# Patient Record
Sex: MALE | Race: WHITE | NOT HISPANIC OR LATINO | Employment: FULL TIME | URBAN - METROPOLITAN AREA
[De-identification: names, ages, dates, MRNs, and addresses within clinical notes are randomized per-mention and may not be internally consistent; named-entity substitution may affect disease eponyms.]

---

## 2019-10-16 PROBLEM — E78.5 HYPERLIPIDEMIA: Status: ACTIVE | Noted: 2019-10-16

## 2019-10-16 PROBLEM — I10 HYPERTENSION: Status: ACTIVE | Noted: 2019-10-16

## 2019-10-16 PROBLEM — I25.10 CORONARY ARTERY DISEASE: Status: ACTIVE | Noted: 2019-10-16

## 2019-10-21 RX ORDER — CARVEDILOL 25 MG/1
TABLET ORAL
Qty: 180 TABLET | Refills: 1 | Status: SHIPPED | OUTPATIENT
Start: 2019-10-21 | End: 2020-04-01

## 2020-01-20 RX ORDER — SACUBITRIL AND VALSARTAN 49; 51 MG/1; MG/1
TABLET, FILM COATED ORAL
Qty: 180 TABLET | Refills: 0 | Status: SHIPPED | OUTPATIENT
Start: 2020-01-20 | End: 2020-04-01

## 2020-01-20 RX ORDER — SIMVASTATIN 40 MG
TABLET ORAL
Qty: 90 TABLET | Refills: 0 | Status: SHIPPED | OUTPATIENT
Start: 2020-01-20 | End: 2020-04-01

## 2020-04-01 RX ORDER — SIMVASTATIN 40 MG
TABLET ORAL
Qty: 90 TABLET | Refills: 0 | Status: SHIPPED | OUTPATIENT
Start: 2020-04-01 | End: 2020-05-14

## 2020-04-01 RX ORDER — CARVEDILOL 25 MG/1
TABLET ORAL
Qty: 180 TABLET | Refills: 0 | Status: SHIPPED | OUTPATIENT
Start: 2020-04-01 | End: 2020-05-14

## 2020-04-01 RX ORDER — SACUBITRIL AND VALSARTAN 49; 51 MG/1; MG/1
TABLET, FILM COATED ORAL
Qty: 180 TABLET | Refills: 0 | Status: SHIPPED | OUTPATIENT
Start: 2020-04-01 | End: 2020-05-14

## 2020-05-14 RX ORDER — SACUBITRIL AND VALSARTAN 49; 51 MG/1; MG/1
TABLET, FILM COATED ORAL
Qty: 60 TABLET | Refills: 0 | Status: SHIPPED | OUTPATIENT
Start: 2020-05-14 | End: 2020-10-12

## 2020-05-14 RX ORDER — SIMVASTATIN 40 MG
TABLET ORAL
Qty: 30 TABLET | Refills: 0 | Status: SHIPPED | OUTPATIENT
Start: 2020-05-14 | End: 2020-10-12

## 2020-05-14 RX ORDER — CARVEDILOL 25 MG/1
TABLET ORAL
Qty: 60 TABLET | Refills: 0 | Status: SHIPPED | OUTPATIENT
Start: 2020-05-14 | End: 2020-10-12

## 2020-10-11 DIAGNOSIS — E78.5 HYPERLIPIDEMIA LDL GOAL <100: Primary | ICD-10-CM

## 2020-10-12 RX ORDER — SACUBITRIL AND VALSARTAN 49; 51 MG/1; MG/1
TABLET, FILM COATED ORAL
Qty: 60 TABLET | Refills: 0 | Status: SHIPPED | OUTPATIENT
Start: 2020-10-12 | End: 2020-12-14

## 2020-10-12 RX ORDER — SIMVASTATIN 40 MG
TABLET ORAL
Qty: 30 TABLET | Refills: 0 | Status: SHIPPED | OUTPATIENT
Start: 2020-10-12 | End: 2020-12-14

## 2020-10-12 RX ORDER — CARVEDILOL 25 MG/1
TABLET ORAL
Qty: 60 TABLET | Refills: 0 | Status: SHIPPED | OUTPATIENT
Start: 2020-10-12 | End: 2020-12-14

## 2020-10-27 ENCOUNTER — OFFICE VISIT (OUTPATIENT)
Dept: CARDIOLOGY | Facility: CLINIC | Age: 40
End: 2020-10-27

## 2020-10-27 VITALS
SYSTOLIC BLOOD PRESSURE: 155 MMHG | BODY MASS INDEX: 31.15 KG/M2 | DIASTOLIC BLOOD PRESSURE: 92 MMHG | WEIGHT: 230 LBS | OXYGEN SATURATION: 98 % | HEIGHT: 72 IN | HEART RATE: 75 BPM

## 2020-10-27 DIAGNOSIS — E11.9 TYPE 2 DIABETES MELLITUS WITHOUT COMPLICATION, WITHOUT LONG-TERM CURRENT USE OF INSULIN (HCC): ICD-10-CM

## 2020-10-27 DIAGNOSIS — I10 ESSENTIAL HYPERTENSION: ICD-10-CM

## 2020-10-27 DIAGNOSIS — I25.10 CORONARY ARTERY DISEASE INVOLVING NATIVE CORONARY ARTERY OF NATIVE HEART WITHOUT ANGINA PECTORIS: Primary | ICD-10-CM

## 2020-10-27 DIAGNOSIS — Z95.810 ICD (IMPLANTABLE CARDIOVERTER-DEFIBRILLATOR), DUAL, IN SITU: ICD-10-CM

## 2020-10-27 DIAGNOSIS — E78.00 PURE HYPERCHOLESTEROLEMIA: ICD-10-CM

## 2020-10-27 DIAGNOSIS — I25.5 ISCHEMIC CARDIOMYOPATHY: ICD-10-CM

## 2020-10-27 DIAGNOSIS — I50.22 CHRONIC SYSTOLIC CONGESTIVE HEART FAILURE (HCC): ICD-10-CM

## 2020-10-27 PROCEDURE — 99214 OFFICE O/P EST MOD 30 MIN: CPT | Performed by: INTERNAL MEDICINE

## 2020-10-27 RX ORDER — DAPAGLIFLOZIN 5 MG/1
5 TABLET, FILM COATED ORAL DAILY
COMMUNITY
End: 2021-08-09 | Stop reason: HOSPADM

## 2020-10-27 NOTE — PROGRESS NOTES
"    Subjective:     Encounter Date:10/27/2020      Patient ID: Yogi Tucker is a 40 y.o. male.    Chief Complaint:  History of Present Illness 40-year-old white male with history of coronary status post coronary artery bypass surgery history of congestive heart failure with ischemic cardiomyopathy status post ICD placement diabetes hypertension hyperlipidemia presents to my office for a follow-up.  Patient is currently stable without any symptoms of chest pain or shortness of breath at rest or exertion.  No complains of any PND or orthopnea.  No palpitation dizziness syncope or swelling of the feet.  Patient has been taking all the medicines regularly.  Patient does not smoke.  Patient is trying to exercise regular.  Patient follows a good diet.    The following portions of the patient's history were reviewed and updated as appropriate: allergies, current medications, past family history, past medical history, past social history, past surgical history and problem list.  Past Medical History:   Diagnosis Date   • Coronary artery disease    • Hyperlipidemia    • Hypertension      History reviewed. No pertinent surgical history.  /92 (BP Location: Left arm, Patient Position: Sitting)   Pulse 75   Ht 182.9 cm (72\")   Wt 104 kg (230 lb)   SpO2 98%   BMI 31.19 kg/m²   History reviewed. No pertinent family history.    Current Outpatient Medications:   •  aspirin 325 MG tablet, ASPIRIN 325 MG TABS, Disp: , Rfl:   •  carvedilol (COREG) 25 MG tablet, TAKE 1 TABLET TWICE A DAY  (MUST CALL MD OFFICE TO    MAKE AND KEEP APPOINTMENT  FOR FURTHER REFILLS), Disp: 60 tablet, Rfl: 0  •  dapagliflozin (Farxiga) 5 MG tablet tablet, Take 5 mg by mouth Daily., Disp: , Rfl:   •  Entresto 49-51 MG tablet, TAKE 1 TABLET TWICE A DAY  (MUST CALL MD OFFICE TO    MAKE AND KEEP APPOINTMENT  FOR FURTHER REFILLS), Disp: 60 tablet, Rfl: 0  •  Flaxseed, Linseed, (FLAXSEED OIL) 1000 MG capsule, FLAXSEED OIL 1000 MG CAPS, Disp: , Rfl: "   •  glimepiride (AMARYL) 2 MG tablet, GLIMEPIRIDE 2 MG TABS, Disp: , Rfl:   •  glucose blood (ONE TOUCH ULTRA TEST) test strip, ONETOUCH ULTRA BLUE STRP, Disp: , Rfl:   •  metFORMIN (GLUCOPHAGE) 1000 MG tablet, Take 1,000 mg by mouth 2 (Two) Times a Day., Disp: , Rfl:   •  simvastatin (ZOCOR) 40 MG tablet, TAKE 1 TABLET ONCE DAILY   (MUST CALL MD OFFICE TO    MAKE AND KEEP APPOINTMENT  FOR FURTHER REFILLS), Disp: 30 tablet, Rfl: 0  Allergies   Allergen Reactions   • Shellfish Allergy Unknown (See Comments)     Social History     Socioeconomic History   • Marital status:      Spouse name: Not on file   • Number of children: Not on file   • Years of education: Not on file   • Highest education level: Not on file   Tobacco Use   • Smoking status: Never Smoker   • Smokeless tobacco: Former User     Types: Chew     Review of Systems   Constitution: Negative for fever and malaise/fatigue.   HENT: Negative for ear pain and nosebleeds.    Eyes: Negative for blurred vision and double vision.   Cardiovascular: Negative for chest pain, dyspnea on exertion and palpitations.   Respiratory: Negative for cough and shortness of breath.    Skin: Negative for rash.   Musculoskeletal: Negative for joint pain.   Gastrointestinal: Negative for abdominal pain, nausea and vomiting.   Neurological: Negative for focal weakness and headaches.   Psychiatric/Behavioral: Negative for depression. The patient is not nervous/anxious.    All other systems reviewed and are negative.             Objective:     Constitutional:       Appearance: Well-developed.   Eyes:      General: No scleral icterus.     Conjunctiva/sclera: Conjunctivae normal.      Pupils: Pupils are equal, round, and reactive to light.   HENT:      Head: Normocephalic and atraumatic.   Neck:      Musculoskeletal: Normal range of motion and neck supple.      Vascular: No carotid bruit or JVD.   Pulmonary:      Effort: Pulmonary effort is normal.      Breath sounds: Normal  breath sounds. No wheezing. No rales.   Cardiovascular:      Normal rate. Regular rhythm.   Pulses:     Intact distal pulses.   Abdominal:      General: Bowel sounds are normal.      Palpations: Abdomen is soft.   Musculoskeletal: Normal range of motion.   Skin:     General: Skin is warm and dry.      Findings: No rash.   Neurological:      Mental Status: Alert.      Comments: No focal deficits       Procedures    Lab Review:       Assessment:          Diagnosis Plan   1. Coronary artery disease involving native coronary artery of native heart without angina pectoris     2. Chronic systolic congestive heart failure (CMS/HCC)     3. Ischemic cardiomyopathy     4. Essential hypertension     5. Type 2 diabetes mellitus without complication, without long-term current use of insulin (CMS/HCC)     6. Pure hypercholesterolemia     7. ICD (implantable cardioverter-defibrillator), dual, in situ            Plan:     -History of coronary artery status post coronary artery bypass surgery x3 vessels with a LIMA to LAD and saphenous graft to the marginal branch and RCA  Pain has history of congestive heart with LV systolic dysfunction and also history of ischemic cardiomyopathy status post ICD placement  Patient blood pressure and heart rate stable  Patient's lipid levels are followed by the primary care doctor  Patient has diabetes and is on oral medicines.

## 2020-11-06 ENCOUNTER — CLINICAL SUPPORT NO REQUIREMENTS (OUTPATIENT)
Dept: CARDIOLOGY | Facility: CLINIC | Age: 40
End: 2020-11-06

## 2020-11-06 DIAGNOSIS — Z95.810 PRESENCE OF AUTOMATIC IMPLANTABLE CARDIOVERTER-DEFIBRILLATOR: ICD-10-CM

## 2020-11-06 DIAGNOSIS — I50.9 HEART FAILURE, UNSPECIFIED HF CHRONICITY, UNSPECIFIED HEART FAILURE TYPE (HCC): ICD-10-CM

## 2020-11-06 DIAGNOSIS — I25.5 CARDIOMYOPATHY, ISCHEMIC: Primary | ICD-10-CM

## 2020-11-06 PROCEDURE — 93282 PRGRMG EVAL IMPLANTABLE DFB: CPT | Performed by: INTERNAL MEDICINE

## 2020-12-14 RX ORDER — SACUBITRIL AND VALSARTAN 49; 51 MG/1; MG/1
TABLET, FILM COATED ORAL
Qty: 180 TABLET | Refills: 2 | Status: SHIPPED | OUTPATIENT
Start: 2020-12-14 | End: 2021-08-09 | Stop reason: HOSPADM

## 2020-12-14 RX ORDER — CARVEDILOL 25 MG/1
TABLET ORAL
Qty: 180 TABLET | Refills: 2 | Status: SHIPPED | OUTPATIENT
Start: 2020-12-14 | End: 2021-08-09 | Stop reason: HOSPADM

## 2020-12-14 RX ORDER — SIMVASTATIN 40 MG
TABLET ORAL
Qty: 90 TABLET | Refills: 2 | Status: SHIPPED | OUTPATIENT
Start: 2020-12-14 | End: 2021-08-09 | Stop reason: HOSPADM

## 2021-05-07 ENCOUNTER — CLINICAL SUPPORT NO REQUIREMENTS (OUTPATIENT)
Dept: CARDIOLOGY | Facility: CLINIC | Age: 41
End: 2021-05-07

## 2021-05-07 DIAGNOSIS — I25.5 CARDIOMYOPATHY, ISCHEMIC: Primary | ICD-10-CM

## 2021-05-07 DIAGNOSIS — Z95.810 PRESENCE OF AUTOMATIC IMPLANTABLE CARDIOVERTER-DEFIBRILLATOR: ICD-10-CM

## 2021-05-07 PROCEDURE — 93282 PRGRMG EVAL IMPLANTABLE DFB: CPT | Performed by: INTERNAL MEDICINE

## 2021-08-03 ENCOUNTER — HOSPITAL ENCOUNTER (INPATIENT)
Facility: HOSPITAL | Age: 41
LOS: 6 days | Discharge: ANOTHER HOSPITAL | End: 2021-08-09
Attending: EMERGENCY MEDICINE | Admitting: HOSPITALIST

## 2021-08-03 ENCOUNTER — APPOINTMENT (OUTPATIENT)
Dept: GENERAL RADIOLOGY | Facility: HOSPITAL | Age: 41
End: 2021-08-03

## 2021-08-03 DIAGNOSIS — I21.4 NSTEMI (NON-ST ELEVATED MYOCARDIAL INFARCTION) (HCC): ICD-10-CM

## 2021-08-03 DIAGNOSIS — I50.43 ACUTE ON CHRONIC COMBINED SYSTOLIC AND DIASTOLIC CONGESTIVE HEART FAILURE (HCC): ICD-10-CM

## 2021-08-03 DIAGNOSIS — R77.8 ELEVATED TROPONIN: ICD-10-CM

## 2021-08-03 DIAGNOSIS — R57.0 CARDIOGENIC SHOCK (HCC): Primary | ICD-10-CM

## 2021-08-03 PROBLEM — N17.9 AKI (ACUTE KIDNEY INJURY) (HCC): Status: ACTIVE | Noted: 2021-08-03

## 2021-08-03 PROBLEM — IMO0002 HYPOGONADISM: Status: ACTIVE | Noted: 2017-02-06

## 2021-08-03 PROBLEM — E55.9 VITAMIN D DEFICIENCY: Status: ACTIVE | Noted: 2017-02-06

## 2021-08-03 PROBLEM — Z91.199 H/O NONCOMPLIANCE WITH MEDICAL TREATMENT, PRESENTING HAZARDS TO HEALTH: Status: ACTIVE | Noted: 2021-08-03

## 2021-08-03 PROBLEM — R65.20 SEVERE SEPSIS (HCC): Status: ACTIVE | Noted: 2021-08-03

## 2021-08-03 PROBLEM — E11.9 DIABETES MELLITUS (HCC): Status: ACTIVE | Noted: 2017-02-06

## 2021-08-03 PROBLEM — E78.5 HYPERLIPIDEMIA: Status: ACTIVE | Noted: 2017-02-06

## 2021-08-03 PROBLEM — R53.83 FATIGUE: Status: RESOLVED | Noted: 2017-02-06 | Resolved: 2021-08-03

## 2021-08-03 PROBLEM — E11.65 TYPE 2 DIABETES MELLITUS WITH HYPERGLYCEMIA (HCC): Status: ACTIVE | Noted: 2021-08-03

## 2021-08-03 PROBLEM — Z83.3 FAMILY HISTORY OF DIABETES MELLITUS: Status: ACTIVE | Noted: 2021-08-03

## 2021-08-03 PROBLEM — R53.83 FATIGUE: Status: ACTIVE | Noted: 2017-02-06

## 2021-08-03 PROBLEM — A41.9 SEVERE SEPSIS (HCC): Status: ACTIVE | Noted: 2021-08-03

## 2021-08-03 PROBLEM — I10 HYPERTENSIVE DISORDER: Status: ACTIVE | Noted: 2017-02-06

## 2021-08-03 LAB
ALBUMIN SERPL-MCNC: 3.8 G/DL (ref 3.5–5.2)
ALBUMIN/GLOB SERPL: 1.3 G/DL
ALP SERPL-CCNC: 95 U/L (ref 39–117)
ALT SERPL W P-5'-P-CCNC: 44 U/L (ref 1–41)
ANION GAP SERPL CALCULATED.3IONS-SCNC: 11 MMOL/L (ref 5–15)
APTT PPP: 28.8 SECONDS (ref 61–76.5)
AST SERPL-CCNC: 126 U/L (ref 1–40)
B PARAPERT DNA SPEC QL NAA+PROBE: NOT DETECTED
B PERT DNA SPEC QL NAA+PROBE: NOT DETECTED
BACTERIA UR QL AUTO: ABNORMAL /HPF
BASOPHILS # BLD AUTO: 0.1 10*3/MM3 (ref 0–0.2)
BASOPHILS # BLD AUTO: 0.1 10*3/MM3 (ref 0–0.2)
BASOPHILS NFR BLD AUTO: 0.5 % (ref 0–1.5)
BASOPHILS NFR BLD AUTO: 0.6 % (ref 0–1.5)
BILIRUB SERPL-MCNC: 0.6 MG/DL (ref 0–1.2)
BILIRUB UR QL STRIP: NEGATIVE
BUN SERPL-MCNC: 25 MG/DL (ref 6–20)
BUN/CREAT SERPL: 13.2 (ref 7–25)
C PNEUM DNA NPH QL NAA+NON-PROBE: NOT DETECTED
CALCIUM SPEC-SCNC: 8.6 MG/DL (ref 8.6–10.5)
CHLORIDE SERPL-SCNC: 98 MMOL/L (ref 98–107)
CLARITY UR: CLEAR
CO2 SERPL-SCNC: 26 MMOL/L (ref 22–29)
COLOR UR: YELLOW
CREAT SERPL-MCNC: 1.89 MG/DL (ref 0.76–1.27)
CRP SERPL-MCNC: 14.42 MG/DL (ref 0–0.5)
D-LACTATE SERPL-SCNC: 1.9 MMOL/L (ref 0.5–2)
DEPRECATED RDW RBC AUTO: 43.3 FL (ref 37–54)
DEPRECATED RDW RBC AUTO: 44.6 FL (ref 37–54)
EOSINOPHIL # BLD AUTO: 0 10*3/MM3 (ref 0–0.4)
EOSINOPHIL # BLD AUTO: 0 10*3/MM3 (ref 0–0.4)
EOSINOPHIL NFR BLD AUTO: 0.2 % (ref 0.3–6.2)
EOSINOPHIL NFR BLD AUTO: 0.2 % (ref 0.3–6.2)
ERYTHROCYTE [DISTWIDTH] IN BLOOD BY AUTOMATED COUNT: 13.9 % (ref 12.3–15.4)
ERYTHROCYTE [DISTWIDTH] IN BLOOD BY AUTOMATED COUNT: 14.3 % (ref 12.3–15.4)
FLUAV SUBTYP SPEC NAA+PROBE: NOT DETECTED
FLUBV RNA ISLT QL NAA+PROBE: NOT DETECTED
GFR SERPL CREATININE-BSD FRML MDRD: 40 ML/MIN/1.73
GLOBULIN UR ELPH-MCNC: 3 GM/DL
GLUCOSE BLDC GLUCOMTR-MCNC: 159 MG/DL (ref 70–105)
GLUCOSE SERPL-MCNC: 196 MG/DL (ref 65–99)
GLUCOSE UR STRIP-MCNC: ABNORMAL MG/DL
HADV DNA SPEC NAA+PROBE: NOT DETECTED
HCOV 229E RNA SPEC QL NAA+PROBE: NOT DETECTED
HCOV HKU1 RNA SPEC QL NAA+PROBE: NOT DETECTED
HCOV NL63 RNA SPEC QL NAA+PROBE: NOT DETECTED
HCOV OC43 RNA SPEC QL NAA+PROBE: NOT DETECTED
HCT VFR BLD AUTO: 41.7 % (ref 37.5–51)
HCT VFR BLD AUTO: 43.7 % (ref 37.5–51)
HGB BLD-MCNC: 13.8 G/DL (ref 13–17.7)
HGB BLD-MCNC: 14.3 G/DL (ref 13–17.7)
HGB UR QL STRIP.AUTO: NEGATIVE
HMPV RNA NPH QL NAA+NON-PROBE: NOT DETECTED
HOLD SPECIMEN: NORMAL
HPIV1 RNA SPEC QL NAA+PROBE: NOT DETECTED
HPIV2 RNA SPEC QL NAA+PROBE: NOT DETECTED
HPIV3 RNA NPH QL NAA+PROBE: NOT DETECTED
HPIV4 P GENE NPH QL NAA+PROBE: NOT DETECTED
HYALINE CASTS UR QL AUTO: ABNORMAL /LPF
INR PPP: 1.01 (ref 0.93–1.1)
KETONES UR QL STRIP: NEGATIVE
LEUKOCYTE ESTERASE UR QL STRIP.AUTO: NEGATIVE
LYMPHOCYTES # BLD AUTO: 1.5 10*3/MM3 (ref 0.7–3.1)
LYMPHOCYTES # BLD AUTO: 2 10*3/MM3 (ref 0.7–3.1)
LYMPHOCYTES NFR BLD AUTO: 10.5 % (ref 19.6–45.3)
LYMPHOCYTES NFR BLD AUTO: 13.3 % (ref 19.6–45.3)
M PNEUMO IGG SER IA-ACNC: NOT DETECTED
MAGNESIUM SERPL-MCNC: 2 MG/DL (ref 1.6–2.6)
MCH RBC QN AUTO: 29.4 PG (ref 26.6–33)
MCH RBC QN AUTO: 29.5 PG (ref 26.6–33)
MCHC RBC AUTO-ENTMCNC: 32.7 G/DL (ref 31.5–35.7)
MCHC RBC AUTO-ENTMCNC: 33 G/DL (ref 31.5–35.7)
MCV RBC AUTO: 89.2 FL (ref 79–97)
MCV RBC AUTO: 90 FL (ref 79–97)
MONOCYTES # BLD AUTO: 1.6 10*3/MM3 (ref 0.1–0.9)
MONOCYTES # BLD AUTO: 1.6 10*3/MM3 (ref 0.1–0.9)
MONOCYTES NFR BLD AUTO: 10.5 % (ref 5–12)
MONOCYTES NFR BLD AUTO: 11.2 % (ref 5–12)
MRSA DNA SPEC QL NAA+PROBE: NORMAL
NEUTROPHILS NFR BLD AUTO: 11.2 10*3/MM3 (ref 1.7–7)
NEUTROPHILS NFR BLD AUTO: 11.5 10*3/MM3 (ref 1.7–7)
NEUTROPHILS NFR BLD AUTO: 75.5 % (ref 42.7–76)
NEUTROPHILS NFR BLD AUTO: 77.5 % (ref 42.7–76)
NITRITE UR QL STRIP: NEGATIVE
NRBC BLD AUTO-RTO: 0 /100 WBC (ref 0–0.2)
NRBC BLD AUTO-RTO: 0 /100 WBC (ref 0–0.2)
NT-PROBNP SERPL-MCNC: ABNORMAL PG/ML (ref 0–450)
PH UR STRIP.AUTO: <=5 [PH] (ref 5–8)
PHOSPHATE SERPL-MCNC: 3.5 MG/DL (ref 2.5–4.5)
PLATELET # BLD AUTO: 181 10*3/MM3 (ref 140–450)
PLATELET # BLD AUTO: 211 10*3/MM3 (ref 140–450)
PMV BLD AUTO: 9.1 FL (ref 6–12)
PMV BLD AUTO: 9.3 FL (ref 6–12)
POTASSIUM SERPL-SCNC: 4.8 MMOL/L (ref 3.5–5.2)
PROCALCITONIN SERPL-MCNC: 0.36 NG/ML (ref 0–0.25)
PROT SERPL-MCNC: 6.8 G/DL (ref 6–8.5)
PROT UR QL STRIP: ABNORMAL
PROTHROMBIN TIME: 11.2 SECONDS (ref 9.6–11.7)
RBC # BLD AUTO: 4.68 10*6/MM3 (ref 4.14–5.8)
RBC # BLD AUTO: 4.85 10*6/MM3 (ref 4.14–5.8)
RBC # UR: ABNORMAL /HPF
REF LAB TEST METHOD: ABNORMAL
RHINOVIRUS RNA SPEC NAA+PROBE: NOT DETECTED
RSV RNA NPH QL NAA+NON-PROBE: NOT DETECTED
SARS-COV-2 RNA NPH QL NAA+NON-PROBE: NOT DETECTED
SARS-COV-2 RNA PNL SPEC NAA+PROBE: NOT DETECTED
SODIUM SERPL-SCNC: 135 MMOL/L (ref 136–145)
SP GR UR STRIP: 1.03 (ref 1–1.03)
SQUAMOUS #/AREA URNS HPF: ABNORMAL /HPF
TROPONIN T SERPL-MCNC: 3.19 NG/ML (ref 0–0.03)
TROPONIN T SERPL-MCNC: 3.42 NG/ML (ref 0–0.03)
UROBILINOGEN UR QL STRIP: ABNORMAL
WBC # BLD AUTO: 14.5 10*3/MM3 (ref 3.4–10.8)
WBC # BLD AUTO: 15.2 10*3/MM3 (ref 3.4–10.8)
WBC UR QL AUTO: ABNORMAL /HPF

## 2021-08-03 PROCEDURE — 99285 EMERGENCY DEPT VISIT HI MDM: CPT

## 2021-08-03 PROCEDURE — 87635 SARS-COV-2 COVID-19 AMP PRB: CPT | Performed by: EMERGENCY MEDICINE

## 2021-08-03 PROCEDURE — 87641 MR-STAPH DNA AMP PROBE: CPT | Performed by: NURSE PRACTITIONER

## 2021-08-03 PROCEDURE — 82962 GLUCOSE BLOOD TEST: CPT

## 2021-08-03 PROCEDURE — 83036 HEMOGLOBIN GLYCOSYLATED A1C: CPT | Performed by: NURSE PRACTITIONER

## 2021-08-03 PROCEDURE — 81001 URINALYSIS AUTO W/SCOPE: CPT | Performed by: EMERGENCY MEDICINE

## 2021-08-03 PROCEDURE — 84100 ASSAY OF PHOSPHORUS: CPT | Performed by: EMERGENCY MEDICINE

## 2021-08-03 PROCEDURE — 84484 ASSAY OF TROPONIN QUANT: CPT | Performed by: EMERGENCY MEDICINE

## 2021-08-03 PROCEDURE — 85730 THROMBOPLASTIN TIME PARTIAL: CPT | Performed by: EMERGENCY MEDICINE

## 2021-08-03 PROCEDURE — 63710000001 INSULIN LISPRO (HUMAN) PER 5 UNITS: Performed by: NURSE PRACTITIONER

## 2021-08-03 PROCEDURE — 93005 ELECTROCARDIOGRAM TRACING: CPT | Performed by: EMERGENCY MEDICINE

## 2021-08-03 PROCEDURE — 83880 ASSAY OF NATRIURETIC PEPTIDE: CPT | Performed by: NURSE PRACTITIONER

## 2021-08-03 PROCEDURE — 84145 PROCALCITONIN (PCT): CPT | Performed by: EMERGENCY MEDICINE

## 2021-08-03 PROCEDURE — 0202U NFCT DS 22 TRGT SARS-COV-2: CPT | Performed by: EMERGENCY MEDICINE

## 2021-08-03 PROCEDURE — 86140 C-REACTIVE PROTEIN: CPT | Performed by: NURSE PRACTITIONER

## 2021-08-03 PROCEDURE — 83735 ASSAY OF MAGNESIUM: CPT | Performed by: EMERGENCY MEDICINE

## 2021-08-03 PROCEDURE — 84484 ASSAY OF TROPONIN QUANT: CPT | Performed by: NURSE PRACTITIONER

## 2021-08-03 PROCEDURE — 25010000002 PIPERACILLIN SOD-TAZOBACTAM PER 1 G: Performed by: EMERGENCY MEDICINE

## 2021-08-03 PROCEDURE — 93005 ELECTROCARDIOGRAM TRACING: CPT

## 2021-08-03 PROCEDURE — 71045 X-RAY EXAM CHEST 1 VIEW: CPT

## 2021-08-03 PROCEDURE — 83605 ASSAY OF LACTIC ACID: CPT

## 2021-08-03 PROCEDURE — 85025 COMPLETE CBC W/AUTO DIFF WBC: CPT | Performed by: EMERGENCY MEDICINE

## 2021-08-03 PROCEDURE — 85610 PROTHROMBIN TIME: CPT | Performed by: EMERGENCY MEDICINE

## 2021-08-03 PROCEDURE — 87040 BLOOD CULTURE FOR BACTERIA: CPT | Performed by: EMERGENCY MEDICINE

## 2021-08-03 PROCEDURE — 25010000002 HEPARIN (PORCINE) 25000-0.45 UT/250ML-% SOLUTION: Performed by: EMERGENCY MEDICINE

## 2021-08-03 PROCEDURE — 25010000002 ONDANSETRON PER 1 MG

## 2021-08-03 PROCEDURE — 36415 COLL VENOUS BLD VENIPUNCTURE: CPT | Performed by: NURSE PRACTITIONER

## 2021-08-03 PROCEDURE — 80053 COMPREHEN METABOLIC PANEL: CPT | Performed by: EMERGENCY MEDICINE

## 2021-08-03 RX ORDER — SODIUM CHLORIDE 9 MG/ML
100 INJECTION, SOLUTION INTRAVENOUS CONTINUOUS
Status: DISCONTINUED | OUTPATIENT
Start: 2021-08-03 | End: 2021-08-05

## 2021-08-03 RX ORDER — ATORVASTATIN CALCIUM 40 MG/1
40 TABLET, FILM COATED ORAL NIGHTLY
Status: DISCONTINUED | OUTPATIENT
Start: 2021-08-03 | End: 2021-08-09 | Stop reason: HOSPADM

## 2021-08-03 RX ORDER — ONDANSETRON 2 MG/ML
4 INJECTION INTRAMUSCULAR; INTRAVENOUS EVERY 6 HOURS PRN
Status: DISCONTINUED | OUTPATIENT
Start: 2021-08-03 | End: 2021-08-09 | Stop reason: HOSPADM

## 2021-08-03 RX ORDER — NAPROXEN SODIUM 220 MG
220 TABLET ORAL AS NEEDED
COMMUNITY
End: 2021-08-09 | Stop reason: HOSPADM

## 2021-08-03 RX ORDER — NITROGLYCERIN 0.4 MG/1
0.4 TABLET SUBLINGUAL
Status: DISCONTINUED | OUTPATIENT
Start: 2021-08-03 | End: 2021-08-09 | Stop reason: HOSPADM

## 2021-08-03 RX ORDER — ACETAMINOPHEN 325 MG/1
650 TABLET ORAL AS NEEDED
COMMUNITY
End: 2021-08-09 | Stop reason: HOSPADM

## 2021-08-03 RX ORDER — ASPIRIN 325 MG
325 TABLET ORAL ONCE
Status: COMPLETED | OUTPATIENT
Start: 2021-08-03 | End: 2021-08-03

## 2021-08-03 RX ORDER — ACETAMINOPHEN 325 MG/1
650 TABLET ORAL EVERY 4 HOURS PRN
Status: DISCONTINUED | OUTPATIENT
Start: 2021-08-03 | End: 2021-08-09 | Stop reason: HOSPADM

## 2021-08-03 RX ORDER — ONDANSETRON 4 MG/1
4 TABLET, FILM COATED ORAL EVERY 6 HOURS PRN
Status: DISCONTINUED | OUTPATIENT
Start: 2021-08-03 | End: 2021-08-09 | Stop reason: HOSPADM

## 2021-08-03 RX ORDER — ACETAMINOPHEN 650 MG/1
650 SUPPOSITORY RECTAL EVERY 4 HOURS PRN
Status: DISCONTINUED | OUTPATIENT
Start: 2021-08-03 | End: 2021-08-09 | Stop reason: HOSPADM

## 2021-08-03 RX ORDER — ALUMINA, MAGNESIA, AND SIMETHICONE 2400; 2400; 240 MG/30ML; MG/30ML; MG/30ML
15 SUSPENSION ORAL EVERY 6 HOURS PRN
Status: DISCONTINUED | OUTPATIENT
Start: 2021-08-03 | End: 2021-08-09 | Stop reason: HOSPADM

## 2021-08-03 RX ORDER — HEPARIN SODIUM 10000 [USP'U]/100ML
9.26 INJECTION, SOLUTION INTRAVENOUS
Status: DISCONTINUED | OUTPATIENT
Start: 2021-08-03 | End: 2021-08-07

## 2021-08-03 RX ORDER — ONDANSETRON 2 MG/ML
INJECTION INTRAMUSCULAR; INTRAVENOUS
Status: COMPLETED
Start: 2021-08-03 | End: 2021-08-03

## 2021-08-03 RX ORDER — ASPIRIN 81 MG/1
81 TABLET ORAL DAILY
Status: DISCONTINUED | OUTPATIENT
Start: 2021-08-04 | End: 2021-08-09 | Stop reason: HOSPADM

## 2021-08-03 RX ORDER — NICOTINE POLACRILEX 4 MG
15 LOZENGE BUCCAL
Status: DISCONTINUED | OUTPATIENT
Start: 2021-08-03 | End: 2021-08-09 | Stop reason: HOSPADM

## 2021-08-03 RX ORDER — DEXTROSE MONOHYDRATE 25 G/50ML
25 INJECTION, SOLUTION INTRAVENOUS
Status: DISCONTINUED | OUTPATIENT
Start: 2021-08-03 | End: 2021-08-09 | Stop reason: HOSPADM

## 2021-08-03 RX ORDER — INSULIN LISPRO 100 [IU]/ML
0-14 INJECTION, SOLUTION INTRAVENOUS; SUBCUTANEOUS AS NEEDED
Status: DISCONTINUED | OUTPATIENT
Start: 2021-08-03 | End: 2021-08-07

## 2021-08-03 RX ORDER — ONDANSETRON 2 MG/ML
8 INJECTION INTRAMUSCULAR; INTRAVENOUS ONCE
Status: COMPLETED | OUTPATIENT
Start: 2021-08-03 | End: 2021-08-03

## 2021-08-03 RX ORDER — INSULIN LISPRO 100 [IU]/ML
0-14 INJECTION, SOLUTION INTRAVENOUS; SUBCUTANEOUS EVERY 6 HOURS SCHEDULED
Status: DISCONTINUED | OUTPATIENT
Start: 2021-08-03 | End: 2021-08-07

## 2021-08-03 RX ADMIN — ASPIRIN 325 MG ORAL TABLET 325 MG: 325 PILL ORAL at 15:31

## 2021-08-03 RX ADMIN — SODIUM CHLORIDE, POTASSIUM CHLORIDE, SODIUM LACTATE AND CALCIUM CHLORIDE 2000 ML: 600; 310; 30; 20 INJECTION, SOLUTION INTRAVENOUS at 14:46

## 2021-08-03 RX ADMIN — PIPERACILLIN AND TAZOBACTAM 3.38 G: 3; .375 INJECTION, POWDER, LYOPHILIZED, FOR SOLUTION INTRAVENOUS at 15:04

## 2021-08-03 RX ADMIN — SODIUM CHLORIDE 50 ML/HR: 9 INJECTION, SOLUTION INTRAVENOUS at 20:38

## 2021-08-03 RX ADMIN — ONDANSETRON 8 MG: 2 INJECTION INTRAMUSCULAR; INTRAVENOUS at 18:13

## 2021-08-03 RX ADMIN — INSULIN LISPRO 3 UNITS: 100 INJECTION, SOLUTION INTRAVENOUS; SUBCUTANEOUS at 20:01

## 2021-08-03 RX ADMIN — HEPARIN SODIUM 9.26 UNITS/KG/HR: 10000 INJECTION, SOLUTION INTRAVENOUS at 19:16

## 2021-08-03 RX ADMIN — ATORVASTATIN CALCIUM 40 MG: 40 TABLET, FILM COATED ORAL at 20:01

## 2021-08-03 NOTE — ATTESTATION SEPSIS FOCUSED EXAM
SEPTIC SHOCK FOCUSED EXAM ATTESTATION    I attest that I have reassessed tissue perfusion after the fluid bolus given.  Patient does not appear to be in septic shock, but it felt likely to be in cardiogenic shock.  Sepsis cannot be ruled out.      Micha Ferreira, WHIT  08/03/21  19:17 EDT

## 2021-08-03 NOTE — ED PROVIDER NOTES
Subjective   40-year-old male had the onset of chest tightness and shortness of breath starting yesterday. He states she had some muscle aches and some feeling that his legs were little swollen. He states he had an extensive nonproductive cough. He states he laid he got up and was moving around last night he had a syncopal episode because he was so short of breath. He states he finished the Covid vaccination process in April. He reports that he has not had much chest pain and just feels a little tight occasionally with exertion. He reports he has not recently checked his blood sugar. He reports that he has no association of nausea with the episodes of shortness of breath. He states he was diaphoretic last night. He states he has had some palpitations          Review of Systems   Constitutional: Positive for fatigue.   HENT: Negative for trouble swallowing.    Respiratory: Positive for cough, chest tightness and shortness of breath.    Cardiovascular: Positive for palpitations.   Musculoskeletal: Negative for neck pain and neck stiffness.   Hematological: Does not bruise/bleed easily.   All other systems reviewed and are negative.      Past Medical History:   Diagnosis Date   • Coronary artery disease    • Hyperlipidemia    • Hypertension    3 of coronary artery bypass grafting and previous AICD placement, history of congestive heart failure, history of type 1 diabetes    Allergies   Allergen Reactions   • Shellfish Allergy Unknown (See Comments)       No past surgical history on file.    No family history on file.    Social History     Socioeconomic History   • Marital status:      Spouse name: Not on file   • Number of children: Not on file   • Years of education: Not on file   • Highest education level: Not on file   Tobacco Use   • Smoking status: Never Smoker   • Smokeless tobacco: Former User     Types: Chew           Objective   Physical Exam  Alert Ronnie Coma Scale 15   HEENT: Pupils equal and  reactive to light. Conjunctivae are not injected. normal tympanic membranes. Oropharynx and nares are normal.   Neck: Supple. Midline trachea. No JVD. No goiter.   Chest: Bibasilar rales are identified and equal breath sounds bilaterally regular rate and rhythm without murmur or rub. There is an S3   Abdomen: Positive bowel sounds nontender nondistended. No rebound or peritoneal signs. No CVA tenderness.   Extremities no clubbing cyanosis or edema motor sensory exam is normal the full range of motion is intact   skin: Warm and dry, no rashes or petechia.   Lymphatic: No regional lymphadenopathy. No calf pain, swelling or Andre's sign    Procedures           ED Course  ED Course as of Aug 03 1831   Tue Aug 03, 2021   1410 20-minute delay in getting to the treatment area    [TH]      ED Course User Index  [TH] Shan Sheriff MD   The patient was given a 30 cc/kg bolus and also received IV Zosyn due to a marginal lactic acid and possibility of sepsis.    Labs Reviewed   COMPREHENSIVE METABOLIC PANEL - Abnormal; Notable for the following components:       Result Value    Glucose 196 (*)     BUN 25 (*)     Creatinine 1.89 (*)     Sodium 135 (*)     ALT (SGPT) 44 (*)     AST (SGOT) 126 (*)     eGFR Non  Amer 40 (*)     All other components within normal limits    Narrative:     GFR Normal >60  Chronic Kidney Disease <60  Kidney Failure <15     URINALYSIS W/ CULTURE IF INDICATED - Abnormal; Notable for the following components:    Glucose, UA >=1000 mg/dL (3+) (*)     Protein, UA 30 mg/dL (1+) (*)     All other components within normal limits   TROPONIN (IN-HOUSE) - Abnormal; Notable for the following components:    Troponin T 3.420 (*)     All other components within normal limits    Narrative:     Troponin T Reference Range:  <= 0.03 ng/mL-   Negative for AMI  >0.03 ng/mL-     Abnormal for myocardial necrosis.  Clinicians would have to utilize clinical acumen, EKG, Troponin and serial changes to determine if it  "is an Acute Myocardial Infarction or myocardial injury due to an underlying chronic condition.       Results may be falsely decreased if patient taking Biotin.     PROCALCITONIN - Abnormal; Notable for the following components:    Procalcitonin 0.36 (*)     All other components within normal limits    Narrative:     As a Marker for Sepsis (Non-Neonates):     1. <0.5 ng/mL represents a low risk of severe sepsis and/or septic shock.  2. >2 ng/mL represents a high risk of severe sepsis and/or septic shock.    As a Marker for Lower Respiratory Tract Infections that require antibiotic therapy:  PCT on Admission     Antibiotic Therapy             6-12 Hrs later  >0.5                          Strongly Recommended            >0.25 - <0.5             Recommended  0.1 - 0.25                  Discouraged                       Remeasure/reassess PCT  <0.1                         Strongly Discouraged         Remeasure/reassess PCT      As 28 day mortality risk marker: \"Change in Procalcitonin Result\" (>80% or <=80%) if Day 0 (or Day 1) and Day 4 values are available. Refer to http://www.[facility]pct-calculator.com/    Change in PCT <=80 %   A decrease of PCT levels below or equal to 80% defines a positive change in PCT test result representing a higher risk for 28-day all-cause mortality of patients diagnosed with severe sepsis or septic shock.    Change in PCT >80 %   A decrease of PCT levels of more than 80% defines a negative change in PCT result representing a lower risk for 28-day all-cause mortality of patients diagnosed with severe sepsis or septic shock.              Results may be falsely decreased if patient taking Biotin.    CBC WITH AUTO DIFFERENTIAL - Abnormal; Notable for the following components:    WBC 15.20 (*)     Lymphocyte % 13.3 (*)     Eosinophil % 0.2 (*)     Neutrophils, Absolute 11.50 (*)     Monocytes, Absolute 1.60 (*)     All other components within normal limits   URINALYSIS, MICROSCOPIC ONLY - " Abnormal; Notable for the following components:    RBC, UA 0-2 (*)     WBC, UA 0-2 (*)     All other components within normal limits   COVID-19,CEPHEID,COR/INDERJIT/PAD/CLEMENCIA IN-HOUSE,NP SWAB IN TRANSPORT MEDIA 3-4 HR TAT, RT-PCR - Normal    Narrative:     Fact sheet for providers: https://www.fda.gov/media/894004/download     Fact sheet for patients: https://www.fda.gov/media/279231/download  Fact sheet for providers: https://www.fda.gov/media/058720/download    Fact sheet for patients: https://www.fda.gov/media/211844/download    Test performed by PCR.   RESPIRATORY PANEL PCR W/ COVID-19 (SARS-COV-2) FARIDA/NEELAM/INDERJIT/PAD/COR/MAD/DAVONTE IN-HOUSE, NP SWAB IN UTM/VTP, 3-4 HR TAT - Normal    Narrative:     In the setting of a positive respiratory panel with a viral infection PLUS a negative procalcitonin without other underlying concern for bacterial infection, consider observing off antibiotics or discontinuation of antibiotics and continue supportive care. If the respiratory panel is positive for atypical bacterial infection (Bordetella pertussis, Chlamydophila pneumoniae, or Mycoplasma pneumoniae), consider antibiotic de-escalation to target atypical bacterial infection.   MAGNESIUM - Normal   PHOSPHORUS - Normal   POC LACTATE - Normal   BLOOD CULTURE   BLOOD CULTURE   PROTIME-INR   APTT   CBC WITH AUTO DIFFERENTIAL   CBC AND DIFFERENTIAL    Narrative:     The following orders were created for panel order CBC & Differential.  Procedure                               Abnormality         Status                     ---------                               -----------         ------                     CBC Auto Differential[[id number]]        Abnormal            Final result                 Please view results for these tests on the individual orders.   EXTRA TUBES    Narrative:     The following orders were created for panel order Extra Tubes.  Procedure                               Abnormality         Status                      ---------                               -----------         ------                     Gold Top - [facility][[id number]]                                   Final result                 Please view results for these tests on the individual orders.   GOLD TOP - SST   CBC AND DIFFERENTIAL    Narrative:     The following orders were created for panel order CBC & Differential.  Procedure                               Abnormality         Status                     ---------                               -----------         ------                     CBC Auto Differential[[id number]]                                                         Please view results for these tests on the individual orders.     Medications   heparin bolus from bag 5,000 Units (has no administration in time range)   heparin 81717 units/250 mL (100 units/mL) in 0.45 % NaCl infusion (has no administration in time range)   heparin bolus from bag 2,500 Units (has no administration in time range)   heparin bolus from bag 5,000 Units (has no administration in time range)   lactated ringers - IBW for BMI > 30 bolus 2,328 mL (2,000 mL Intravenous New Bag 8/3/21 1446)   piperacillin-tazobactam (ZOSYN) IVPB 3.375 g in 100 mL NS (CD) (0 g Intravenous Stopped 8/3/21 1523)   aspirin tablet 325 mg (325 mg Oral Given 8/3/21 1531)   ondansetron (ZOFRAN) injection 8 mg (8 mg Intravenous Given 8/3/21 1813)     XR Chest 1 View    Result Date: 8/3/2021  No active disease.  Electronically Signed By-Antony Muro MD On:8/3/2021 2:43 PM This report was finalized on [id number] by  Antony Muro MD.                                           MDM  Number of Diagnoses or Management Options     Amount and/or Complexity of Data Reviewed  Clinical lab tests: reviewed  Tests in the radiology section of CPT®: reviewed  Tests in the medicine section of CPT®: reviewed    Risk of Complications, Morbidity, and/or Mortality  Presenting problems: high  Diagnostic procedures: high  Management  options: high  General comments: The patient's blood pressure improved with ER therapy. This patient stated he felt better. He denied chest pain at any point. The case was discussed in the intensivist practitioner and also with Dr. Hedrick. The patient was continued on dopamine and ACS dosing heparin. The patient admitted to ICU and will most likely require catheterization. The patient was to be seen emergently by cardiology. He was agreeable to this plan of treatment        Final diagnoses:   Cardiogenic shock (CMS/HCC)   Acute on chronic combined systolic and diastolic congestive heart failure (CMS/HCC)   Elevated troponin       ED Disposition  ED Disposition     ED Disposition Condition Comment    Decision to Admit  Level of Care: Critical Care [6]   Diagnosis: Cardiogenic shock (CMS/HCC) [785.51.ICD-9-CM]   Admitting Physician: KAMERON ELIZABETH [[id number]]   Attending Physician: KAMERON ELIZABETH [[id number]]   Isolate for COVID?: No [0]   Certification: I Certify That Inpatient Hospital Services Are Medically Necessary For Greater Than 2 Midnights            No follow-up provider specified.       Medication List      No changes were made to your prescriptions during this visit.          Shan Sheriff MD  08/03/21 8582

## 2021-08-03 NOTE — ED NOTES
C/o chills, sweats, body aches, non productive cough since last night, Pt reports he had a syncopal episode last night at home. Hx of DM, and Cardiac bypass     Kate Guerrero RN  08/03/21 2545

## 2021-08-04 ENCOUNTER — APPOINTMENT (OUTPATIENT)
Dept: GENERAL RADIOLOGY | Facility: HOSPITAL | Age: 41
End: 2021-08-04

## 2021-08-04 ENCOUNTER — APPOINTMENT (OUTPATIENT)
Dept: CARDIOLOGY | Facility: HOSPITAL | Age: 41
End: 2021-08-04

## 2021-08-04 LAB
ALBUMIN SERPL-MCNC: 3.6 G/DL (ref 3.5–5.2)
ALBUMIN/GLOB SERPL: 1.2 G/DL
ALP SERPL-CCNC: 96 U/L (ref 39–117)
ALT SERPL W P-5'-P-CCNC: 46 U/L (ref 1–41)
ANION GAP SERPL CALCULATED.3IONS-SCNC: 11 MMOL/L (ref 5–15)
ANION GAP SERPL CALCULATED.3IONS-SCNC: 13 MMOL/L (ref 5–15)
APTT PPP: 36.5 SECONDS (ref 61–76.5)
APTT PPP: 40.8 SECONDS (ref 61–76.5)
APTT PPP: 45.5 SECONDS (ref 61–76.5)
AST SERPL-CCNC: 110 U/L (ref 1–40)
BASOPHILS # BLD AUTO: 0 10*3/MM3 (ref 0–0.2)
BASOPHILS NFR BLD AUTO: 0.3 % (ref 0–1.5)
BILIRUB SERPL-MCNC: 0.7 MG/DL (ref 0–1.2)
BUN SERPL-MCNC: 29 MG/DL (ref 6–20)
BUN SERPL-MCNC: 31 MG/DL (ref 6–20)
BUN/CREAT SERPL: 16.9 (ref 7–25)
BUN/CREAT SERPL: 17.3 (ref 7–25)
CALCIUM SPEC-SCNC: 8.2 MG/DL (ref 8.6–10.5)
CALCIUM SPEC-SCNC: 8.6 MG/DL (ref 8.6–10.5)
CHLORIDE SERPL-SCNC: 101 MMOL/L (ref 98–107)
CHLORIDE SERPL-SCNC: 101 MMOL/L (ref 98–107)
CHOLEST SERPL-MCNC: 130 MG/DL (ref 0–200)
CO2 SERPL-SCNC: 23 MMOL/L (ref 22–29)
CO2 SERPL-SCNC: 25 MMOL/L (ref 22–29)
CREAT SERPL-MCNC: 1.72 MG/DL (ref 0.76–1.27)
CREAT SERPL-MCNC: 1.79 MG/DL (ref 0.76–1.27)
DEPRECATED RDW RBC AUTO: 44.6 FL (ref 37–54)
EOSINOPHIL # BLD AUTO: 0 10*3/MM3 (ref 0–0.4)
EOSINOPHIL NFR BLD AUTO: 0.2 % (ref 0.3–6.2)
ERYTHROCYTE [DISTWIDTH] IN BLOOD BY AUTOMATED COUNT: 14.2 % (ref 12.3–15.4)
GFR SERPL CREATININE-BSD FRML MDRD: 42 ML/MIN/1.73
GFR SERPL CREATININE-BSD FRML MDRD: 44 ML/MIN/1.73
GLOBULIN UR ELPH-MCNC: 2.9 GM/DL
GLUCOSE BLDC GLUCOMTR-MCNC: 134 MG/DL (ref 70–105)
GLUCOSE BLDC GLUCOMTR-MCNC: 142 MG/DL (ref 70–105)
GLUCOSE BLDC GLUCOMTR-MCNC: 159 MG/DL (ref 70–105)
GLUCOSE BLDC GLUCOMTR-MCNC: 163 MG/DL (ref 70–105)
GLUCOSE BLDC GLUCOMTR-MCNC: 174 MG/DL (ref 70–105)
GLUCOSE SERPL-MCNC: 137 MG/DL (ref 65–99)
GLUCOSE SERPL-MCNC: 152 MG/DL (ref 65–99)
HBA1C MFR BLD: 7.5 % (ref 3.5–5.6)
HCT VFR BLD AUTO: 40 % (ref 37.5–51)
HDLC SERPL-MCNC: 51 MG/DL (ref 40–60)
HGB BLD-MCNC: 13.2 G/DL (ref 13–17.7)
LDLC SERPL CALC-MCNC: 56 MG/DL (ref 0–100)
LDLC/HDLC SERPL: 1.03 {RATIO}
LYMPHOCYTES # BLD AUTO: 1.7 10*3/MM3 (ref 0.7–3.1)
LYMPHOCYTES NFR BLD AUTO: 14.9 % (ref 19.6–45.3)
MAGNESIUM SERPL-MCNC: 2 MG/DL (ref 1.6–2.6)
MCH RBC QN AUTO: 29.8 PG (ref 26.6–33)
MCHC RBC AUTO-ENTMCNC: 32.9 G/DL (ref 31.5–35.7)
MCV RBC AUTO: 90.6 FL (ref 79–97)
MONOCYTES # BLD AUTO: 1.2 10*3/MM3 (ref 0.1–0.9)
MONOCYTES NFR BLD AUTO: 10.4 % (ref 5–12)
NEUTROPHILS NFR BLD AUTO: 74.2 % (ref 42.7–76)
NEUTROPHILS NFR BLD AUTO: 8.6 10*3/MM3 (ref 1.7–7)
NRBC BLD AUTO-RTO: 0.1 /100 WBC (ref 0–0.2)
PHOSPHATE SERPL-MCNC: 2.8 MG/DL (ref 2.5–4.5)
PLATELET # BLD AUTO: 154 10*3/MM3 (ref 140–450)
PMV BLD AUTO: 9.6 FL (ref 6–12)
POTASSIUM SERPL-SCNC: 4.4 MMOL/L (ref 3.5–5.2)
POTASSIUM SERPL-SCNC: 5 MMOL/L (ref 3.5–5.2)
PROT SERPL-MCNC: 6.5 G/DL (ref 6–8.5)
QT INTERVAL: 321 MS
RBC # BLD AUTO: 4.41 10*6/MM3 (ref 4.14–5.8)
SODIUM SERPL-SCNC: 137 MMOL/L (ref 136–145)
SODIUM SERPL-SCNC: 137 MMOL/L (ref 136–145)
TRIGL SERPL-MCNC: 133 MG/DL (ref 0–150)
TROPONIN T SERPL-MCNC: 3.4 NG/ML (ref 0–0.03)
TROPONIN T SERPL-MCNC: 3.57 NG/ML (ref 0–0.03)
TROPONIN T SERPL-MCNC: 3.91 NG/ML (ref 0–0.03)
TROPONIN T SERPL-MCNC: 4.17 NG/ML (ref 0–0.03)
VLDLC SERPL-MCNC: 23 MG/DL (ref 5–40)
WBC # BLD AUTO: 11.6 10*3/MM3 (ref 3.4–10.8)

## 2021-08-04 PROCEDURE — 25010000002 HEPARIN (PORCINE) 25000-0.45 UT/250ML-% SOLUTION: Performed by: EMERGENCY MEDICINE

## 2021-08-04 PROCEDURE — 36415 COLL VENOUS BLD VENIPUNCTURE: CPT | Performed by: NURSE PRACTITIONER

## 2021-08-04 PROCEDURE — 25010000002 SULFUR HEXAFLUORIDE MICROSPH 60.7-25 MG RECONSTITUTED SUSPENSION: Performed by: INTERNAL MEDICINE

## 2021-08-04 PROCEDURE — 63710000001 INSULIN LISPRO (HUMAN) PER 5 UNITS: Performed by: NURSE PRACTITIONER

## 2021-08-04 PROCEDURE — 84484 ASSAY OF TROPONIN QUANT: CPT | Performed by: NURSE PRACTITIONER

## 2021-08-04 PROCEDURE — 80053 COMPREHEN METABOLIC PANEL: CPT | Performed by: NURSE PRACTITIONER

## 2021-08-04 PROCEDURE — 85025 COMPLETE CBC W/AUTO DIFF WBC: CPT | Performed by: NURSE PRACTITIONER

## 2021-08-04 PROCEDURE — 25010000002 PIPERACILLIN SOD-TAZOBACTAM PER 1 G: Performed by: NURSE PRACTITIONER

## 2021-08-04 PROCEDURE — 93010 ELECTROCARDIOGRAM REPORT: CPT | Performed by: INTERNAL MEDICINE

## 2021-08-04 PROCEDURE — 25010000002 ONDANSETRON PER 1 MG: Performed by: NURSE PRACTITIONER

## 2021-08-04 PROCEDURE — 80048 BASIC METABOLIC PNL TOTAL CA: CPT | Performed by: INTERNAL MEDICINE

## 2021-08-04 PROCEDURE — 93306 TTE W/DOPPLER COMPLETE: CPT

## 2021-08-04 PROCEDURE — 93005 ELECTROCARDIOGRAM TRACING: CPT | Performed by: NURSE PRACTITIONER

## 2021-08-04 PROCEDURE — 80061 LIPID PANEL: CPT | Performed by: NURSE PRACTITIONER

## 2021-08-04 PROCEDURE — 82962 GLUCOSE BLOOD TEST: CPT

## 2021-08-04 PROCEDURE — 71045 X-RAY EXAM CHEST 1 VIEW: CPT

## 2021-08-04 PROCEDURE — 85730 THROMBOPLASTIN TIME PARTIAL: CPT | Performed by: INTERNAL MEDICINE

## 2021-08-04 PROCEDURE — 84100 ASSAY OF PHOSPHORUS: CPT | Performed by: NURSE PRACTITIONER

## 2021-08-04 PROCEDURE — 83735 ASSAY OF MAGNESIUM: CPT | Performed by: NURSE PRACTITIONER

## 2021-08-04 PROCEDURE — 93306 TTE W/DOPPLER COMPLETE: CPT | Performed by: INTERNAL MEDICINE

## 2021-08-04 PROCEDURE — 85730 THROMBOPLASTIN TIME PARTIAL: CPT | Performed by: NURSE PRACTITIONER

## 2021-08-04 PROCEDURE — 99223 1ST HOSP IP/OBS HIGH 75: CPT | Performed by: INTERNAL MEDICINE

## 2021-08-04 RX ADMIN — PIPERACILLIN AND TAZOBACTAM 3.38 G: 3; .375 INJECTION, POWDER, LYOPHILIZED, FOR SOLUTION INTRAVENOUS at 09:51

## 2021-08-04 RX ADMIN — INSULIN LISPRO 3 UNITS: 100 INJECTION, SOLUTION INTRAVENOUS; SUBCUTANEOUS at 18:08

## 2021-08-04 RX ADMIN — PIPERACILLIN AND TAZOBACTAM 3.38 G: 3; .375 INJECTION, POWDER, LYOPHILIZED, FOR SOLUTION INTRAVENOUS at 00:26

## 2021-08-04 RX ADMIN — HEPARIN SODIUM 12.26 UNITS/KG/HR: 10000 INJECTION, SOLUTION INTRAVENOUS at 09:45

## 2021-08-04 RX ADMIN — ASPIRIN 81 MG: 81 TABLET, COATED ORAL at 09:38

## 2021-08-04 RX ADMIN — Medication 1200 MG: at 22:11

## 2021-08-04 RX ADMIN — ATORVASTATIN CALCIUM 40 MG: 40 TABLET, FILM COATED ORAL at 22:11

## 2021-08-04 RX ADMIN — SULFUR HEXAFLUORIDE 2 ML: KIT at 16:49

## 2021-08-04 RX ADMIN — ONDANSETRON 4 MG: 2 INJECTION INTRAMUSCULAR; INTRAVENOUS at 22:31

## 2021-08-04 RX ADMIN — INSULIN LISPRO 3 UNITS: 100 INJECTION, SOLUTION INTRAVENOUS; SUBCUTANEOUS at 03:12

## 2021-08-04 NOTE — CASE MANAGEMENT/SOCIAL WORK
Discharge Planning Assessment   [place]     Patient Name: Yogi Tucker  MRN: [id number]  Today's Date: 8/4/2021    Admit Date: 8/3/2021    Discharge Needs Assessment     Row Name 08/04/21 1141       Living Environment    Lives With  spouse    Current Living Arrangements  home/apartment/condo    Quality of Family Relationships  supportive    Able to Return to Prior Arrangements  yes       Resource/Environmental Concerns    Transportation Concerns  car, none       Transition Planning    Patient/Family Anticipates Transition to  home with family    Transportation Anticipated  car, drives self;family or friend will provide       Discharge Needs Assessment    Readmission Within the Last 30 Days  no previous admission in last 30 days    Equipment Currently Used at Home  none        Discharge Plan     Row Name 08/04/21 1142       Plan    Plan  D/C Plan : Anticipate routine to home .    Plan Comments  Trop. of 3.42 and is on a Hep gtt and plans for a cath        Continued Care and Services - Admitted Since 8/3/2021    Coordination has not been started for this encounter.         Demographic Summary     Row Name 08/04/21 1141       General Information    Admission Type  inpatient    Arrived From  emergency department    Preferred Language  English     Used During This Interaction  no        Functional Status     Row Name 08/04/21 1141       Functional Status    Usual Activity Tolerance  good    Current Activity Tolerance  good       Mental Status    General Appearance WDL  WDL       Mental Status Summary    Recent Changes in Mental Status/Cognitive Functioning  no changes                Cheri Wilkinson RN

## 2021-08-04 NOTE — H&P
PULMONARY/CRITICAL CARE HISTORY & PHYSICAL       PATIENT NAME:     Yogi Tucker  :     1980    MRN:     [id number]       ROOM:     [facility] ED      PRIMARY CARE PHYSICIAN:  Mey Cox    SUBJECTIVE     CHIEF COMPLAINT:   Shortness of breath with chest tightness    HISTORY OF PRESENT ILLNESS:  Yogi Tucker is a 40 y.o. male  has a past medical history of Coronary artery disease, Hyperlipidemia, and Hypertension.   Patient presented to [facility] on 8/3/2021 with complaint of shortness of breath and tightness of chest.  Patient stated that he has had some muscle aching and feeling like his legs were slightly swollen.  Patient has had extensive but none productive cough.  Patient was swabbed for COVID-19 by his PCP which was negative.  Patient has been vaccinated against COVID-19.  Patient also states that last night he was moving around and had a syncopal episode where he stated he fell to the floor, passed out and woke up sometime later.  Patient reports he has not had much chest pain however just feels slightly tight with breathing and exertion.  Patient admits to not checking his blood sugar regularly.  Patient denies nausea, vomiting, or diarrhea.  Patient does endorse diaphoresis last night with some palpitations.  Of note patient has history of CABG x3 3/4/2012, in which he had similar symptoms.  Patient also has AICD placement 3/12/2012.  Patient is seen by Dr. Appiah, who was on call for cardiology this evening.  Called by ED physician for elevated troponin.  Patient was placed on heparin gtt. and will be seen in the morning for a possible cardiac catheterization.    In the ED, labs were obtained with abnormalities as follows: Troponin 3.420, proBNP 13,350, glucose 196, sodium 135, creatinine 1.89, BUN 25, ALT 44, , CRP 14.42, procalcitonin 0.36, PTT 28.8, WBC 15.20.  UA: > 1000 glucose, + protein.      Pulmonary/Intensivist service was contacted for admission to ICU and  further evaluation and treatment.      REVIEW OF SYSTEMS:  Pertinent items are noted in HPI, all other systems reviewed and negative      ASSESSMENT & PLAN     Principal Problem:    NSTEMI (non-ST elevated myocardial infarction) (CMS/[facility])  Active Problems:    Cardiomyopathy, ischemic    Coronary artery disease    Heart failure (CMS/[facility])    Hyperlipidemia    Hypertensive disorder    Presence of automatic implantable cardioverter-defibrillator    Cardiogenic shock (CMS/[facility])    Severe sepsis, not felt to be septic shock    Diabetes mellitus (CMS/[facility])    SATHISH (acute kidney injury) (CMS/[facility])    H/O noncompliance with medical treatment, presenting hazards to health     PLAN:    Rule out NSTEMI  Elevated troponin  History of coronary artery disease  -Cardiology consulted  -Possible cardiac catheterization in a.m.  -Heparin gtt started  -Troponin 3.420  -Trend until normalized  -EKG reviewed    Severe sepsis, not felt to be septic shock  Rule out cardiogenic shock  -IVF bolus 30 mL/kg per sepsis protocol in ED  -Continue IVFs  -At risk of needing vasopressor support  -Lactic Acid 1.9  -Cultures-pending  -UA-negative for UTI  -Procalcitonin-0.36, CRP-14.42  -Continue Zosyn; deescalate pending culture results    Ischemic cardiomyopathy  Heart failure with reduced left ventricular function  -Last EF 35% on ECHO 1/30/2018  -proBNP 13,350  -ECHO pending    Type 2 diabetes mellitus, not controlled  -Patient noncompliant with medical management  -A1c pending  -Hold home Metformin and glimepiride while in ICU  -Sliding scale insulin for coverage    Acute kidney injury, likely secondary to cardiogenic shock  -Creatinine 1.89 , baseline unknown  -IV fluids started  -Avoid nephrotoxic agents  -Avoid hypotension  -Consider nephrology consult    Elevated LFTs, likely secondary to cardiogenic shock  -ALT 44  -  -At risk for shocked liver  -Continue to monitor liver function    Hypertensive disorder  -Patient borderline  "hypotensive  -Continue home Coreg when clinically appropriate    Hyperlipidemia  -Continue home atorvastatin    Presence of automatic implantable cardioverter-defibrillator    History of noncompliance with medical treatment  -Stressed importance of medical compliance given extensive cardiac history    Code Status: FULL  VTE Prophylaxis: Heparin gtt  PUD Prophylaxis: Diet ordered/Not indicated      HOSPITAL MEDICATIONS     SCHEDULED MEDICATIONS:  [START ON 8/4/2021] aspirin, 81 mg, Oral, Daily  atorvastatin, 40 mg, Oral, Nightly  insulin lispro, 0-14 Units, Subcutaneous, Q6H  [START ON 8/4/2021] piperacillin-tazobactam, 3.375 g, Intravenous, Q8H         CONTINUOUS INFUSIONS:    heparin, 9.26 Units/kg/hr, Last Rate: 9.26 Units/kg/hr (08/03/21 1916)  sodium chloride, 50 mL/hr, Last Rate: 50 mL/hr (08/03/21 2038)         PRN MEDICATIONS:     acetaminophen **OR** acetaminophen    aluminum-magnesium hydroxide-simethicone    dextrose    dextrose    glucagon (human recombinant)    heparin    heparin    insulin lispro **AND** insulin lispro    nitroglycerin    ondansetron **OR** ondansetron       OBJECTIVE     VITAL SIGNS:  BP (!) 87/54   Pulse 110   Temp 97.6 °F (36.4 °C) (Oral)   Resp 16   Ht 182.9 cm (72\")   Wt 108 kg (237 lb 7 oz)   SpO2 90%   BMI 32.20 kg/m²     Wt Readings from Last 3 Encounters:   08/03/21 108 kg (237 lb 7 oz)   10/27/20 104 kg (230 lb)   04/09/19 106 kg (234 lb)       INTAKE/OUTPUT:    Intake/Output Summary (Last 24 hours) at 8/3/2021 2154  Last data filed at 8/3/2021 2038  Gross per 24 hour   Intake 950 ml   Output --   Net 950 ml       PHYSICAL EXAM:   Constitutional:  Well developed, well nourished, no acute distress, non-toxic appearance   Eyes:  PERRL, conjunctiva normal, EOMI   HENT:  Atraumatic, external ears normal, nose normal, oropharynx moist, no pharyngeal exudates. Neck-normal range of motion, no tenderness  Respiratory: Expiratory diminished bilaterally, non-labored respirations " without accessory muscle use  Cardiovascular: Sinus tachycardia, + murmurs, no gallops, no rubs   GI:  Soft, nondistended, normal bowel sounds, nontender, no rebound or guarding   :  No costovertebral angle tenderness   Musculoskeletal:  No edema, no tenderness, no deformities  Integument:  Well hydrated, no rash   Neurologic:  Alert & oriented x 3, normal motor function, normal sensory function, no gross motor deficits noted   Psychiatric:  Speech and behavior appropriate      HISTORY     HISTORY:  Past Medical History:   Diagnosis Date    Coronary artery disease     Hyperlipidemia     Hypertension      No past surgical history on file.  No family history on file.  Social History     Socioeconomic History    Marital status:      Spouse name: Not on file    Number of children: Not on file    Years of education: Not on file    Highest education level: Not on file   Tobacco Use    Smoking status: Never Smoker    Smokeless tobacco: Former User     Types: Chew        HOME MEDICATIONS:   Prior to Admission medications    Medication Sig Start Date End Date Taking? Authorizing Provider   acetaminophen (TYLENOL) 325 MG tablet Take 650 mg by mouth As Needed for Mild Pain .   Yes Michael Angel MD   aspirin 325 MG tablet Take 325 mg by mouth Every Morning. 7/19/12  Yes Michael Angel MD   carvedilol (COREG) 25 MG tablet TAKE 1 TABLET TWICE A DAY 12/14/20  Yes Carl Appiah MD   dapagliflozin (Farxiga) 5 MG tablet tablet Take 5 mg by mouth Daily.   Yes Michael Angel MD   Entresto 49-51 MG tablet TAKE 1 TABLET TWICE A DAY 12/14/20  Yes Carl Appiah MD   glimepiride (AMARYL) 4 MG tablet Take 4 mg by mouth 2 (two) times a day. 7/19/12  Yes Michael Angel MD   metFORMIN (GLUCOPHAGE) 1000 MG tablet Take 1,000 mg by mouth 2 (Two) Times a Day. 10/12/20  Yes Michael Angel MD   naproxen sodium (ALEVE) 220 MG tablet Take 220 mg by mouth As Needed.   Yes Michael Angel MD    simvastatin (ZOCOR) 40 MG tablet TAKE 1 TABLET ONCE DAILY 12/14/20  Yes Carl Appiah MD   Flaxseed, Linseed, (FLAXSEED OIL) 1000 MG capsule FLAXSEED OIL 1000 MG CAPS 4/4/14 8/3/21  ProviderMichael MD   glucose blood (ONE TOUCH ULTRA TEST) test strip ONETOUCH ULTRA BLUE STRP 9/17/15 8/3/21  ProviderMichael MD       IMMUNIZATIONS:  Immunization History   Administered Date(s) Administered    Flu Vaccine Split Quad 11/02/2020    Tdap 01/01/2012        ALLERGIES:  Shellfish allergy      RESULTS     LABS:  Lab Results (last 24 hours)       Procedure Component Value Units Date/Time    COVID-19,CEPHEID,COR/INDERJIT/PAD/CLEMENCIA IN-HOUSE(OR EMERGENT/ADD-ON),NP SWAB IN TRANSPORT MEDIA 3-4 HR TAT, RT-PCR - Swab, Nasopharynx [[id number]]  (Normal) Collected: 08/03/21 1353    Specimen: Swab from Nasopharynx Updated: 08/03/21 1419     COVID19 Not Detected    Narrative:      Fact sheet for providers: https://www.fda.gov/media/431849/download     Fact sheet for patients: https://www.fda.gov/media/813659/download  Fact sheet for providers: https://www.fda.gov/media/674211/download    Fact sheet for patients: https://www.fda.gov/media/421641/download    Test performed by PCR.    Respiratory Panel PCR w/COVID-19(SARS-CoV-2) FARIDA/NEELAM/INDERJIT/PAD/COR/MAD/DAVONTE In-House, NP Swab in UTM/VTM, 3-4 HR TAT - Swab, Nasopharynx [[id number]]  (Normal) Collected: 08/03/21 1353    Specimen: Swab from Nasopharynx Updated: 08/03/21 1545     ADENOVIRUS, PCR Not Detected     Coronavirus 229E Not Detected     Coronavirus HKU1 Not Detected     Coronavirus NL63 Not Detected     Coronavirus OC43 Not Detected     COVID19 Not Detected     Human Metapneumovirus Not Detected     Human Rhinovirus/Enterovirus Not Detected     Influenza A PCR Not Detected     Influenza B PCR Not Detected     Parainfluenza Virus 1 Not Detected     Parainfluenza Virus 2 Not Detected     Parainfluenza Virus 3 Not Detected     Parainfluenza Virus 4 Not Detected     RSV, PCR Not  Detected     Bordetella pertussis pcr Not Detected     Bordetella parapertussis PCR Not Detected     Chlamydophila pneumoniae PCR Not Detected     Mycoplasma pneumo by PCR Not Detected    Narrative:      In the setting of a positive respiratory panel with a viral infection PLUS a negative procalcitonin without other underlying concern for bacterial infection, consider observing off antibiotics or discontinuation of antibiotics and continue supportive care. If the respiratory panel is positive for atypical bacterial infection (Bordetella pertussis, Chlamydophila pneumoniae, or Mycoplasma pneumoniae), consider antibiotic de-escalation to target atypical bacterial infection.    CBC & Differential [[id number]]  (Abnormal) Collected: 08/03/21 1425    Specimen: Blood Updated: 08/03/21 1435    Narrative:      The following orders were created for panel order CBC & Differential.  Procedure                               Abnormality         Status                     ---------                               -----------         ------                     CBC Auto Differential[[id number]]        Abnormal            Final result                 Please view results for these tests on the individual orders.    Comprehensive Metabolic Panel [[id number]]  (Abnormal) Collected: 08/03/21 1425    Specimen: Blood Updated: 08/03/21 1512     Glucose 196 mg/dL      BUN 25 mg/dL      Creatinine 1.89 mg/dL      Sodium 135 mmol/L      Potassium 4.8 mmol/L      Chloride 98 mmol/L      CO2 26.0 mmol/L      Calcium 8.6 mg/dL      Total Protein 6.8 g/dL      Albumin 3.80 g/dL      ALT (SGPT) 44 U/L      AST (SGOT) 126 U/L      Alkaline Phosphatase 95 U/L      Total Bilirubin 0.6 mg/dL      eGFR Non African Amer 40 mL/min/1.73      Globulin 3.0 gm/dL      A/G Ratio 1.3 g/dL      BUN/Creatinine Ratio 13.2     Anion Gap 11.0 mmol/L     Narrative:      GFR Normal >60  Chronic Kidney Disease <60  Kidney Failure <15      Troponin [[id number]]  (Abnormal)  "Collected: 08/03/21 1425    Specimen: Blood Updated: 08/03/21 1517     Troponin T 3.420 ng/mL      Comment: Result checked        Narrative:      Troponin T Reference Range:  <= 0.03 ng/mL-   Negative for AMI  >0.03 ng/mL-     Abnormal for myocardial necrosis.  Clinicians would have to utilize clinical acumen, EKG, Troponin and serial changes to determine if it is an Acute Myocardial Infarction or myocardial injury due to an underlying chronic condition.       Results may be falsely decreased if patient taking Biotin.      Blood Culture - Blood, Arm, Right [[id number]] Collected: 08/03/21 1425    Specimen: Blood from Arm, Right Updated: 08/03/21 1429    Procalcitonin [[id number]]  (Abnormal) Collected: 08/03/21 1425    Specimen: Blood Updated: 08/03/21 1515     Procalcitonin 0.36 ng/mL     Narrative:      As a Marker for Sepsis (Non-Neonates):     1. <0.5 ng/mL represents a low risk of severe sepsis and/or septic shock.  2. >2 ng/mL represents a high risk of severe sepsis and/or septic shock.    As a Marker for Lower Respiratory Tract Infections that require antibiotic therapy:  PCT on Admission     Antibiotic Therapy             6-12 Hrs later  >0.5                          Strongly Recommended            >0.25 - <0.5             Recommended  0.1 - 0.25                  Discouraged                       Remeasure/reassess PCT  <0.1                         Strongly Discouraged         Remeasure/reassess PCT      As 28 day mortality risk marker: \"Change in Procalcitonin Result\" (>80% or <=80%) if Day 0 (or Day 1) and Day 4 values are available. Refer to http://www.[employer]-pct-calculator.com/    Change in PCT <=80 %   A decrease of PCT levels below or equal to 80% defines a positive change in PCT test result representing a higher risk for 28-day all-cause mortality of patients diagnosed with severe sepsis or septic shock.    Change in PCT >80 %   A decrease of PCT levels of more than 80% defines a negative change in PCT " result representing a lower risk for 28-day all-cause mortality of patients diagnosed with severe sepsis or septic shock.              Results may be falsely decreased if patient taking Biotin.     Magnesium [[id number]]  (Normal) Collected: 08/03/21 1425    Specimen: Blood Updated: 08/03/21 1512     Magnesium 2.0 mg/dL     Phosphorus [[id number]]  (Normal) Collected: 08/03/21 1425    Specimen: Blood Updated: 08/03/21 1512     Phosphorus 3.5 mg/dL     CBC Auto Differential [[id number]]  (Abnormal) Collected: 08/03/21 1425    Specimen: Blood Updated: 08/03/21 1435     WBC 15.20 10*3/mm3      RBC 4.85 10*6/mm3      Hemoglobin 14.3 g/dL      Hematocrit 43.7 %      MCV 90.0 fL      MCH 29.5 pg      MCHC 32.7 g/dL      RDW 14.3 %      RDW-SD 44.6 fl      MPV 9.3 fL      Platelets 211 10*3/mm3      Neutrophil % 75.5 %      Lymphocyte % 13.3 %      Monocyte % 10.5 %      Eosinophil % 0.2 %      Basophil % 0.5 %      Neutrophils, Absolute 11.50 10*3/mm3      Lymphocytes, Absolute 2.00 10*3/mm3      Monocytes, Absolute 1.60 10*3/mm3      Eosinophils, Absolute 0.00 10*3/mm3      Basophils, Absolute 0.10 10*3/mm3      nRBC 0.0 /100 WBC     BNP [[id number]]  (Abnormal) Collected: 08/03/21 1425    Specimen: Blood Updated: 08/03/21 2010     proBNP 13,350.0 pg/mL     Narrative:      Among patients with dyspnea, NT-proBNP is highly sensitive for the detection of acute congestive heart failure. In addition NT-proBNP of <300 pg/ml effectively rules out acute congestive heart failure with 99% negative predictive value.    Results may be falsely decreased if patient taking Biotin.      C-reactive Protein [[id number]]  (Abnormal) Collected: 08/03/21 1425    Specimen: Blood Updated: 08/03/21 2013     C-Reactive Protein 14.42 mg/dL     POC Lactate [[id number]]  (Normal) Collected: 08/03/21 1430    Specimen: Blood Updated: 08/03/21 1618     Lactate 1.9 mmol/L      Comment: Serial Number: [id number]:  [id number]       Blood Culture - Blood,  Arm, Right [[id number]] Collected: 08/03/21 1444    Specimen: Blood from Arm, Right Updated: 08/03/21 1446    Urinalysis With Culture If Indicated - Urine, Clean Catch [[id number]]  (Abnormal) Collected: 08/03/21 1445    Specimen: Urine, Clean Catch Updated: 08/03/21 1502     Color, UA Yellow     Appearance, UA Clear     pH, UA <=5.0     Specific Gravity, UA 1.026     Glucose, UA >=1000 mg/dL (3+)     Ketones, UA Negative     Bilirubin, UA Negative     Blood, UA Negative     Protein, UA 30 mg/dL (1+)     Leuk Esterase, UA Negative     Nitrite, UA Negative     Urobilinogen, UA 0.2 E.U./dL    Urinalysis, Microscopic Only - Urine, Clean Catch [[id number]]  (Abnormal) Collected: 08/03/21 1445    Specimen: Urine, Clean Catch Updated: 08/03/21 1502     RBC, UA 0-2 /HPF      WBC, UA 0-2 /HPF      Bacteria, UA None Seen /HPF      Squamous Epithelial Cells, UA 0-2 /HPF      Hyaline Casts, UA 3-6 /LPF      Methodology Automated Microscopy    Protime-INR [[id number]]  (Normal) Collected: 08/03/21 1849    Specimen: Blood Updated: 08/03/21 1910     Protime 11.2 Seconds      INR 1.01    aPTT [[id number]]  (Abnormal) Collected: 08/03/21 1849    Specimen: Blood Updated: 08/03/21 1910     PTT 28.8 seconds     CBC & Differential [[id number]]  (Abnormal) Collected: 08/03/21 1849    Specimen: Blood Updated: 08/03/21 1855    Narrative:      The following orders were created for panel order CBC & Differential.  Procedure                               Abnormality         Status                     ---------                               -----------         ------                     CBC Auto Differential[[id number]]        Abnormal            Final result                 Please view results for these tests on the individual orders.    CBC Auto Differential [[id number]]  (Abnormal) Collected: 08/03/21 1849    Specimen: Blood Updated: 08/03/21 1855     WBC 14.50 10*3/mm3      RBC 4.68 10*6/mm3      Hemoglobin 13.8 g/dL      Hematocrit 41.7 %       MCV 89.2 fL      MCH 29.4 pg      MCHC 33.0 g/dL      RDW 13.9 %      RDW-SD 43.3 fl      MPV 9.1 fL      Platelets 181 10*3/mm3      Neutrophil % 77.5 %      Lymphocyte % 10.5 %      Monocyte % 11.2 %      Eosinophil % 0.2 %      Basophil % 0.6 %      Neutrophils, Absolute 11.20 10*3/mm3      Lymphocytes, Absolute 1.50 10*3/mm3      Monocytes, Absolute 1.60 10*3/mm3      Eosinophils, Absolute 0.00 10*3/mm3      Basophils, Absolute 0.10 10*3/mm3      nRBC 0.0 /100 WBC     Hemoglobin A1c [[id number]] Collected: 08/03/21 1849    Specimen: Blood Updated: 08/03/21 1956    POC Glucose Once [[id number]]  (Abnormal) Collected: 08/03/21 1923    Specimen: Blood Updated: 08/03/21 1924     Glucose 159 mg/dL      Comment: Serial Number: [id number]:  [id number]       MRSA Screen, PCR (Inpatient) - Swab, Nares [[id number]]  (Normal) Collected: 08/03/21 2004    Specimen: Swab from Nares Updated: 08/03/21 2122     MRSA PCR No MRSA Detected              MICRO:  Microbiology Results (last 10 days)       Procedure Component Value - Date/Time    MRSA Screen, PCR (Inpatient) - Swab, Nares [[id number]]  (Normal) Collected: 08/03/21 2004    Lab Status: Final result Specimen: Swab from Nares Updated: 08/03/21 2122     MRSA PCR No MRSA Detected    COVID-19,CEPHEID,COR/INDERJIT/PAD/CLEMENCIA IN-HOUSE(OR EMERGENT/ADD-ON),NP SWAB IN TRANSPORT MEDIA 3-4 HR TAT, RT-PCR - Swab, Nasopharynx [[id number]]  (Normal) Collected: 08/03/21 1353    Lab Status: Final result Specimen: Swab from Nasopharynx Updated: 08/03/21 1419     COVID19 Not Detected    Narrative:      Fact sheet for providers: https://www.fda.gov/media/386398/download     Fact sheet for patients: https://www.fda.gov/media/531025/download  Fact sheet for providers: https://www.fda.gov/media/752756/download    Fact sheet for patients: https://www.fda.gov/media/784089/download    Test performed by PCR.    Respiratory Panel PCR w/COVID-19(SARS-CoV-2) FARIDA/NEELAM/INDERJIT/PAD/COR/MAD/DAVONTE In-House, NP Swab in  UTM/VTM, 3-4 HR TAT - Swab, Nasopharynx [[id number]]  (Normal) Collected: 08/03/21 1353    Lab Status: Final result Specimen: Swab from Nasopharynx Updated: 08/03/21 1545     ADENOVIRUS, PCR Not Detected     Coronavirus 229E Not Detected     Coronavirus HKU1 Not Detected     Coronavirus NL63 Not Detected     Coronavirus OC43 Not Detected     COVID19 Not Detected     Human Metapneumovirus Not Detected     Human Rhinovirus/Enterovirus Not Detected     Influenza A PCR Not Detected     Influenza B PCR Not Detected     Parainfluenza Virus 1 Not Detected     Parainfluenza Virus 2 Not Detected     Parainfluenza Virus 3 Not Detected     Parainfluenza Virus 4 Not Detected     RSV, PCR Not Detected     Bordetella pertussis pcr Not Detected     Bordetella parapertussis PCR Not Detected     Chlamydophila pneumoniae PCR Not Detected     Mycoplasma pneumo by PCR Not Detected    Narrative:      In the setting of a positive respiratory panel with a viral infection PLUS a negative procalcitonin without other underlying concern for bacterial infection, consider observing off antibiotics or discontinuation of antibiotics and continue supportive care. If the respiratory panel is positive for atypical bacterial infection (Bordetella pertussis, Chlamydophila pneumoniae, or Mycoplasma pneumoniae), consider antibiotic de-escalation to target atypical bacterial infection.              RADIOLOGY STUDIES:  Imaging Results (Last 72 Hours)       Procedure Component Value Units Date/Time    XR Chest 1 View [[id number]] Collected: 08/03/21 1442     Updated: 08/03/21 1445    Narrative:      DATE OF EXAM:  8/3/2021 2:35 PM     PROCEDURE:  XR CHEST 1 VW-     INDICATIONS:  Cough and fever.      COMPARISON:  1/26/2015.     TECHNIQUE:   Single radiographic view of the chest was obtained.     FINDINGS:  The heart size is normal with postsurgical changes apparent. There is a  stable transvenous pacemaker in place. The pulmonary vascular markings  are  normal. The lungs and pleural spaces are clear of active disease.   The bony thorax is normal for age.       Impression:      No active disease.     Electronically Signed By-Antony Muro MD On:8/3/2021 2:43 PM  This report was finalized on [id number] by  Antony Muro MD.               I reviewed the patient's new clinical results.    I discussed the patient's findings and my recommendations with patient and nursing staff.  I have discussed plan with on-call attending physician, who agrees with this plan of care.    Appropriate PPE worn during assessment of patient per established guidelines.      Electronically signed by WHIT Whitney, 08/03/21, 9:54 PM EDT.     I personally have examined  and interviewed the patient. I have reviewed the history, data, problems, assessment and plan with our NP.  Critical care time in direct medical management (   ) minutes  Electronically signed by Alissa Vargas MD, D,ABS, 08/04/21, 11:15 PM EDT.

## 2021-08-04 NOTE — CONSULTS
Referring Provider: Dr. Vargas  Reason for Consultation: SATHISH    Subjective     Chief complaint   Chief Complaint   Patient presents with   • Shortness of Breath     Soa, coughing, fever,  chest pain/tightness, pt is  pending covid test from PMD.  pt is vaccinated       History of present illness:  40 year old male with history of CABG 2016, hyperlipidemia, hypertension, who comes in with chest pain and elevated troponin.  He has sathish with a creatinine of 1.8 yesterday and today 1.7.  He took some aleve when he developed the chest pain.  He does not know what his baseline creatinine is.  No family history of kidney disease but extensive family history of CAD.    Past Medical History:   Diagnosis Date   • Coronary artery disease    • Hyperlipidemia    • Hypertension      Past Surgical History:   Procedure Laterality Date   • CORONARY ARTERY BYPASS GRAFT     • LEFT HEART CATH       No family history on file.    Social History     Tobacco Use   • Smoking status: Never Smoker   • Smokeless tobacco: Former User     Types: Chew   Substance Use Topics   • Alcohol use: Not on file   • Drug use: Not on file     Medications Prior to Admission   Medication Sig Dispense Refill Last Dose   • acetaminophen (TYLENOL) 325 MG tablet Take 650 mg by mouth As Needed for Mild Pain .   8/3/2021 at 0300   • aspirin 325 MG tablet Take 325 mg by mouth Every Morning.   8/3/2021 at Unknown time   • carvedilol (COREG) 25 MG tablet TAKE 1 TABLET TWICE A  tablet 2 8/3/2021 at Unknown time   • dapagliflozin (Farxiga) 5 MG tablet tablet Take 5 mg by mouth Daily.   8/2/2021 at Unknown time   • Entresto 49-51 MG tablet TAKE 1 TABLET TWICE A  tablet 2 8/3/2021 at Unknown time   • glimepiride (AMARYL) 4 MG tablet Take 4 mg by mouth 2 (two) times a day.   8/3/2021 at Unknown time   • metFORMIN (GLUCOPHAGE) 1000 MG tablet Take 1,000 mg by mouth 2 (Two) Times a Day.   8/3/2021 at Unknown time   • naproxen sodium (ALEVE) 220 MG tablet  "Take 220 mg by mouth As Needed.   8/3/2021 at 1030   • simvastatin (ZOCOR) 40 MG tablet TAKE 1 TABLET ONCE DAILY 90 tablet 2 8/2/2021 at Unknown time     Allergies:  Shellfish allergy    Review of Systems  14 points review of system was performed and it was negative other than what noted above in the HPI    Objective     Vital Signs  Temp:  [97.6 °F (36.4 °C)] 97.6 °F (36.4 °C)  Heart Rate:  [] 110  Resp:  [16-20] 16  BP: ()/(38-73) 99/57    Flowsheet Rows      First Filed Value   Admission Height  182.9 cm (72\") Documented at 08/03/2021 1345   Admission Weight  108 kg (237 lb 7 oz) Documented at 08/03/2021 1345           I/O this shift:  In: 2038 [I.V.:1938; IV Piggyback:100]  Out: -   No intake/output data recorded.    Intake/Output Summary (Last 24 hours) at 8/4/2021 0643  Last data filed at 8/4/2021 0600  Gross per 24 hour   Intake 2038 ml   Output --   Net 2038 ml       Physical Exam:  General Appearance: alert, oriented x 3, no acute distress,   Skin: warm and dry  HEENT: pupils round and reactive to light, oral mucosa normal, nonicteric sclera  Neck: supple, no JVD, trachea midline  Lungs: CTA, unlabored breathing effort  Heart: RRR, normal S1 and S2, no S3, no rub  Abdomen: soft, nontender, normoactive bowels  : no palpable bladder,  Extremities: no edema, cyanosis or clubbing  Neuro: normal speech and mental status    Results Review:  Results for orders placed or performed during the hospital encounter of 08/03/21   COVID-19,CEPHEID,COR/INDERJIT/PAD/CLEMENCIA IN-HOUSE(OR EMERGENT/ADD-ON),NP SWAB IN TRANSPORT MEDIA 3-4 HR TAT, RT-PCR - Swab, Nasopharynx    Specimen: Nasopharynx; Swab   Result Value Ref Range    COVID19 Not Detected Not Detected - Ref. Range   Respiratory Panel PCR w/COVID-19(SARS-CoV-2) FARIDA/NEELAM/INDERJIT/PAD/COR/MAD/DAVONTE In-House, NP Swab in UTM/VTM, 3-4 HR TAT - Swab, Nasopharynx    Specimen: Nasopharynx; Swab   Result Value Ref Range    ADENOVIRUS, PCR Not Detected Not Detected    Coronavirus " 229E Not Detected Not Detected    Coronavirus HKU1 Not Detected Not Detected    Coronavirus NL63 Not Detected Not Detected    Coronavirus OC43 Not Detected Not Detected    COVID19 Not Detected Not Detected - Ref. Range    Human Metapneumovirus Not Detected Not Detected    Human Rhinovirus/Enterovirus Not Detected Not Detected    Influenza A PCR Not Detected Not Detected    Influenza B PCR Not Detected Not Detected    Parainfluenza Virus 1 Not Detected Not Detected    Parainfluenza Virus 2 Not Detected Not Detected    Parainfluenza Virus 3 Not Detected Not Detected    Parainfluenza Virus 4 Not Detected Not Detected    RSV, PCR Not Detected Not Detected    Bordetella pertussis pcr Not Detected Not Detected    Bordetella parapertussis PCR Not Detected Not Detected    Chlamydophila pneumoniae PCR Not Detected Not Detected    Mycoplasma pneumo by PCR Not Detected Not Detected   MRSA Screen, PCR (Inpatient) - Swab, Nares    Specimen: Nares; Swab   Result Value Ref Range    MRSA PCR No MRSA Detected No MRSA Detected   Comprehensive Metabolic Panel    Specimen: Blood   Result Value Ref Range    Glucose 196 (H) 65 - 99 mg/dL    BUN 25 (H) 6 - 20 mg/dL    Creatinine 1.89 (H) 0.76 - 1.27 mg/dL    Sodium 135 (L) 136 - 145 mmol/L    Potassium 4.8 3.5 - 5.2 mmol/L    Chloride 98 98 - 107 mmol/L    CO2 26.0 22.0 - 29.0 mmol/L    Calcium 8.6 8.6 - 10.5 mg/dL    Total Protein 6.8 6.0 - 8.5 g/dL    Albumin 3.80 3.50 - 5.20 g/dL    ALT (SGPT) 44 (H) 1 - 41 U/L    AST (SGOT) 126 (H) 1 - 40 U/L    Alkaline Phosphatase 95 39 - 117 U/L    Total Bilirubin 0.6 0.0 - 1.2 mg/dL    eGFR Non African Amer 40 (L) >60 mL/min/1.73    Globulin 3.0 gm/dL    A/G Ratio 1.3 g/dL    BUN/Creatinine Ratio 13.2 7.0 - 25.0    Anion Gap 11.0 5.0 - 15.0 mmol/L   Urinalysis With Culture If Indicated - Urine, Clean Catch    Specimen: Urine, Clean Catch   Result Value Ref Range    Color, UA Yellow Yellow, Straw    Appearance, UA Clear Clear    pH, UA <=5.0 5.0 -  8.0    Specific Gravity, UA 1.026 1.005 - 1.030    Glucose, UA >=1000 mg/dL (3+) (A) Negative    Ketones, UA Negative Negative    Bilirubin, UA Negative Negative    Blood, UA Negative Negative    Protein, UA 30 mg/dL (1+) (A) Negative    Leuk Esterase, UA Negative Negative    Nitrite, UA Negative Negative    Urobilinogen, UA 0.2 E.U./dL 0.2 - 1.0 E.U./dL   Troponin    Specimen: Blood   Result Value Ref Range    Troponin T 3.420 (C) 0.000 - 0.030 ng/mL   Procalcitonin    Specimen: Blood   Result Value Ref Range    Procalcitonin 0.36 (H) 0.00 - 0.25 ng/mL   Magnesium    Specimen: Blood   Result Value Ref Range    Magnesium 2.0 1.6 - 2.6 mg/dL   Phosphorus    Specimen: Blood   Result Value Ref Range    Phosphorus 3.5 2.5 - 4.5 mg/dL   CBC Auto Differential    Specimen: Blood   Result Value Ref Range    WBC 15.20 (H) 3.40 - 10.80 10*3/mm3    RBC 4.85 4.14 - 5.80 10*6/mm3    Hemoglobin 14.3 13.0 - 17.7 g/dL    Hematocrit 43.7 37.5 - 51.0 %    MCV 90.0 79.0 - 97.0 fL    MCH 29.5 26.6 - 33.0 pg    MCHC 32.7 31.5 - 35.7 g/dL    RDW 14.3 12.3 - 15.4 %    RDW-SD 44.6 37.0 - 54.0 fl    MPV 9.3 6.0 - 12.0 fL    Platelets 211 140 - 450 10*3/mm3    Neutrophil % 75.5 42.7 - 76.0 %    Lymphocyte % 13.3 (L) 19.6 - 45.3 %    Monocyte % 10.5 5.0 - 12.0 %    Eosinophil % 0.2 (L) 0.3 - 6.2 %    Basophil % 0.5 0.0 - 1.5 %    Neutrophils, Absolute 11.50 (H) 1.70 - 7.00 10*3/mm3    Lymphocytes, Absolute 2.00 0.70 - 3.10 10*3/mm3    Monocytes, Absolute 1.60 (H) 0.10 - 0.90 10*3/mm3    Eosinophils, Absolute 0.00 0.00 - 0.40 10*3/mm3    Basophils, Absolute 0.10 0.00 - 0.20 10*3/mm3    nRBC 0.0 0.0 - 0.2 /100 WBC   Urinalysis, Microscopic Only - Urine, Clean Catch    Specimen: Urine, Clean Catch   Result Value Ref Range    RBC, UA 0-2 (A) None Seen /HPF    WBC, UA 0-2 (A) None Seen /HPF    Bacteria, UA None Seen None Seen /HPF    Squamous Epithelial Cells, UA 0-2 None Seen, 0-2 /HPF    Hyaline Casts, UA 3-6 None Seen /LPF    Methodology  Automated Microscopy    Protime-INR    Specimen: Blood   Result Value Ref Range    Protime 11.2 9.6 - 11.7 Seconds    INR 1.01 0.93 - 1.10   aPTT    Specimen: Blood   Result Value Ref Range    PTT 28.8 (L) 61.0 - 76.5 seconds   CBC Auto Differential    Specimen: Blood   Result Value Ref Range    WBC 14.50 (H) 3.40 - 10.80 10*3/mm3    RBC 4.68 4.14 - 5.80 10*6/mm3    Hemoglobin 13.8 13.0 - 17.7 g/dL    Hematocrit 41.7 37.5 - 51.0 %    MCV 89.2 79.0 - 97.0 fL    MCH 29.4 26.6 - 33.0 pg    MCHC 33.0 31.5 - 35.7 g/dL    RDW 13.9 12.3 - 15.4 %    RDW-SD 43.3 37.0 - 54.0 fl    MPV 9.1 6.0 - 12.0 fL    Platelets 181 140 - 450 10*3/mm3    Neutrophil % 77.5 (H) 42.7 - 76.0 %    Lymphocyte % 10.5 (L) 19.6 - 45.3 %    Monocyte % 11.2 5.0 - 12.0 %    Eosinophil % 0.2 (L) 0.3 - 6.2 %    Basophil % 0.6 0.0 - 1.5 %    Neutrophils, Absolute 11.20 (H) 1.70 - 7.00 10*3/mm3    Lymphocytes, Absolute 1.50 0.70 - 3.10 10*3/mm3    Monocytes, Absolute 1.60 (H) 0.10 - 0.90 10*3/mm3    Eosinophils, Absolute 0.00 0.00 - 0.40 10*3/mm3    Basophils, Absolute 0.10 0.00 - 0.20 10*3/mm3    nRBC 0.0 0.0 - 0.2 /100 WBC   Troponin    Specimen: Blood   Result Value Ref Range    Troponin T 3.400 (C) 0.000 - 0.030 ng/mL   BNP    Specimen: Blood   Result Value Ref Range    proBNP 13,350.0 (H) 0.0 - 450.0 pg/mL   C-reactive Protein    Specimen: Blood   Result Value Ref Range    C-Reactive Protein 14.42 (H) 0.00 - 0.50 mg/dL   Troponin    Specimen: Blood   Result Value Ref Range    Troponin T 3.190 (C) 0.000 - 0.030 ng/mL   Lipid Panel    Specimen: Blood   Result Value Ref Range    Total Cholesterol 130 0 - 200 mg/dL    Triglycerides 133 0 - 150 mg/dL    HDL Cholesterol 51 40 - 60 mg/dL    LDL Cholesterol  56 0 - 100 mg/dL    VLDL Cholesterol 23 5 - 40 mg/dL    LDL/HDL Ratio 1.03    Magnesium    Specimen: Blood   Result Value Ref Range    Magnesium 2.0 1.6 - 2.6 mg/dL   Phosphorus    Specimen: Blood   Result Value Ref Range    Phosphorus 2.8 2.5 - 4.5  mg/dL   Comprehensive Metabolic Panel    Specimen: Blood   Result Value Ref Range    Glucose 152 (H) 65 - 99 mg/dL    BUN 29 (H) 6 - 20 mg/dL    Creatinine 1.72 (H) 0.76 - 1.27 mg/dL    Sodium 137 136 - 145 mmol/L    Potassium 4.4 3.5 - 5.2 mmol/L    Chloride 101 98 - 107 mmol/L    CO2 25.0 22.0 - 29.0 mmol/L    Calcium 8.2 (L) 8.6 - 10.5 mg/dL    Total Protein 6.5 6.0 - 8.5 g/dL    Albumin 3.60 3.50 - 5.20 g/dL    ALT (SGPT) 46 (H) 1 - 41 U/L    AST (SGOT) 110 (H) 1 - 40 U/L    Alkaline Phosphatase 96 39 - 117 U/L    Total Bilirubin 0.7 0.0 - 1.2 mg/dL    eGFR Non African Amer 44 (L) >60 mL/min/1.73    Globulin 2.9 gm/dL    A/G Ratio 1.2 g/dL    BUN/Creatinine Ratio 16.9 7.0 - 25.0    Anion Gap 11.0 5.0 - 15.0 mmol/L   CBC Auto Differential    Specimen: Blood   Result Value Ref Range    WBC 11.60 (H) 3.40 - 10.80 10*3/mm3    RBC 4.41 4.14 - 5.80 10*6/mm3    Hemoglobin 13.2 13.0 - 17.7 g/dL    Hematocrit 40.0 37.5 - 51.0 %    MCV 90.6 79.0 - 97.0 fL    MCH 29.8 26.6 - 33.0 pg    MCHC 32.9 31.5 - 35.7 g/dL    RDW 14.2 12.3 - 15.4 %    RDW-SD 44.6 37.0 - 54.0 fl    MPV 9.6 6.0 - 12.0 fL    Platelets 154 140 - 450 10*3/mm3    Neutrophil % 74.2 42.7 - 76.0 %    Lymphocyte % 14.9 (L) 19.6 - 45.3 %    Monocyte % 10.4 5.0 - 12.0 %    Eosinophil % 0.2 (L) 0.3 - 6.2 %    Basophil % 0.3 0.0 - 1.5 %    Neutrophils, Absolute 8.60 (H) 1.70 - 7.00 10*3/mm3    Lymphocytes, Absolute 1.70 0.70 - 3.10 10*3/mm3    Monocytes, Absolute 1.20 (H) 0.10 - 0.90 10*3/mm3    Eosinophils, Absolute 0.00 0.00 - 0.40 10*3/mm3    Basophils, Absolute 0.00 0.00 - 0.20 10*3/mm3    nRBC 0.1 0.0 - 0.2 /100 WBC   aPTT    Specimen: Blood   Result Value Ref Range    PTT 36.5 (L) 61.0 - 76.5 seconds   POC Lactate    Specimen: Blood   Result Value Ref Range    Lactate 1.9 0.5 - 2.0 mmol/L   POC Glucose Once    Specimen: Blood   Result Value Ref Range    Glucose 159 (H) 70 - 105 mg/dL   POC Glucose Once    Specimen: Blood   Result Value Ref Range    Glucose  163 (H) 70 - 105 mg/dL   POC Glucose Once    Specimen: Blood   Result Value Ref Range    Glucose 159 (H) 70 - 105 mg/dL   ECG 12 Lead   Result Value Ref Range    QT Interval 355 ms   ECG 12 Lead   Result Value Ref Range    QT Interval 321 ms   Gold Top - SST   Result Value Ref Range    Extra Tube Hold for add-ons.      Imaging Results (Last 72 Hours)     Procedure Component Value Units Date/Time    XR Chest 1 View [[id number]] Collected: 08/03/21 1442     Updated: 08/03/21 1445    Narrative:      DATE OF EXAM:  8/3/2021 2:35 PM     PROCEDURE:  XR CHEST 1 VW-     INDICATIONS:  Cough and fever.      COMPARISON:  1/26/2015.     TECHNIQUE:   Single radiographic view of the chest was obtained.     FINDINGS:  The heart size is normal with postsurgical changes apparent. There is a  stable transvenous pacemaker in place. The pulmonary vascular markings  are normal. The lungs and pleural spaces are clear of active disease.   The bony thorax is normal for age.       Impression:      No active disease.     Electronically Signed By-Antony Muro MD On:8/3/2021 2:43 PM  This report was finalized on [id number] by  Antony Muro MD.            aspirin, 81 mg, Oral, Daily  atorvastatin, 40 mg, Oral, Nightly  insulin lispro, 0-14 Units, Subcutaneous, Q6H  piperacillin-tazobactam, 3.375 g, Intravenous, Q8H      heparin, 9.26 Units/kg/hr, Last Rate: 9.26 Units/kg/hr (08/04/21 0020)  sodium chloride, 50 mL/hr, Last Rate: 50 mL/hr (08/04/21 0059)        Assessment/Plan       NSTEMI (non-ST elevated myocardial infarction) (CMS/[facility])    Cardiomyopathy, ischemic    Coronary artery disease    Heart failure (CMS/[facility])    Hyperlipidemia    Hypertensive disorder    Presence of automatic implantable cardioverter-defibrillator    Cardiogenic shock (CMS/[facility])    Severe sepsis, not felt to be septic shock    Type 2 diabetes mellitus with hyperglycemia (CMS/[facility])    SATHISH (acute kidney injury) (CMS/[facility])    H/O noncompliance with medical treatment,  presenting hazards to health    1.  Renal insufficiency: Reduced perfusion in the setting of nsaid use?  He does not seem volume overloaded to me despite elevated bnp.    2.  Chest pain in the setting ischemic cardiomyopathy  3.  History of CABG  4.  Hyperlipidemia    Plan:  Would like some time for improvement in renal function if not an emergency cardiac catheterization.  Explained to patient the risks and benefits of cardiac catheterization in terms of kidney function.  Will start mucomyst and would gently hydrate.  Check ua and renal us.    I discussed the patient's findings and my recommendations with patient    Karen Espinosa MD  08/04/21  06:43 EDT      Much of this encounter note is an electronic transcription/translation of spoken language to printed text. The electronic translation of spoken language may permit erroneous, or at times, nonsensical words or phrases to be inadvertently transcribed; Although I have reviewed the note for such errors, some may still exist

## 2021-08-04 NOTE — CONSULTS
Referring Provider: Intensivist  Reason for Consultation: Non-STEMI    Patient Care Team:  Mey Cox as PCP - General    Chief complaint chest pain or shortness of breath    Subjective .     History of present illness:  Yogi Tucker is a 40 y.o. male with history of coronary artery disease status post coronary artery bypass surgery history of congestive heart failure with ischemic cardiomyopathy status post ICD placement history of hypertension hyperlipidemia renal insufficiency diabetes presented to the hospital with complaints of chest pain shortness of breath for the last 3 days.  Patient states that he had significant chest pain 2 days ago and he was alone at home and he went to the bathroom and then passed out.  He did not have any palpitations at that time but has been having occasional palpitations.  He has been having increasing shortness of breath.  No complaints of any PND orthopnea.  He also had some dizziness prior to having a syncopal episode.  He has some swelling of the feet.  He says that he is taking his medicines regularly.  He admitted to using NSAIDs recently for pain.    Review of Systems   Constitutional: Negative for fever and malaise/fatigue.   HENT: Negative for ear pain and nosebleeds.    Eyes: Negative for blurred vision and double vision.   Cardiovascular: Positive for chest pain and syncope. Negative for dyspnea on exertion and palpitations.   Respiratory: Positive for shortness of breath. Negative for cough.    Skin: Negative for rash.   Musculoskeletal: Negative for joint pain.   Gastrointestinal: Negative for abdominal pain, nausea and vomiting.   Neurological: Negative for focal weakness and headaches.   Psychiatric/Behavioral: Negative for depression. The patient is not nervous/anxious.    All other systems reviewed and are negative.      History  Past Medical History:   Diagnosis Date   • Coronary artery disease    • Hyperlipidemia    • Hypertension        Past Surgical  History:   Procedure Laterality Date   • CORONARY ARTERY BYPASS GRAFT     • LEFT HEART CATH         No family history on file.    Social History     Tobacco Use   • Smoking status: Never Smoker   • Smokeless tobacco: Former User     Types: Chew   Substance Use Topics   • Alcohol use: Not on file   • Drug use: Not on file        Medications Prior to Admission   Medication Sig Dispense Refill Last Dose   • acetaminophen (TYLENOL) 325 MG tablet Take 650 mg by mouth As Needed for Mild Pain .   8/3/2021 at 0300   • aspirin 325 MG tablet Take 325 mg by mouth Every Morning.   8/3/2021 at Unknown time   • carvedilol (COREG) 25 MG tablet TAKE 1 TABLET TWICE A  tablet 2 8/3/2021 at Unknown time   • dapagliflozin (Farxiga) 5 MG tablet tablet Take 5 mg by mouth Daily.   8/2/2021 at Unknown time   • Entresto 49-51 MG tablet TAKE 1 TABLET TWICE A  tablet 2 8/3/2021 at Unknown time   • glimepiride (AMARYL) 4 MG tablet Take 4 mg by mouth 2 (two) times a day.   8/3/2021 at Unknown time   • metFORMIN (GLUCOPHAGE) 1000 MG tablet Take 1,000 mg by mouth 2 (Two) Times a Day.   8/3/2021 at Unknown time   • naproxen sodium (ALEVE) 220 MG tablet Take 220 mg by mouth As Needed.   8/3/2021 at 1030   • simvastatin (ZOCOR) 40 MG tablet TAKE 1 TABLET ONCE DAILY 90 tablet 2 8/2/2021 at Unknown time         Shellfish allergy    Scheduled Meds:Acetylcysteine, 1,200 mg, Oral, BID  aspirin, 81 mg, Oral, Daily  atorvastatin, 40 mg, Oral, Nightly  insulin lispro, 0-14 Units, Subcutaneous, Q6H      Continuous Infusions:heparin, 9.26 Units/kg/hr, Last Rate: 13.26 Units/kg/hr (08/04/21 1515)  sodium chloride, 75 mL/hr, Last Rate: 75 mL/hr (08/04/21 1808)      PRN Meds:.•  acetaminophen **OR** acetaminophen  •  aluminum-magnesium hydroxide-simethicone  •  dextrose  •  dextrose  •  glucagon (human recombinant)  •  heparin  •  heparin  •  insulin lispro **AND** insulin lispro  •  nitroglycerin  •  ondansetron **OR** ondansetron    Objective  "    VITAL SIGNS  Vitals:    08/04/21 1100 08/04/21 1200 08/04/21 1600 08/04/21 1619   BP: 106/75  93/70 108/77   BP Location:   Right arm    Patient Position:   Lying    Pulse: 107 111 108    Resp:  22 27    Temp:  97.6 °F (36.4 °C) 98.5 °F (36.9 °C)    TempSrc:  Oral Oral    SpO2: 93% 90% 96%    Weight:    110 kg (243 lb)   Height:    182.9 cm (72\")       Flowsheet Rows      First Filed Value   Admission Height  182.9 cm (72\") Documented at 08/03/2021 1345   Admission Weight  108 kg (237 lb 7 oz) Documented at 08/03/2021 1345           TELEMETRY: Sinus rhythm nonspecific ST segment abnormality    Physical Exam:  Constitutional:       Appearance: Well-developed.   Eyes:      General: No scleral icterus.     Conjunctiva/sclera: Conjunctivae normal.      Pupils: Pupils are equal, round, and reactive to light.   HENT:      Head: Normocephalic and atraumatic.   Neck:      Vascular: No carotid bruit or JVD.   Pulmonary:      Effort: Pulmonary effort is normal.      Breath sounds: Normal breath sounds. No wheezing. No rales.   Cardiovascular:      Normal rate. Regular rhythm.   Pulses:     Intact distal pulses.   Abdominal:      General: Bowel sounds are normal.      Palpations: Abdomen is soft.   Musculoskeletal: Normal range of motion.      Cervical back: Normal range of motion and neck supple. Skin:     General: Skin is warm and dry.      Findings: No rash.   Neurological:      Mental Status: Alert.      Comments: No focal deficits          Results Review:   I reviewed the patient's new clinical results.  Lab Results (last 24 hours)     Procedure Component Value Units Date/Time    POC Glucose Once [[id number]]  (Abnormal) Collected: 08/04/21 1601    Specimen: Blood Updated: 08/04/21 1601     Glucose 174 mg/dL      Comment: Serial Number: [id number]:  [id number]       aPTT [[id number]]  (Abnormal) Collected: 08/04/21 1435    Specimen: Blood Updated: 08/04/21 1503     PTT 40.8 seconds     Blood Culture - Blood, Arm, " Right [[id number]] Collected: 08/03/21 1444    Specimen: Blood from Arm, Right Updated: 08/04/21 1500     Blood Culture No growth at 24 hours    Blood Culture - Blood, Arm, Right [[id number]] Collected: 08/03/21 1425    Specimen: Blood from Arm, Right Updated: 08/04/21 1431     Blood Culture No growth at 24 hours    Troponin [[id number]]  (Abnormal) Collected: 08/04/21 1225    Specimen: Blood Updated: 08/04/21 1301     Troponin T 3.570 ng/mL     Narrative:      Troponin T Reference Range:  <= 0.03 ng/mL-   Negative for AMI  >0.03 ng/mL-     Abnormal for myocardial necrosis.  Clinicians would have to utilize clinical acumen, EKG, Troponin and serial changes to determine if it is an Acute Myocardial Infarction or myocardial injury due to an underlying chronic condition.       Results may be falsely decreased if patient taking Biotin.      Basic Metabolic Panel [[id number]]  (Abnormal) Collected: 08/04/21 1225    Specimen: Blood Updated: 08/04/21 1257     Glucose 137 mg/dL      BUN 31 mg/dL      Creatinine 1.79 mg/dL      Sodium 137 mmol/L      Potassium 5.0 mmol/L      Chloride 101 mmol/L      CO2 23.0 mmol/L      Calcium 8.6 mg/dL      eGFR Non African Amer 42 mL/min/1.73      BUN/Creatinine Ratio 17.3     Anion Gap 13.0 mmol/L     Narrative:      GFR Normal >60  Chronic Kidney Disease <60  Kidney Failure <15      POC Glucose Once [[id number]]  (Abnormal) Collected: 08/04/21 1151    Specimen: Blood Updated: 08/04/21 1152     Glucose 134 mg/dL      Comment: Serial Number: [id number]:  [id number]       Hemoglobin A1c [[id number]]  (Abnormal) Collected: 08/03/21 1849    Specimen: Blood Updated: 08/04/21 1042     Hemoglobin A1C 7.5 %     Narrative:      Hemoglobin A1C Reference Range:    <5.7 %        Normal  5.7-6.4 %     Increased risk for diabetes  > 6.4 %        Diabetes       These guidelines have been recommended by the American Diabetic Association for Hgb A1c.      The following 2010 guidelines have been  recommended by the American Diabetes Association for Hemoglobin A1c.    HBA1c 5.7-6.4% Increased risk for future diabetes (pre-diabetes)  HBA1c     >6.4% Diabetes      POC Glucose Once [[id number]]  (Abnormal) Collected: 08/04/21 0739    Specimen: Blood Updated: 08/04/21 0740     Glucose 142 mg/dL      Comment: Serial Number: [id number]:  [id number]       POC Glucose Once [[id number]]  (Abnormal) Collected: 08/04/21 0640    Specimen: Blood Updated: 08/04/21 0641     Glucose 159 mg/dL      Comment: Serial Number: [id number]:  [id number]       Troponin [[id number]]  (Abnormal) Collected: 08/04/21 0502    Specimen: Blood Updated: 08/04/21 0602     Troponin T 3.400 ng/mL     Narrative:      Troponin T Reference Range:  <= 0.03 ng/mL-   Negative for AMI  >0.03 ng/mL-     Abnormal for myocardial necrosis.  Clinicians would have to utilize clinical acumen, EKG, Troponin and serial changes to determine if it is an Acute Myocardial Infarction or myocardial injury due to an underlying chronic condition.       Results may be falsely decreased if patient taking Biotin.      Comprehensive Metabolic Panel [[id number]]  (Abnormal) Collected: 08/04/21 0502    Specimen: Blood Updated: 08/04/21 0558     Glucose 152 mg/dL      BUN 29 mg/dL      Creatinine 1.72 mg/dL      Sodium 137 mmol/L      Potassium 4.4 mmol/L      Chloride 101 mmol/L      CO2 25.0 mmol/L      Calcium 8.2 mg/dL      Total Protein 6.5 g/dL      Albumin 3.60 g/dL      ALT (SGPT) 46 U/L      AST (SGOT) 110 U/L      Alkaline Phosphatase 96 U/L      Total Bilirubin 0.7 mg/dL      eGFR Non African Amer 44 mL/min/1.73      Globulin 2.9 gm/dL      A/G Ratio 1.2 g/dL      BUN/Creatinine Ratio 16.9     Anion Gap 11.0 mmol/L     Narrative:      GFR Normal >60  Chronic Kidney Disease <60  Kidney Failure <15      Lipid Panel [[id number]] Collected: 08/04/21 0502    Specimen: Blood Updated: 08/04/21 0558     Total Cholesterol 130 mg/dL      Triglycerides 133 mg/dL       HDL Cholesterol 51 mg/dL      LDL Cholesterol  56 mg/dL      VLDL Cholesterol 23 mg/dL      LDL/HDL Ratio 1.03    Narrative:      Cholesterol Reference Ranges  (U.S. Department of Health and Human Services ATP III Classifications)    Desirable          <200 mg/dL  Borderline High    200-239 mg/dL  High Risk          >240 mg/dL      Triglyceride Reference Ranges  (U.S. Department of Health and Human Services ATP III Classifications)    Normal           <150 mg/dL  Borderline High  150-199 mg/dL  High             200-499 mg/dL  Very High        >500 mg/dL    HDL Reference Ranges  (U.S. Department of Health and Human Services ATP III Classifcations)    Low     <40 mg/dl (major risk factor for CHD)  High    >60 mg/dl ('negative' risk factor for CHD)        LDL Reference Ranges  (U.S. Department of Health and Human Services ATP III Classifcations)    Optimal          <100 mg/dL  Near Optimal     100-129 mg/dL  Borderline High  130-159 mg/dL  High             160-189 mg/dL  Very High        >189 mg/dL    Phosphorus [[id number]]  (Normal) Collected: 08/04/21 0502    Specimen: Blood Updated: 08/04/21 0558     Phosphorus 2.8 mg/dL     Magnesium [[id number]]  (Normal) Collected: 08/04/21 0502    Specimen: Blood Updated: 08/04/21 0558     Magnesium 2.0 mg/dL     aPTT [[id number]]  (Abnormal) Collected: 08/04/21 0502    Specimen: Blood Updated: 08/04/21 0537     PTT 36.5 seconds     CBC & Differential [[id number]]  (Abnormal) Collected: 08/04/21 0502    Specimen: Blood Updated: 08/04/21 0515    Narrative:      The following orders were created for panel order CBC & Differential.  Procedure                               Abnormality         Status                     ---------                               -----------         ------                     CBC Auto Differential[[id number]]        Abnormal            Final result                 Please view results for these tests on the individual orders.    CBC Auto Differential  [[id number]]  (Abnormal) Collected: 08/04/21 0502    Specimen: Blood Updated: 08/04/21 0515     WBC 11.60 10*3/mm3      RBC 4.41 10*6/mm3      Hemoglobin 13.2 g/dL      Hematocrit 40.0 %      MCV 90.6 fL      MCH 29.8 pg      MCHC 32.9 g/dL      RDW 14.2 %      RDW-SD 44.6 fl      MPV 9.6 fL      Platelets 154 10*3/mm3      Neutrophil % 74.2 %      Lymphocyte % 14.9 %      Monocyte % 10.4 %      Eosinophil % 0.2 %      Basophil % 0.3 %      Neutrophils, Absolute 8.60 10*3/mm3      Lymphocytes, Absolute 1.70 10*3/mm3      Monocytes, Absolute 1.20 10*3/mm3      Eosinophils, Absolute 0.00 10*3/mm3      Basophils, Absolute 0.00 10*3/mm3      nRBC 0.1 /100 WBC     POC Glucose Once [[id number]]  (Abnormal) Collected: 08/04/21 0228    Specimen: Blood Updated: 08/04/21 0230     Glucose 163 mg/dL      Comment: Serial Number: [id number]:  [id number]       Troponin [[id number]]  (Abnormal) Collected: 08/03/21 2302    Specimen: Blood Updated: 08/03/21 2343     Troponin T 3.190 ng/mL     Narrative:      Troponin T Reference Range:  <= 0.03 ng/mL-   Negative for AMI  >0.03 ng/mL-     Abnormal for myocardial necrosis.  Clinicians would have to utilize clinical acumen, EKG, Troponin and serial changes to determine if it is an Acute Myocardial Infarction or myocardial injury due to an underlying chronic condition.       Results may be falsely decreased if patient taking Biotin.            Imaging Results (Last 24 Hours)     ** No results found for the last 24 hours. **          EKG      I personally viewed and interpreted the patient's EKG/Telemetry data:    ECHOCARDIOGRAM:      STRESS MYOVIEW:    CARDIAC CATHETERIZATION:    OTHER:         Assessment/Plan     Principal Problem:    NSTEMI (non-ST elevated myocardial infarction) (CMS/HCC)  Active Problems:    Cardiomyopathy, ischemic    Coronary artery disease    Heart failure (CMS/HCC)    Hyperlipidemia    Hypertensive disorder    Presence of automatic implantable  cardioverter-defibrillator    Cardiogenic shock (CMS/[facility])    Severe sepsis, not felt to be septic shock    Type 2 diabetes mellitus with hyperglycemia (CMS/[facility])    SATHISH (acute kidney injury) (CMS/[facility])    H/O noncompliance with medical treatment, presenting hazards to health      Patient presented with chest pain or shortness of breath and had non-STEMI  Patient is currently on aspirin heparin and other home medicines including beta-blockers and statins  Patient is currently on Entresto and his renal function will be monitored and will allow nephrologist to decide about continuing Entresto versus starting him on hydralazine  Patient is having an echocardiogram performed for LV function valvular abnormalities  Blood pressure was low and hence he was started on low-dose dopamine  Patient sugar levels and lipid levels are followed by the primary care doctor  Patient's renal function is being monitored by the nephrologist and will clear for cardiac catheterization  Discussed with patient family about cardiac catheterization.  Procedure risks and benefits including arrhythmias stroke death MI and renal failure have been explained.    I discussed the patients findings and my recommendations with patient and nurse and family    Carl Appiah MD  08/04/21  18:13 EDT

## 2021-08-05 ENCOUNTER — APPOINTMENT (OUTPATIENT)
Dept: ULTRASOUND IMAGING | Facility: HOSPITAL | Age: 41
End: 2021-08-05

## 2021-08-05 LAB
ALBUMIN SERPL-MCNC: 3.6 G/DL (ref 3.5–5.2)
ALBUMIN/GLOB SERPL: 1.1 G/DL
ALP SERPL-CCNC: 100 U/L (ref 39–117)
ALT SERPL W P-5'-P-CCNC: 105 U/L (ref 1–41)
ANION GAP SERPL CALCULATED.3IONS-SCNC: 10 MMOL/L (ref 5–15)
ANION GAP SERPL CALCULATED.3IONS-SCNC: 11 MMOL/L (ref 5–15)
APTT PPP: 46.1 SECONDS (ref 61–76.5)
APTT PPP: 46.7 SECONDS (ref 61–76.5)
APTT PPP: 49.6 SECONDS (ref 61–76.5)
AST SERPL-CCNC: 151 U/L (ref 1–40)
BASOPHILS # BLD AUTO: 0.1 10*3/MM3 (ref 0–0.2)
BASOPHILS # BLD AUTO: 0.1 10*3/MM3 (ref 0–0.2)
BASOPHILS NFR BLD AUTO: 0.6 % (ref 0–1.5)
BASOPHILS NFR BLD AUTO: 0.7 % (ref 0–1.5)
BH CV ECHO MEAS - ACS: 2.1 CM
BH CV ECHO MEAS - AO MAX PG (FULL): 1.5 MMHG
BH CV ECHO MEAS - AO MAX PG: 3.5 MMHG
BH CV ECHO MEAS - AO MEAN PG (FULL): 0.65 MMHG
BH CV ECHO MEAS - AO MEAN PG: 1.9 MMHG
BH CV ECHO MEAS - AO ROOT AREA (BSA CORRECTED): 1.3
BH CV ECHO MEAS - AO ROOT AREA: 7.1 CM^2
BH CV ECHO MEAS - AO ROOT DIAM: 3 CM
BH CV ECHO MEAS - AO V2 MAX: 94 CM/SEC
BH CV ECHO MEAS - AO V2 MEAN: 61.8 CM/SEC
BH CV ECHO MEAS - AO V2 VTI: 10.6 CM
BH CV ECHO MEAS - AORTIC HR: 114.2 BPM
BH CV ECHO MEAS - AORTIC R-R: 0.53 SEC
BH CV ECHO MEAS - ASC AORTA: 3.1 CM
BH CV ECHO MEAS - AVA(I,A): 3.5 CM^2
BH CV ECHO MEAS - AVA(I,D): 3.5 CM^2
BH CV ECHO MEAS - AVA(V,A): 2.8 CM^2
BH CV ECHO MEAS - AVA(V,D): 2.8 CM^2
BH CV ECHO MEAS - BSA(HAYCOCK): 2.4 M^2
BH CV ECHO MEAS - BSA: 2.3 M^2
BH CV ECHO MEAS - BZI_BMI: 33 KILOGRAMS/M^2
BH CV ECHO MEAS - BZI_METRIC_HEIGHT: 182.9 CM
BH CV ECHO MEAS - BZI_METRIC_WEIGHT: 110.2 KG
BH CV ECHO MEAS - CI(AO): 3.7 L/MIN/M^2
BH CV ECHO MEAS - CI(LVOT): 1.8 L/MIN/M^2
BH CV ECHO MEAS - CO(AO): 8.6 L/MIN
BH CV ECHO MEAS - CO(LVOT): 4.2 L/MIN
BH CV ECHO MEAS - CONTRAST EF 4CH: 11 CM2
BH CV ECHO MEAS - EDV(CUBED): 140.8 ML
BH CV ECHO MEAS - EDV(MOD-SP4): 187.5 ML
BH CV ECHO MEAS - EDV(TEICH): 129.7 ML
BH CV ECHO MEAS - EF(CUBED): 7.5 %
BH CV ECHO MEAS - EF(MOD-SP4): 10.6 %
BH CV ECHO MEAS - EF(TEICH): 5.8 %
BH CV ECHO MEAS - ESV(CUBED): 130.3 ML
BH CV ECHO MEAS - ESV(MOD-SP4): 167.7 ML
BH CV ECHO MEAS - ESV(TEICH): 122.1 ML
BH CV ECHO MEAS - FS: 2.6 %
BH CV ECHO MEAS - IVS/LVPW: 0.76
BH CV ECHO MEAS - IVSD: 1.2 CM
BH CV ECHO MEAS - LV DIASTOLIC VOL/BSA (35-75): 81 ML/M^2
BH CV ECHO MEAS - LV MASS(C)D: 291.5 GRAMS
BH CV ECHO MEAS - LV MASS(C)DI: 125.9 GRAMS/M^2
BH CV ECHO MEAS - LV MAX PG: 2 MMHG
BH CV ECHO MEAS - LV MEAN PG: 1.2 MMHG
BH CV ECHO MEAS - LV SYSTOLIC VOL/BSA (12-30): 72.4 ML/M^2
BH CV ECHO MEAS - LV V1 MAX: 70.8 CM/SEC
BH CV ECHO MEAS - LV V1 MEAN: 51 CM/SEC
BH CV ECHO MEAS - LV V1 VTI: 9.9 CM
BH CV ECHO MEAS - LVIDD: 5.2 CM
BH CV ECHO MEAS - LVIDS: 5.1 CM
BH CV ECHO MEAS - LVOT AREA: 3.7 CM^2
BH CV ECHO MEAS - LVOT DIAM: 2.2 CM
BH CV ECHO MEAS - LVPWD: 1.5 CM
BH CV ECHO MEAS - MR MAX PG: 72.3 MMHG
BH CV ECHO MEAS - MR MAX VEL: 425.1 CM/SEC
BH CV ECHO MEAS - MV A MAX VEL: 66.1 CM/SEC
BH CV ECHO MEAS - MV DEC SLOPE: 833.8 CM/SEC^2
BH CV ECHO MEAS - MV DEC TIME: 0.13 SEC
BH CV ECHO MEAS - MV E MAX VEL: 111.8 CM/SEC
BH CV ECHO MEAS - MV E/A: 1.7
BH CV ECHO MEAS - MV MAX PG: 6 MMHG
BH CV ECHO MEAS - MV MEAN PG: 2.9 MMHG
BH CV ECHO MEAS - MV V2 MAX: 122 CM/SEC
BH CV ECHO MEAS - MV V2 MEAN: 80.3 CM/SEC
BH CV ECHO MEAS - MV V2 VTI: 19.9 CM
BH CV ECHO MEAS - MVA(VTI): 1.9 CM^2
BH CV ECHO MEAS - PA ACC TIME: 0.09 SEC
BH CV ECHO MEAS - PA MAX PG (FULL): 2.4 MMHG
BH CV ECHO MEAS - PA MAX PG: 3.6 MMHG
BH CV ECHO MEAS - PA MEAN PG (FULL): 1.6 MMHG
BH CV ECHO MEAS - PA MEAN PG: 2.1 MMHG
BH CV ECHO MEAS - PA PR(ACCEL): 37.9 MMHG
BH CV ECHO MEAS - PA V2 MAX: 95.1 CM/SEC
BH CV ECHO MEAS - PA V2 MEAN: 68.2 CM/SEC
BH CV ECHO MEAS - PA V2 VTI: 11.9 CM
BH CV ECHO MEAS - PULM DIAS VEL: 49.5 CM/SEC
BH CV ECHO MEAS - PULM S/D: 0.57
BH CV ECHO MEAS - PULM SYS VEL: 28.3 CM/SEC
BH CV ECHO MEAS - PVA(I,A): 5.5 CM^2
BH CV ECHO MEAS - PVA(I,D): 5.5 CM^2
BH CV ECHO MEAS - PVA(V,A): 5.1 CM^2
BH CV ECHO MEAS - PVA(V,D): 5.1 CM^2
BH CV ECHO MEAS - QP/QS: 1.8
BH CV ECHO MEAS - RAP SYSTOLE: 3 MMHG
BH CV ECHO MEAS - RV MAX PG: 1.2 MMHG
BH CV ECHO MEAS - RV MEAN PG: 0.53 MMHG
BH CV ECHO MEAS - RV V1 MAX: 54.2 CM/SEC
BH CV ECHO MEAS - RV V1 MEAN: 33.6 CM/SEC
BH CV ECHO MEAS - RV V1 VTI: 7.3 CM
BH CV ECHO MEAS - RVDD: 2.9 CM
BH CV ECHO MEAS - RVOT AREA: 9 CM^2
BH CV ECHO MEAS - RVOT DIAM: 3.4 CM
BH CV ECHO MEAS - RVSP: 45.5 MMHG
BH CV ECHO MEAS - SI(AO): 32.5 ML/M^2
BH CV ECHO MEAS - SI(CUBED): 4.5 ML/M^2
BH CV ECHO MEAS - SI(LVOT): 16 ML/M^2
BH CV ECHO MEAS - SI(MOD-SP4): 8.6 ML/M^2
BH CV ECHO MEAS - SI(TEICH): 3.3 ML/M^2
BH CV ECHO MEAS - SV(AO): 75.1 ML
BH CV ECHO MEAS - SV(CUBED): 10.5 ML
BH CV ECHO MEAS - SV(LVOT): 37.1 ML
BH CV ECHO MEAS - SV(MOD-SP4): 19.8 ML
BH CV ECHO MEAS - SV(RVOT): 65.6 ML
BH CV ECHO MEAS - SV(TEICH): 7.5 ML
BH CV ECHO MEAS - TR MAX VEL: 326.1 CM/SEC
BILIRUB SERPL-MCNC: 0.6 MG/DL (ref 0–1.2)
BUN SERPL-MCNC: 37 MG/DL (ref 6–20)
BUN SERPL-MCNC: 40 MG/DL (ref 6–20)
BUN/CREAT SERPL: 20.6 (ref 7–25)
BUN/CREAT SERPL: 22.7 (ref 7–25)
CALCIUM SPEC-SCNC: 8.3 MG/DL (ref 8.6–10.5)
CALCIUM SPEC-SCNC: 8.6 MG/DL (ref 8.6–10.5)
CHLORIDE SERPL-SCNC: 101 MMOL/L (ref 98–107)
CHLORIDE SERPL-SCNC: 101 MMOL/L (ref 98–107)
CO2 SERPL-SCNC: 23 MMOL/L (ref 22–29)
CO2 SERPL-SCNC: 24 MMOL/L (ref 22–29)
CREAT SERPL-MCNC: 1.76 MG/DL (ref 0.76–1.27)
CREAT SERPL-MCNC: 1.8 MG/DL (ref 0.76–1.27)
DEPRECATED RDW RBC AUTO: 45.5 FL (ref 37–54)
DEPRECATED RDW RBC AUTO: 45.9 FL (ref 37–54)
EOSINOPHIL # BLD AUTO: 0 10*3/MM3 (ref 0–0.4)
EOSINOPHIL # BLD AUTO: 0 10*3/MM3 (ref 0–0.4)
EOSINOPHIL NFR BLD AUTO: 0.1 % (ref 0.3–6.2)
EOSINOPHIL NFR BLD AUTO: 0.2 % (ref 0.3–6.2)
ERYTHROCYTE [DISTWIDTH] IN BLOOD BY AUTOMATED COUNT: 14.4 % (ref 12.3–15.4)
ERYTHROCYTE [DISTWIDTH] IN BLOOD BY AUTOMATED COUNT: 14.5 % (ref 12.3–15.4)
GFR SERPL CREATININE-BSD FRML MDRD: 42 ML/MIN/1.73
GFR SERPL CREATININE-BSD FRML MDRD: 43 ML/MIN/1.73
GLOBULIN UR ELPH-MCNC: 3.2 GM/DL
GLUCOSE BLDC GLUCOMTR-MCNC: 147 MG/DL (ref 70–105)
GLUCOSE BLDC GLUCOMTR-MCNC: 165 MG/DL (ref 70–105)
GLUCOSE BLDC GLUCOMTR-MCNC: 169 MG/DL (ref 70–105)
GLUCOSE BLDC GLUCOMTR-MCNC: 239 MG/DL (ref 70–105)
GLUCOSE SERPL-MCNC: 136 MG/DL (ref 65–99)
GLUCOSE SERPL-MCNC: 168 MG/DL (ref 65–99)
HCT VFR BLD AUTO: 39.7 % (ref 37.5–51)
HCT VFR BLD AUTO: 40.1 % (ref 37.5–51)
HGB BLD-MCNC: 12.9 G/DL (ref 13–17.7)
HGB BLD-MCNC: 13.4 G/DL (ref 13–17.7)
LYMPHOCYTES # BLD AUTO: 1.7 10*3/MM3 (ref 0.7–3.1)
LYMPHOCYTES # BLD AUTO: 2.3 10*3/MM3 (ref 0.7–3.1)
LYMPHOCYTES NFR BLD AUTO: 14.1 % (ref 19.6–45.3)
LYMPHOCYTES NFR BLD AUTO: 19.4 % (ref 19.6–45.3)
MAGNESIUM SERPL-MCNC: 2.1 MG/DL (ref 1.6–2.6)
MCH RBC QN AUTO: 29.8 PG (ref 26.6–33)
MCH RBC QN AUTO: 30.5 PG (ref 26.6–33)
MCHC RBC AUTO-ENTMCNC: 32.5 G/DL (ref 31.5–35.7)
MCHC RBC AUTO-ENTMCNC: 33.4 G/DL (ref 31.5–35.7)
MCV RBC AUTO: 91.4 FL (ref 79–97)
MCV RBC AUTO: 91.7 FL (ref 79–97)
MONOCYTES # BLD AUTO: 1.4 10*3/MM3 (ref 0.1–0.9)
MONOCYTES # BLD AUTO: 1.5 10*3/MM3 (ref 0.1–0.9)
MONOCYTES NFR BLD AUTO: 11.6 % (ref 5–12)
MONOCYTES NFR BLD AUTO: 12.2 % (ref 5–12)
NEUTROPHILS NFR BLD AUTO: 68.1 % (ref 42.7–76)
NEUTROPHILS NFR BLD AUTO: 73 % (ref 42.7–76)
NEUTROPHILS NFR BLD AUTO: 8.1 10*3/MM3 (ref 1.7–7)
NEUTROPHILS NFR BLD AUTO: 9 10*3/MM3 (ref 1.7–7)
NRBC BLD AUTO-RTO: 0.1 /100 WBC (ref 0–0.2)
NRBC BLD AUTO-RTO: 0.1 /100 WBC (ref 0–0.2)
PHOSPHATE SERPL-MCNC: 3.4 MG/DL (ref 2.5–4.5)
PLATELET # BLD AUTO: 171 10*3/MM3 (ref 140–450)
PLATELET # BLD AUTO: 194 10*3/MM3 (ref 140–450)
PMV BLD AUTO: 9.8 FL (ref 6–12)
PMV BLD AUTO: 9.9 FL (ref 6–12)
POTASSIUM SERPL-SCNC: 4.9 MMOL/L (ref 3.5–5.2)
POTASSIUM SERPL-SCNC: 5 MMOL/L (ref 3.5–5.2)
PROT SERPL-MCNC: 6.8 G/DL (ref 6–8.5)
QT INTERVAL: 355 MS
RBC # BLD AUTO: 4.33 10*6/MM3 (ref 4.14–5.8)
RBC # BLD AUTO: 4.38 10*6/MM3 (ref 4.14–5.8)
SODIUM SERPL-SCNC: 134 MMOL/L (ref 136–145)
SODIUM SERPL-SCNC: 136 MMOL/L (ref 136–145)
TROPONIN T SERPL-MCNC: 4.24 NG/ML (ref 0–0.03)
TROPONIN T SERPL-MCNC: 4.46 NG/ML (ref 0–0.03)
TROPONIN T SERPL-MCNC: 5.03 NG/ML (ref 0–0.03)
URATE SERPL-MCNC: 8.8 MG/DL (ref 3.4–7)
WBC # BLD AUTO: 11.9 10*3/MM3 (ref 3.4–10.8)
WBC # BLD AUTO: 12.3 10*3/MM3 (ref 3.4–10.8)

## 2021-08-05 PROCEDURE — 84484 ASSAY OF TROPONIN QUANT: CPT | Performed by: NURSE PRACTITIONER

## 2021-08-05 PROCEDURE — 85025 COMPLETE CBC W/AUTO DIFF WBC: CPT | Performed by: INTERNAL MEDICINE

## 2021-08-05 PROCEDURE — 76775 US EXAM ABDO BACK WALL LIM: CPT

## 2021-08-05 PROCEDURE — 82962 GLUCOSE BLOOD TEST: CPT

## 2021-08-05 PROCEDURE — 99233 SBSQ HOSP IP/OBS HIGH 50: CPT | Performed by: INTERNAL MEDICINE

## 2021-08-05 PROCEDURE — 84100 ASSAY OF PHOSPHORUS: CPT | Performed by: INTERNAL MEDICINE

## 2021-08-05 PROCEDURE — 83735 ASSAY OF MAGNESIUM: CPT | Performed by: NURSE PRACTITIONER

## 2021-08-05 PROCEDURE — 85730 THROMBOPLASTIN TIME PARTIAL: CPT | Performed by: INTERNAL MEDICINE

## 2021-08-05 PROCEDURE — 80053 COMPREHEN METABOLIC PANEL: CPT | Performed by: NURSE PRACTITIONER

## 2021-08-05 PROCEDURE — 85025 COMPLETE CBC W/AUTO DIFF WBC: CPT | Performed by: EMERGENCY MEDICINE

## 2021-08-05 PROCEDURE — 84550 ASSAY OF BLOOD/URIC ACID: CPT | Performed by: INTERNAL MEDICINE

## 2021-08-05 PROCEDURE — 80048 BASIC METABOLIC PNL TOTAL CA: CPT | Performed by: INTERNAL MEDICINE

## 2021-08-05 PROCEDURE — 99221 1ST HOSP IP/OBS SF/LOW 40: CPT | Performed by: NURSE PRACTITIONER

## 2021-08-05 PROCEDURE — 63710000001 INSULIN LISPRO (HUMAN) PER 5 UNITS: Performed by: NURSE PRACTITIONER

## 2021-08-05 PROCEDURE — 36415 COLL VENOUS BLD VENIPUNCTURE: CPT | Performed by: INTERNAL MEDICINE

## 2021-08-05 PROCEDURE — 25010000002 HEPARIN (PORCINE) 25000-0.45 UT/250ML-% SOLUTION: Performed by: EMERGENCY MEDICINE

## 2021-08-05 RX ADMIN — HEPARIN SODIUM 14.26 UNITS/KG/HR: 10000 INJECTION, SOLUTION INTRAVENOUS at 00:12

## 2021-08-05 RX ADMIN — ASPIRIN 81 MG: 81 TABLET, COATED ORAL at 08:50

## 2021-08-05 RX ADMIN — Medication 1200 MG: at 21:14

## 2021-08-05 RX ADMIN — Medication 1200 MG: at 08:50

## 2021-08-05 RX ADMIN — HEPARIN SODIUM 17.56 UNITS/KG/HR: 10000 INJECTION, SOLUTION INTRAVENOUS at 14:53

## 2021-08-05 RX ADMIN — INSULIN LISPRO 3 UNITS: 100 INJECTION, SOLUTION INTRAVENOUS; SUBCUTANEOUS at 00:21

## 2021-08-05 RX ADMIN — INSULIN LISPRO 5 UNITS: 100 INJECTION, SOLUTION INTRAVENOUS; SUBCUTANEOUS at 08:49

## 2021-08-05 RX ADMIN — ATORVASTATIN CALCIUM 40 MG: 40 TABLET, FILM COATED ORAL at 21:14

## 2021-08-05 RX ADMIN — SODIUM CHLORIDE 100 ML/HR: 9 INJECTION, SOLUTION INTRAVENOUS at 08:20

## 2021-08-05 NOTE — PROGRESS NOTES
"Referring Provider: Hospitalist    Reason for follow-up: Shortness of breath, congestive heart failure, non-STEMI     Patient Care Team:  Mey Cox as PCP - General    Subjective .  Patient is feeling better with less shortness of breath but no chest pain    Objective  Lying in bed comfortably     Review of Systems   Constitutional: Negative for fever and malaise/fatigue.   HENT: Negative for ear pain and nosebleeds.    Eyes: Negative for blurred vision and double vision.   Cardiovascular: Negative for chest pain, dyspnea on exertion and palpitations.   Respiratory: Positive for shortness of breath. Negative for cough.    Skin: Negative for rash.   Musculoskeletal: Negative for joint pain.   Gastrointestinal: Negative for abdominal pain, nausea and vomiting.   Neurological: Negative for focal weakness and headaches.   Psychiatric/Behavioral: Negative for depression. The patient is not nervous/anxious.    All other systems reviewed and are negative.      Shellfish allergy    Scheduled Meds:Acetylcysteine, 1,200 mg, Oral, BID  aspirin, 81 mg, Oral, Daily  atorvastatin, 40 mg, Oral, Nightly  insulin lispro, 0-14 Units, Subcutaneous, Q6H      Continuous Infusions:heparin, 9.26 Units/kg/hr, Last Rate: 17.56 Units/kg/hr (08/05/21 1453)      PRN Meds:.•  acetaminophen **OR** acetaminophen  •  aluminum-magnesium hydroxide-simethicone  •  dextrose  •  dextrose  •  glucagon (human recombinant)  •  heparin  •  heparin  •  insulin lispro **AND** insulin lispro  •  nitroglycerin  •  ondansetron **OR** ondansetron        VITAL SIGNS  Vitals:    08/05/21 1300 08/05/21 1400 08/05/21 1500 08/05/21 1556   BP: 109/69 95/68 94/70 94/70   BP Location:       Patient Position:       Pulse: 104 103 102 105   Resp:    25   Temp:    98.5 °F (36.9 °C)   TempSrc:       SpO2: 100% 100% 100% 96%   Weight:       Height:           Flowsheet Rows      First Filed Value   Admission Height  182.9 cm (72\") Documented at 08/03/2021 1345 "   Admission Weight  108 kg (237 lb 7 oz) Documented at 08/03/2021 1345           TELEMETRY: Sinus rhythm nonspecific ST segment abnormality    Physical Exam:  Constitutional:       Appearance: Well-developed.   Eyes:      General: No scleral icterus.     Conjunctiva/sclera: Conjunctivae normal.      Pupils: Pupils are equal, round, and reactive to light.   HENT:      Head: Normocephalic and atraumatic.   Neck:      Vascular: No carotid bruit or JVD.   Pulmonary:      Effort: Pulmonary effort is normal.      Breath sounds: Normal breath sounds. No wheezing. No rales.   Cardiovascular:      Normal rate. Regular rhythm.      Murmurs: There is a systolic murmur.   Pulses:     Intact distal pulses.   Abdominal:      General: Bowel sounds are normal.      Palpations: Abdomen is soft.   Musculoskeletal: Normal range of motion.      Cervical back: Normal range of motion and neck supple. Skin:     General: Skin is warm and dry.      Findings: No rash.   Neurological:      Mental Status: Alert.      Comments: No focal deficits          Results Review:   I reviewed the patient's new clinical results.  Lab Results (last 24 hours)     Procedure Component Value Units Date/Time    POC Glucose Once [[id number]]  (Abnormal) Collected: 08/05/21 1529    Specimen: Blood Updated: 08/05/21 1531     Glucose 169 mg/dL      Comment: Serial Number: [id number]:  [id number]       Blood Culture - Blood, Arm, Right [[id number]] Collected: 08/03/21 1444    Specimen: Blood from Arm, Right Updated: 08/05/21 1500     Blood Culture No growth at 2 days    Blood Culture - Blood, Arm, Right [[id number]] Collected: 08/03/21 1425    Specimen: Blood from Arm, Right Updated: 08/05/21 1430     Blood Culture No growth at 2 days    POC Glucose Once [[id number]]  (Abnormal) Collected: 08/05/21 1139    Specimen: Blood Updated: 08/05/21 1141     Glucose 147 mg/dL      Comment: Serial Number: [id number]:  [id number]       Troponin [[id number]]  (Abnormal)  Collected: 08/05/21 1043    Specimen: Blood Updated: 08/05/21 1124     Troponin T 4.240 ng/mL     Narrative:      Troponin T Reference Range:  <= 0.03 ng/mL-   Negative for AMI  >0.03 ng/mL-     Abnormal for myocardial necrosis.  Clinicians would have to utilize clinical acumen, EKG, Troponin and serial changes to determine if it is an Acute Myocardial Infarction or myocardial injury due to an underlying chronic condition.       Results may be falsely decreased if patient taking Biotin.      aPTT [[id number]]  (Abnormal) Collected: 08/05/21 1043    Specimen: Blood Updated: 08/05/21 1109     PTT 49.6 seconds     POC Glucose Once [[id number]]  (Abnormal) Collected: 08/05/21 0746    Specimen: Blood Updated: 08/05/21 0747     Glucose 239 mg/dL      Comment: Serial Number: [id number]:  [id number]       Troponin [[id number]]  (Abnormal) Collected: 08/05/21 0450    Specimen: Blood Updated: 08/05/21 0540     Troponin T 4.460 ng/mL     Narrative:      Troponin T Reference Range:  <= 0.03 ng/mL-   Negative for AMI  >0.03 ng/mL-     Abnormal for myocardial necrosis.  Clinicians would have to utilize clinical acumen, EKG, Troponin and serial changes to determine if it is an Acute Myocardial Infarction or myocardial injury due to an underlying chronic condition.       Results may be falsely decreased if patient taking Biotin.      Comprehensive Metabolic Panel [[id number]]  (Abnormal) Collected: 08/05/21 0450    Specimen: Blood Updated: 08/05/21 0534     Glucose 168 mg/dL      BUN 37 mg/dL      Creatinine 1.80 mg/dL      Sodium 136 mmol/L      Potassium 5.0 mmol/L      Chloride 101 mmol/L      CO2 24.0 mmol/L      Calcium 8.3 mg/dL      Total Protein 6.8 g/dL      Albumin 3.60 g/dL      ALT (SGPT) 105 U/L      AST (SGOT) 151 U/L      Alkaline Phosphatase 100 U/L      Total Bilirubin 0.6 mg/dL      eGFR Non African Amer 42 mL/min/1.73      Globulin 3.2 gm/dL      A/G Ratio 1.1 g/dL      BUN/Creatinine Ratio 20.6     Anion Gap  11.0 mmol/L     Narrative:      GFR Normal >60  Chronic Kidney Disease <60  Kidney Failure <15      Uric Acid [[id number]]  (Abnormal) Collected: 08/05/21 0450    Specimen: Blood Updated: 08/05/21 0534     Uric Acid 8.8 mg/dL     Phosphorus [[id number]]  (Normal) Collected: 08/05/21 0450    Specimen: Blood Updated: 08/05/21 0534     Phosphorus 3.4 mg/dL     Magnesium [[id number]]  (Normal) Collected: 08/05/21 0450    Specimen: Blood Updated: 08/05/21 0534     Magnesium 2.1 mg/dL     aPTT [[id number]]  (Abnormal) Collected: 08/05/21 0450    Specimen: Blood Updated: 08/05/21 0523     PTT 46.7 seconds     CBC & Differential [[id number]]  (Abnormal) Collected: 08/05/21 0450    Specimen: Blood Updated: 08/05/21 0508    Narrative:      The following orders were created for panel order CBC & Differential.  Procedure                               Abnormality         Status                     ---------                               -----------         ------                     CBC Auto Differential[[id number]]        Abnormal            Final result                 Please view results for these tests on the individual orders.    CBC Auto Differential [[id number]]  (Abnormal) Collected: 08/05/21 0450    Specimen: Blood Updated: 08/05/21 0508     WBC 12.30 10*3/mm3      RBC 4.38 10*6/mm3      Hemoglobin 13.4 g/dL      Hematocrit 40.1 %      MCV 91.4 fL      MCH 30.5 pg      MCHC 33.4 g/dL      RDW 14.4 %      RDW-SD 45.9 fl      MPV 9.8 fL      Platelets 171 10*3/mm3      Neutrophil % 73.0 %      Lymphocyte % 14.1 %      Monocyte % 12.2 %      Eosinophil % 0.1 %      Basophil % 0.6 %      Neutrophils, Absolute 9.00 10*3/mm3      Lymphocytes, Absolute 1.70 10*3/mm3      Monocytes, Absolute 1.50 10*3/mm3      Eosinophils, Absolute 0.00 10*3/mm3      Basophils, Absolute 0.10 10*3/mm3      nRBC 0.1 /100 WBC     POC Glucose Once [[id number]]  (Abnormal) Collected: 08/05/21 0012    Specimen: Blood Updated: 08/05/21 0013     Glucose 165  mg/dL      Comment: Serial Number: [id number]:  [id number]       Troponin [[id number]]  (Abnormal) Collected: 08/04/21 2304    Specimen: Blood Updated: 08/04/21 2340     Troponin T 4.170 ng/mL     Narrative:      Troponin T Reference Range:  <= 0.03 ng/mL-   Negative for AMI  >0.03 ng/mL-     Abnormal for myocardial necrosis.  Clinicians would have to utilize clinical acumen, EKG, Troponin and serial changes to determine if it is an Acute Myocardial Infarction or myocardial injury due to an underlying chronic condition.       Results may be falsely decreased if patient taking Biotin.      aPTT [[id number]]  (Abnormal) Collected: 08/04/21 2304    Specimen: Blood Updated: 08/04/21 2329     PTT 45.5 seconds           Imaging Results (Last 24 Hours)     Procedure Component Value Units Date/Time    US Renal Bilateral [[id number]] Collected: 08/05/21 0526     Updated: 08/05/21 0527    Narrative:      RENAL ULTRASOUND        INDICATION: Renal failure.    COMPARISON: None.    TECHNIQUE: Gray scale images were obtained of the kidneys and bladder.    FINDINGS:  RIGHT KIDNEY:   Size: 11.0 cm in length.    Parenchymal thickness: Normal.   No hydronephrosis.    LEFT KIDNEY:   Size: 11.7 cm in length.     Parenchymal thickness: Normal.    No hydronephrosis.    BLADDER:   Partially fluid filled and unremarkable.    VASCULATURE:    Visualized aorta and retrohepatic IVC are normal.        Impression:      Normal renal ultrasound.    Electronically signed by:  Timbo Miguel M.D.    8/5/2021 3:26 AM    XR Chest 1 View [[id number]] Collected: 08/05/21 0002     Updated: 08/05/21 0005    Narrative:      EXAMINATION: Chest one view.    DATE: 8/4/2021.    COMPARISON: None available.    CLINICAL HISTORY: Chest pain.    FINDINGS:    The lungs and pleural spaces are clear.    The cardiomediastinal silhouette is mildly enlarged and the pulmonary vessels are within normal limits. There is a left-sided AICD generator overlying the right  ventricle. There are midline sternotomy wires.      Impression:        Mild cardiomegaly with no acute findings otherwise seen.    Electronically signed by:  Warren Luna D.O.    8/4/2021 10:04 PM          EKG      I personally viewed and interpreted the patient's EKG/Telemetry data:    ECHOCARDIOGRAM:    STRESS MYOVIEW:    CARDIAC CATHETERIZATION:    OTHER:         Assessment/Plan     Principal Problem:    NSTEMI (non-ST elevated myocardial infarction) (CMS/[facility])  Active Problems:    Cardiomyopathy, ischemic    Coronary artery disease    Heart failure (CMS/[facility])    Hyperlipidemia    Hypertensive disorder    Presence of automatic implantable cardioverter-defibrillator    Cardiogenic shock (CMS/[facility])    Severe sepsis, not felt to be septic shock    Type 2 diabetes mellitus with hyperglycemia (CMS/[facility])    SATHISH (acute kidney injury) (CMS/[facility])    H/O noncompliance with medical treatment, presenting hazards to health       Patient presented with chest pain or shortness of breath and had non-STEMI and was on heparin  Patient also has congestive heart failure and his echocardiogram showed a EF of about 10%  Patient has an ICD in the past and is working very well  Patient has acute renal failure and hence is followed by nephrologist  Patient will have cardiac catheterization once cleared by nephrologist  Discussed with patient about procedure risks and benefits  Patient also had severe mitral regurgitation and tricuspid regurgitation on the echocardiogram  Patient sugar levels and lipid levels are followed by the primary care doctor  We will start him on oral medicines including beta-blockers once his blood pressure is stable.    I discussed the patients findings and my recommendations with patient and nurse    Carl Appiah MD  08/05/21  16:05 EDT

## 2021-08-05 NOTE — PROGRESS NOTES
PULMONARY/CRITICAL CARE PROGRESS NOTE       NAME:     Yogi Tucker   AGE:     40 y.o.   SEX:  male   : 1980       MRN:       [id number]          RM: [facility]      ASSESSMENT & PLAN     F/U: NSTEMI    Principal Problem:    NSTEMI (non-ST elevated myocardial infarction) (CMS/[facility])  Active Problems:    Cardiomyopathy, ischemic    Coronary artery disease    Heart failure (CMS/[facility])    Hyperlipidemia    Hypertensive disorder    Presence of automatic implantable cardioverter-defibrillator    Cardiogenic shock (CMS/[facility])    Severe sepsis, not felt to be septic shock    Type 2 diabetes mellitus with hyperglycemia (CMS/[facility])    SATHISH (acute kidney injury) (CMS/[facility])    H/O noncompliance with medical treatment, presenting hazards to health     Multidisciplinary Rounds:  -Nephrology consulted for clearance and optimization for cardiac catheterization.  -Dopamine weaned.  -Planned cardiac cath per cardiology.  -Discontinue antibiotics, unlikely sepsis, monitor off antibiotics.    PLAN:  NSTEMI  History of coronary artery disease  -Cardiology consulted  -Cardiac cath once renal function has been optimized.  -Heparin gtt started  -Troponin 3.420, continue to monitor and trend  -EKG reviewed     Leukocytosis, unlikely severe sepsis  Cardiogenic shock  -IVF bolus 30 mL/kg per sepsis protocol in ED  -Continue IVFs  -Was started on dopamine, titrate to maintain MAP > 65 mm Hg, now discontinued  -Lactic Acid 1.9  -Blood cultures-pending  -UA-negative for UTI  -Procalcitonin-0.36, CRP-14.42  -Antibiotics discontinued, monitor off antibiotics     Ischemic cardiomyopathy  Systolic heart failure with reduced ejection fraction, not felt to be in exacerbation  Presence of automatic implantable cardioverter-defibrillator  -Last EF 35% on ECHO 2018  -proBNP 13,350  -ECHO pending  -Cardiology consulted and managing.     Type 2 diabetes mellitus, not controlled  -Patient noncompliant with medical management  -A1c 7.5  -Hold  home Metformin and glimepiride while in ICU  -Sliding scale insulin for coverage     Acute kidney injury, likely secondary to cardiogenic shock  -Creatinine 1.89 , baseline unknown  -IV fluids started  -Avoid nephrotoxic agents  -Avoid hypotension  -Consider nephrology consult     Elevated LFTs, likely secondary to cardiogenic shock  -ALT 44  -  -At risk for shocked liver  -Continue to monitor liver function     Hypertensive disorder  -Patient borderline hypotensive  -Continue home Coreg when clinically appropriate     Hyperlipidemia  -Continue home atorvastatin     History of noncompliance with medical treatment  -Stressed importance of medical compliance given extensive cardiac history     Code Status: FULL  VTE Prophylaxis: Heparin gtt  PUD Prophylaxis: Diet ordered/Not indicated      [employer] [facility] & SUBJECTIVE     Yogi Tucker is a 40 y.o. male  has a past medical history of Coronary artery disease, Hyperlipidemia, and Hypertension.   Patient presented to [facility] on 8/3/2021 with complaint of shortness of breath and tightness of chest.  Patient stated that he has had some muscle aching and feeling like his legs were slightly swollen.  Patient has had extensive but none productive cough.  Patient was swabbed for COVID-19 by his PCP which was negative.  Patient has been vaccinated against COVID-19.  Patient also states that last night he was moving around and had a syncopal episode where he stated he fell to the floor, passed out and woke up sometime later.  Patient reports he has not had much chest pain however just feels slightly tight with breathing and exertion.  Patient admits to not checking his blood sugar regularly.  Patient denies nausea, vomiting, or diarrhea.  Patient does endorse diaphoresis last night with some palpitations.  Of note patient has history of CABG x3 3/4/2012, in which he had similar symptoms.  Patient also has AICD placement 3/12/2012.  Patient is seen by Dr. Appiah, who  "was on call for cardiology this evening.  Called by ED physician for elevated troponin.  Patient was placed on heparin gtt. and will be seen in the morning for a possible cardiac catheterization.     In the ED, labs were obtained with abnormalities as follows: Troponin 3.420, proBNP 13,350, glucose 196, sodium 135, creatinine 1.89, BUN 25, ALT 44, , CRP 14.42, procalcitonin 0.36, PTT 28.8, WBC 15.20.  UA: > 1000 glucose, + protein.       Pulmonary/Intensivist service was contacted for admission to ICU and further evaluation and treatment.    8/5: Denies chest pain, shortness of breath, abdominal pain.    REVIEW OF SYSTEMS:  Pertinent items are noted in HPI, all other systems reviewed and negative    MEDICATIONS     SCHEDULED MEDICATIONS:   Acetylcysteine, 1,200 mg, Oral, BID  aspirin, 81 mg, Oral, Daily  atorvastatin, 40 mg, Oral, Nightly  insulin lispro, 0-14 Units, Subcutaneous, Q6H        CONTINUOUS INFUSIONS:   heparin, 9.26 Units/kg/hr, Last Rate: 17.26 Units/kg/hr (08/05/21 1851)        PRN MEDS:    acetaminophen **OR** acetaminophen    aluminum-magnesium hydroxide-simethicone    dextrose    dextrose    glucagon (human recombinant)    heparin    heparin    insulin lispro **AND** insulin lispro    nitroglycerin    ondansetron **OR** ondansetron    OBJECTIVE     VITAL SIGNS:  BP 99/77   Pulse 103   Temp 98.5 °F (36.9 °C)   Resp 25   Ht 182.9 cm (72\")   Wt 117 kg (257 lb 15 oz) Comment: standing  SpO2 99%   BMI 34.98 kg/m²     I/O FROM PREVIOUS 3 SHIFTS:  I/O last 3 completed shifts:  In: 2092 [P.O.:400; I.V.:1692]  Out: 1375 [Urine:1375]      I/O PREVIOUS 24 HOURS:   Intake/Output                               08/03/21 0701 - 08/04/21 0700 08/04/21 0701 - 08/05/21 0700 08/05/21 0701 - 08/06/21 0700     [id number] 8857-1426 Total 7285-7681 3817-3154 Total 7402-2033 7595-3101 Total                    Intake    P.O.  --  0 0  --  -- --  400  -- 400    I.V.  --  1938 1938  --  1692 1692  --  -- --    IV " Piggyback  --  100 100  --  -- --  --  -- --    Total Intake -- 2038 2038 -- 1692 1692 400 -- 400       Output    Urine  --  325 325  600  325 925  450  -- 450    Total Output -- 325 325 600 325 925 450 -- 450             NET BALANCE SINCE ADMISSION:  Net IO Since Admission: 2,430 mL [08/05/21 1942]     BOWEL MOVEMENTS:        PHYSICAL EXAM:  Constitutional:  Well developed, well nourished, no acute distress, non-toxic appearance   Eyes:  PERRL, conjunctiva normal, EOMI   HENT:  Atraumatic, external ears normal, nose normal, oropharynx moist, no pharyngeal exudates. Neck-normal range of motion, no tenderness  Respiratory: Expiratory diminished bilaterally without wheezes or rhonchi, non-labored respirations without accessory muscle use  Cardiovascular: Sinus tachycardia, + RUBA, no gallops, no rubs   GI:  Soft, nondistended, normal bowel sounds, nontender, no rebound or guarding   :  No costovertebral angle tenderness   Musculoskeletal:  No edema, no tenderness, no deformities  Integument:  Well hydrated, no rash   Neurologic:  Alert & oriented x 3, normal motor function, normal sensory function, no gross motor deficits noted   Psychiatric:  Speech and behavior appropriate      RESULTS     LABS:  Lab Results (last 24 hours)       Procedure Component Value Units Date/Time    aPTT [[id number]]  (Abnormal) Collected: 08/05/21 1830    Specimen: Blood Updated: 08/05/21 1846     PTT 46.1 seconds     Troponin [[id number]]  (Abnormal) Collected: 08/05/21 1725    Specimen: Blood Updated: 08/05/21 1814     Troponin T 5.030 ng/mL     Narrative:      Troponin T Reference Range:  <= 0.03 ng/mL-   Negative for AMI  >0.03 ng/mL-     Abnormal for myocardial necrosis.  Clinicians would have to utilize clinical acumen, EKG, Troponin and serial changes to determine if it is an Acute Myocardial Infarction or myocardial injury due to an underlying chronic condition.       Results may be falsely decreased if patient taking Biotin.       Basic Metabolic Panel [[id number]]  (Abnormal) Collected: 08/05/21 1725    Specimen: Blood Updated: 08/05/21 1807     Glucose 136 mg/dL      BUN 40 mg/dL      Creatinine 1.76 mg/dL      Sodium 134 mmol/L      Potassium 4.9 mmol/L      Chloride 101 mmol/L      CO2 23.0 mmol/L      Calcium 8.6 mg/dL      eGFR Non African Amer 43 mL/min/1.73      BUN/Creatinine Ratio 22.7     Anion Gap 10.0 mmol/L     Narrative:      GFR Normal >60  Chronic Kidney Disease <60  Kidney Failure <15      CBC & Differential [[id number]]  (Abnormal) Collected: 08/05/21 1725    Specimen: Blood Updated: 08/05/21 1744    Narrative:      The following orders were created for panel order CBC & Differential.  Procedure                               Abnormality         Status                     ---------                               -----------         ------                     CBC Auto Differential[[id number]]        Abnormal            Final result                 Please view results for these tests on the individual orders.    CBC Auto Differential [[id number]]  (Abnormal) Collected: 08/05/21 1725    Specimen: Blood Updated: 08/05/21 1744     WBC 11.90 10*3/mm3      RBC 4.33 10*6/mm3      Hemoglobin 12.9 g/dL      Hematocrit 39.7 %      MCV 91.7 fL      MCH 29.8 pg      MCHC 32.5 g/dL      RDW 14.5 %      RDW-SD 45.5 fl      MPV 9.9 fL      Platelets 194 10*3/mm3      Neutrophil % 68.1 %      Lymphocyte % 19.4 %      Monocyte % 11.6 %      Eosinophil % 0.2 %      Basophil % 0.7 %      Neutrophils, Absolute 8.10 10*3/mm3      Lymphocytes, Absolute 2.30 10*3/mm3      Monocytes, Absolute 1.40 10*3/mm3      Eosinophils, Absolute 0.00 10*3/mm3      Basophils, Absolute 0.10 10*3/mm3      nRBC 0.1 /100 WBC     POC Glucose Once [[id number]]  (Abnormal) Collected: 08/05/21 1529    Specimen: Blood Updated: 08/05/21 1531     Glucose 169 mg/dL      Comment: Serial Number: [id number]:  [id number]       Blood Culture - Blood, Arm, Right [[id number]]  Collected: 08/03/21 1444    Specimen: Blood from Arm, Right Updated: 08/05/21 1500     Blood Culture No growth at 2 days    Blood Culture - Blood, Arm, Right [[id number]] Collected: 08/03/21 1425    Specimen: Blood from Arm, Right Updated: 08/05/21 1430     Blood Culture No growth at 2 days    POC Glucose Once [[id number]]  (Abnormal) Collected: 08/05/21 1139    Specimen: Blood Updated: 08/05/21 1141     Glucose 147 mg/dL      Comment: Serial Number: [id number]:  [id number]       Troponin [[id number]]  (Abnormal) Collected: 08/05/21 1043    Specimen: Blood Updated: 08/05/21 1124     Troponin T 4.240 ng/mL     Narrative:      Troponin T Reference Range:  <= 0.03 ng/mL-   Negative for AMI  >0.03 ng/mL-     Abnormal for myocardial necrosis.  Clinicians would have to utilize clinical acumen, EKG, Troponin and serial changes to determine if it is an Acute Myocardial Infarction or myocardial injury due to an underlying chronic condition.       Results may be falsely decreased if patient taking Biotin.      aPTT [[id number]]  (Abnormal) Collected: 08/05/21 1043    Specimen: Blood Updated: 08/05/21 1109     PTT 49.6 seconds     POC Glucose Once [[id number]]  (Abnormal) Collected: 08/05/21 0746    Specimen: Blood Updated: 08/05/21 0747     Glucose 239 mg/dL      Comment: Serial Number: [id number]:  [id number]       Troponin [[id number]]  (Abnormal) Collected: 08/05/21 0450    Specimen: Blood Updated: 08/05/21 0540     Troponin T 4.460 ng/mL     Narrative:      Troponin T Reference Range:  <= 0.03 ng/mL-   Negative for AMI  >0.03 ng/mL-     Abnormal for myocardial necrosis.  Clinicians would have to utilize clinical acumen, EKG, Troponin and serial changes to determine if it is an Acute Myocardial Infarction or myocardial injury due to an underlying chronic condition.       Results may be falsely decreased if patient taking Biotin.      Comprehensive Metabolic Panel [[id number]]  (Abnormal) Collected: 08/05/21 0450     Specimen: Blood Updated: 08/05/21 0534     Glucose 168 mg/dL      BUN 37 mg/dL      Creatinine 1.80 mg/dL      Sodium 136 mmol/L      Potassium 5.0 mmol/L      Chloride 101 mmol/L      CO2 24.0 mmol/L      Calcium 8.3 mg/dL      Total Protein 6.8 g/dL      Albumin 3.60 g/dL      ALT (SGPT) 105 U/L      AST (SGOT) 151 U/L      Alkaline Phosphatase 100 U/L      Total Bilirubin 0.6 mg/dL      eGFR Non African Amer 42 mL/min/1.73      Globulin 3.2 gm/dL      A/G Ratio 1.1 g/dL      BUN/Creatinine Ratio 20.6     Anion Gap 11.0 mmol/L     Narrative:      GFR Normal >60  Chronic Kidney Disease <60  Kidney Failure <15      Uric Acid [[id number]]  (Abnormal) Collected: 08/05/21 0450    Specimen: Blood Updated: 08/05/21 0534     Uric Acid 8.8 mg/dL     Phosphorus [[id number]]  (Normal) Collected: 08/05/21 0450    Specimen: Blood Updated: 08/05/21 0534     Phosphorus 3.4 mg/dL     Magnesium [[id number]]  (Normal) Collected: 08/05/21 0450    Specimen: Blood Updated: 08/05/21 0534     Magnesium 2.1 mg/dL     aPTT [[id number]]  (Abnormal) Collected: 08/05/21 0450    Specimen: Blood Updated: 08/05/21 0523     PTT 46.7 seconds     CBC & Differential [[id number]]  (Abnormal) Collected: 08/05/21 0450    Specimen: Blood Updated: 08/05/21 0508    Narrative:      The following orders were created for panel order CBC & Differential.  Procedure                               Abnormality         Status                     ---------                               -----------         ------                     CBC Auto Differential[[id number]]        Abnormal            Final result                 Please view results for these tests on the individual orders.    CBC Auto Differential [[id number]]  (Abnormal) Collected: 08/05/21 0450    Specimen: Blood Updated: 08/05/21 0508     WBC 12.30 10*3/mm3      RBC 4.38 10*6/mm3      Hemoglobin 13.4 g/dL      Hematocrit 40.1 %      MCV 91.4 fL      MCH 30.5 pg      MCHC 33.4 g/dL      RDW 14.4 %      RDW-SD  45.9 fl      MPV 9.8 fL      Platelets 171 10*3/mm3      Neutrophil % 73.0 %      Lymphocyte % 14.1 %      Monocyte % 12.2 %      Eosinophil % 0.1 %      Basophil % 0.6 %      Neutrophils, Absolute 9.00 10*3/mm3      Lymphocytes, Absolute 1.70 10*3/mm3      Monocytes, Absolute 1.50 10*3/mm3      Eosinophils, Absolute 0.00 10*3/mm3      Basophils, Absolute 0.10 10*3/mm3      nRBC 0.1 /100 WBC     POC Glucose Once [[id number]]  (Abnormal) Collected: 08/05/21 0012    Specimen: Blood Updated: 08/05/21 0013     Glucose 165 mg/dL      Comment: Serial Number: [id number]:  [id number]                RADIOLOGY:  XR Chest 1 View    Result Date: 8/5/2021  EXAMINATION: Chest one view. DATE: 8/4/2021. COMPARISON: None available. CLINICAL HISTORY: Chest pain. FINDINGS: The lungs and pleural spaces are clear. The cardiomediastinal silhouette is mildly enlarged and the pulmonary vessels are within normal limits. There is a left-sided AICD generator overlying the right ventricle. There are midline sternotomy wires.     Mild cardiomegaly with no acute findings otherwise seen. Electronically signed by:  Warren uLna D.O.  8/4/2021 10:04 PM    US Renal Bilateral    Result Date: 8/5/2021  RENAL ULTRASOUND    INDICATION: Renal failure. COMPARISON: None. TECHNIQUE: Gray scale images were obtained of the kidneys and bladder. FINDINGS: RIGHT KIDNEY: Size: 11.0 cm in length.  Parenchymal thickness: Normal. No hydronephrosis. LEFT KIDNEY: Size: 11.7 cm in length.   Parenchymal thickness: Normal.  No hydronephrosis. BLADDER: Partially fluid filled and unremarkable. VASCULATURE:  Visualized aorta and retrohepatic IVC are normal.      Normal renal ultrasound. Electronically signed by:  Timbo Miguel M.D.  8/5/2021 3:26 AM      ECHOCARDIOGRAM:  Results for orders placed during the hospital encounter of 08/03/21    Adult Transthoracic Echo Complete w/ Color, Spectral and Contrast if Necessary Per Protocol    Interpretation Summary  · Left  ventricular ejection fraction appears to be less than 20%.  · The right ventricular cavity is mildly dilated.  · Severe mitral valve regurgitation is present.  · Moderate tricuspid valve regurgitation is present.  · No pericardial effusion noted  Pending    I reviewed the patient's new clinical results.    This note has been scribed by me for pulmonary attending physician.    Late Entry  Electronically signed by WHIT Covarrubias, 08/04/2021, 10:00 AM    I personally have examined  and interviewed the patient. I have reviewed the history, data, problems, assessment and plan with our NP.  Critical care time in direct medical management (   ) minutes  Electronically signed by Alissa Vargas MD D,ABSM, 08/07/21, 12:57 AM EDT.

## 2021-08-05 NOTE — PROGRESS NOTES
"PULMONARY CRITICAL CARE Progress  NOTE      PATIENT IDENTIFICATION:  Name: Yogi Tucker  MRN: [id number]  :  1980     Age: 40 y.o.  Sex: male    DATE OF Note:  2021   Referring Physician: Alissa Vargas MD                  Subjective:   Feeling better, still on 5 L O2  no SOB no chest pain,   +nausea / vomiting,   no change in bowel habit,   Good urine out put ,  no new  skin rash or itching.      Objective:  tMax 24 hrs: Temp (24hrs), Av.2 °F (36.8 °C), Min:97.6 °F (36.4 °C), Max:99 °F (37.2 °C)      Vitals Ranges:   Temp:  [97.6 °F (36.4 °C)-99 °F (37.2 °C)] 97.7 °F (36.5 °C)  Heart Rate:  [] 101  Resp:  [25-27] 25  BP: ()/(61-80) 99/71    Intake and Output Last 3 Shifts:   I/O last 3 completed shifts:  In: 3730 [I.V.:3630; IV Piggyback:100]  Out: 1250 [Urine:1250]    Exam:  BP 99/71   Pulse 101   Temp 97.7 °F (36.5 °C) (Axillary)   Resp 25   Ht 182.9 cm (72\")   Wt 117 kg (257 lb 15 oz) Comment: standing  SpO2 100%   BMI 34.98 kg/m²     General Appearance:   AA  HEENT:  Normocephalic, without obvious abnormality, Conjunctiva/corneas clear,.  Normal external ear canals, Nares normal, no drainage     Neck:  Supple, symmetrical, trachea midline. No JVD.  Lungs /Chest wall:   Bilateral basal rhonchi, respirations unlabored symmetrical wall movement.     Heart:  Regular rate and rhythm, systolic murmur PMI left sternal border  Abdomen: Soft, non-tender, no masses, no organomegaly.    Extremities: Trace edema no clubbing or Cyanosis        Medications:    Current Facility-Administered Medications:   •  acetaminophen (TYLENOL) tablet 650 mg, 650 mg, Oral, Q4H PRN **OR** acetaminophen (TYLENOL) suppository 650 mg, 650 mg, Rectal, Q4H PRN, Micha Ferreira APRN  •  Acetylcysteine capsule 1,200 mg, 1,200 mg, Oral, BID, Karen Espinosa MD, 1,200 mg at 21 0850  •  aluminum-magnesium hydroxide-simethicone (MAALOX MAX) 400-400-40 MG/5ML suspension 15 mL, 15 mL, Oral, Q6H " PRN, Micha Ferreira, APRN  •  aspirin EC tablet 81 mg, 81 mg, Oral, Daily, Micha Ferreira, APRN, 81 mg at 08/05/21 0850  •  atorvastatin (LIPITOR) tablet 40 mg, 40 mg, Oral, Nightly, Micha Ferreira, APRN, 40 mg at 08/04/21 2211  •  dextrose (D50W) 25 g/ 50mL Intravenous Solution 25 g, 25 g, Intravenous, Q15 Min PRN, Micha Ferreira, APRN  •  dextrose (GLUTOSE) oral gel 15 g, 15 g, Oral, Q15 Min PRN, Micha Ferreira, APRN  •  glucagon (human recombinant) (GLUCAGEN DIAGNOSTIC) injection 1 mg, 1 mg, Subcutaneous, Q15 Min PRN, Micha Ferreira, APRN  •  heparin 43250 units/250 mL (100 units/mL) in 0.45 % NaCl infusion, 9.26 Units/kg/hr, Intravenous, Titrated, Shan Sheriff MD, Last Rate: 17.56 mL/hr at 08/05/21 1131, 16.26 Units/kg/hr at 08/05/21 1131  •  heparin bolus from bag 2,500 Units, 2,500 Units, Intravenous, Q6H PRN, Shan Sheriff MD, 2,500 Units at 08/05/21 1131  •  heparin bolus from bag 5,000 Units, 5,000 Units, Intravenous, Q6H PRN, Shan Sheriff MD, 5,000 Units at 08/04/21 0642  •  insulin lispro (ADMELOG) injection 0-14 Units, 0-14 Units, Subcutaneous, Q6H, 5 Units at 08/05/21 0849 **AND** insulin lispro (ADMELOG) injection 0-14 Units, 0-14 Units, Subcutaneous, PRN, Micha Ferreira, APRN  •  nitroglycerin (NITROSTAT) SL tablet 0.4 mg, 0.4 mg, Sublingual, Q5 Min PRN, Micha Ferreira, APRN  •  ondansetron (ZOFRAN) tablet 4 mg, 4 mg, Oral, Q6H PRN **OR** ondansetron (ZOFRAN) injection 4 mg, 4 mg, Intravenous, Q6H PRN, Micha Ferreira, APRN, 4 mg at 08/04/21 2231  •  sodium chloride 0.9 % infusion, 100 mL/hr, Intravenous, Continuous, Karen Espinosa MD, Last Rate: 100 mL/hr at 08/05/21 0820, 100 mL/hr at 08/05/21 0820    Data Review:  All labs (24hrs):   Recent Results (from the past 24 hour(s))   Troponin    Collection Time: 08/04/21 12:25 PM    Specimen: Blood   Result Value Ref Range    Troponin T 3.570 (C) 0.000 - 0.030 ng/mL   Basic Metabolic Panel    Collection Time: 08/04/21 12:25  PM    Specimen: Blood   Result Value Ref Range    Glucose 137 (H) 65 - 99 mg/dL    BUN 31 (H) 6 - 20 mg/dL    Creatinine 1.79 (H) 0.76 - 1.27 mg/dL    Sodium 137 136 - 145 mmol/L    Potassium 5.0 3.5 - 5.2 mmol/L    Chloride 101 98 - 107 mmol/L    CO2 23.0 22.0 - 29.0 mmol/L    Calcium 8.6 8.6 - 10.5 mg/dL    eGFR Non African Amer 42 (L) >60 mL/min/1.73    BUN/Creatinine Ratio 17.3 7.0 - 25.0    Anion Gap 13.0 5.0 - 15.0 mmol/L   aPTT    Collection Time: 08/04/21  2:35 PM    Specimen: Blood   Result Value Ref Range    PTT 40.8 (L) 61.0 - 76.5 seconds   POC Glucose Once    Collection Time: 08/04/21  4:01 PM    Specimen: Blood   Result Value Ref Range    Glucose 174 (H) 70 - 105 mg/dL   Adult Transthoracic Echo Complete w/ Color, Spectral and Contrast if Necessary Per Protocol    Collection Time: 08/04/21  4:48 PM   Result Value Ref Range    BSA 2.3 m^2    RVIDd 2.9 cm    IVSd 1.2 cm    LVIDd 5.2 cm    LVIDs 5.1 cm    LVPWd 1.5 cm    IVS/LVPW 0.76     FS 2.6 %    EDV(Teich) 129.7 ml    ESV(Teich) 122.1 ml    EF(Teich) 5.8 %    EDV(cubed) 140.8 ml    ESV(cubed) 130.3 ml    EF(cubed) 7.5 %    LV mass(C)d 291.5 grams    LV mass(C)dI 125.9 grams/m^2    SV(Teich) 7.5 ml    SI(Teich) 3.3 ml/m^2    SV(cubed) 10.5 ml    SI(cubed) 4.5 ml/m^2    Ao root diam 3.0 cm    Ao root area 7.1 cm^2    ACS 2.1 cm    asc Aorta Diam 3.1 cm    LVOT diam 2.2 cm    LVOT area 3.7 cm^2    RVOT diam 3.4 cm    RVOT area 9.0 cm^2    EDV(MOD-sp4) 187.5 ml    ESV(MOD-sp4) 167.7 ml    EF(MOD-sp4) 10.6 %    SV(MOD-sp4) 19.8 ml    SI(MOD-sp4) 8.6 ml/m^2    Ao root area (BSA corrected) 1.3     LV Evans Vol (BSA corrected) 81.0 ml/m^2    LV Sys Vol (BSA corrected) 72.4 ml/m^2    Aortic R-R 0.53 sec    Aortic .2 BPM    MV E max kwasi 111.8 cm/sec    MV A max kwasi 66.1 cm/sec    MV E/A 1.7     MV V2 max 122.0 cm/sec    MV max PG 6.0 mmHg    MV V2 mean 80.3 cm/sec    MV mean PG 2.9 mmHg    MV V2 VTI 19.9 cm    MVA(VTI) 1.9 cm^2    MV dec slope 833.8  cm/sec^2    MV dec time 0.13 sec    Ao pk kristofer 94.0 cm/sec    Ao max PG 3.5 mmHg    Ao max PG (full) 1.5 mmHg    Ao V2 mean 61.8 cm/sec    Ao mean PG 1.9 mmHg    Ao mean PG (full) 0.65 mmHg    Ao V2 VTI 10.6 cm    MAG(I,A) 3.5 cm^2    MAG(I,D) 3.5 cm^2    MAG(V,A) 2.8 cm^2    MAG(V,D) 2.8 cm^2    LV V1 max PG 2.0 mmHg    LV V1 mean PG 1.2 mmHg    LV V1 max 70.8 cm/sec    LV V1 mean 51.0 cm/sec    LV V1 VTI 9.9 cm    MR max kristofer 425.1 cm/sec    MR max PG 72.3 mmHg    CO(Ao) 8.6 l/min    CI(Ao) 3.7 l/min/m^2    SV(Ao) 75.1 ml    SI(Ao) 32.5 ml/m^2    CO(LVOT) 4.2 l/min    CI(LVOT) 1.8 l/min/m^2    SV(LVOT) 37.1 ml    SV(RVOT) 65.6 ml    SI(LVOT) 16.0 ml/m^2    PA V2 max 95.1 cm/sec    PA max PG 3.6 mmHg    PA max PG (full) 2.4 mmHg    PA V2 mean 68.2 cm/sec    PA mean PG 2.1 mmHg    PA mean PG (full) 1.6 mmHg    PA V2 VTI 11.9 cm    PVA(I,A) 5.5 cm^2    BH CV ECHO MARY - PVA(I,D) 5.5 cm^2    BH CV ECHO MARY - PVA(V,A) 5.1 cm^2    BH CV ECHO MARY - PVA(V,D) 5.1 cm^2    PA acc time 0.09 sec    RV V1 max PG 1.2 mmHg    RV V1 mean PG 0.53 mmHg    RV V1 max 54.2 cm/sec    RV V1 mean 33.6 cm/sec    RV V1 VTI 7.3 cm    TR max kristofer 326.1 cm/sec    RVSP(TR) 45.5 mmHg    RAP systole 3.0 mmHg    PA pr(Accel) 37.9 mmHg    Pulm Sys Kristofer 28.3 cm/sec    Pulm Evans Kristofer 49.5 cm/sec    Pulm S/D 0.57     Qp/Qs 1.8      CV ECHO MARY - BZI_BMI 33.0 kilograms/m^2     CV ECHO MARY - BSA(HAYCOCK) 2.4 m^2     CV ECHO MARY - BZI_METRIC_WEIGHT 110.2 kg     CV ECHO MARY - BZI_METRIC_HEIGHT 182.9 cm    EF - Contrast (4Ch) 11.0 cm2   Troponin    Collection Time: 08/04/21  6:17 PM    Specimen: Blood   Result Value Ref Range    Troponin T 3.910 (C) 0.000 - 0.030 ng/mL   Troponin    Collection Time: 08/04/21 11:04 PM    Specimen: Blood   Result Value Ref Range    Troponin T 4.170 (C) 0.000 - 0.030 ng/mL   aPTT    Collection Time: 08/04/21 11:04 PM    Specimen: Blood   Result Value Ref Range    PTT 45.5 (L) 61.0 - 76.5 seconds   POC Glucose Once     Collection Time: 08/05/21 12:12 AM    Specimen: Blood   Result Value Ref Range    Glucose 165 (H) 70 - 105 mg/dL   CBC Auto Differential    Collection Time: 08/05/21  4:50 AM    Specimen: Blood   Result Value Ref Range    WBC 12.30 (H) 3.40 - 10.80 10*3/mm3    RBC 4.38 4.14 - 5.80 10*6/mm3    Hemoglobin 13.4 13.0 - 17.7 g/dL    Hematocrit 40.1 37.5 - 51.0 %    MCV 91.4 79.0 - 97.0 fL    MCH 30.5 26.6 - 33.0 pg    MCHC 33.4 31.5 - 35.7 g/dL    RDW 14.4 12.3 - 15.4 %    RDW-SD 45.9 37.0 - 54.0 fl    MPV 9.8 6.0 - 12.0 fL    Platelets 171 140 - 450 10*3/mm3    Neutrophil % 73.0 42.7 - 76.0 %    Lymphocyte % 14.1 (L) 19.6 - 45.3 %    Monocyte % 12.2 (H) 5.0 - 12.0 %    Eosinophil % 0.1 (L) 0.3 - 6.2 %    Basophil % 0.6 0.0 - 1.5 %    Neutrophils, Absolute 9.00 (H) 1.70 - 7.00 10*3/mm3    Lymphocytes, Absolute 1.70 0.70 - 3.10 10*3/mm3    Monocytes, Absolute 1.50 (H) 0.10 - 0.90 10*3/mm3    Eosinophils, Absolute 0.00 0.00 - 0.40 10*3/mm3    Basophils, Absolute 0.10 0.00 - 0.20 10*3/mm3    nRBC 0.1 0.0 - 0.2 /100 WBC   Troponin    Collection Time: 08/05/21  4:50 AM    Specimen: Blood   Result Value Ref Range    Troponin T 4.460 (C) 0.000 - 0.030 ng/mL   Magnesium    Collection Time: 08/05/21  4:50 AM    Specimen: Blood   Result Value Ref Range    Magnesium 2.1 1.6 - 2.6 mg/dL   Comprehensive Metabolic Panel    Collection Time: 08/05/21  4:50 AM    Specimen: Blood   Result Value Ref Range    Glucose 168 (H) 65 - 99 mg/dL    BUN 37 (H) 6 - 20 mg/dL    Creatinine 1.80 (H) 0.76 - 1.27 mg/dL    Sodium 136 136 - 145 mmol/L    Potassium 5.0 3.5 - 5.2 mmol/L    Chloride 101 98 - 107 mmol/L    CO2 24.0 22.0 - 29.0 mmol/L    Calcium 8.3 (L) 8.6 - 10.5 mg/dL    Total Protein 6.8 6.0 - 8.5 g/dL    Albumin 3.60 3.50 - 5.20 g/dL    ALT (SGPT) 105 (H) 1 - 41 U/L    AST (SGOT) 151 (H) 1 - 40 U/L    Alkaline Phosphatase 100 39 - 117 U/L    Total Bilirubin 0.6 0.0 - 1.2 mg/dL    eGFR Non African Amer 42 (L) >60 mL/min/1.73    Globulin 3.2  gm/dL    A/G Ratio 1.1 g/dL    BUN/Creatinine Ratio 20.6 7.0 - 25.0    Anion Gap 11.0 5.0 - 15.0 mmol/L   Uric Acid    Collection Time: 08/05/21  4:50 AM    Specimen: Blood   Result Value Ref Range    Uric Acid 8.8 (H) 3.4 - 7.0 mg/dL   Phosphorus    Collection Time: 08/05/21  4:50 AM    Specimen: Blood   Result Value Ref Range    Phosphorus 3.4 2.5 - 4.5 mg/dL   aPTT    Collection Time: 08/05/21  4:50 AM    Specimen: Blood   Result Value Ref Range    PTT 46.7 (L) 61.0 - 76.5 seconds   POC Glucose Once    Collection Time: 08/05/21  7:46 AM    Specimen: Blood   Result Value Ref Range    Glucose 239 (H) 70 - 105 mg/dL   Troponin    Collection Time: 08/05/21 10:43 AM    Specimen: Blood   Result Value Ref Range    Troponin T 4.240 (C) 0.000 - 0.030 ng/mL   aPTT    Collection Time: 08/05/21 10:43 AM    Specimen: Blood   Result Value Ref Range    PTT 49.6 (L) 61.0 - 76.5 seconds   POC Glucose Once    Collection Time: 08/05/21 11:39 AM    Specimen: Blood   Result Value Ref Range    Glucose 147 (H) 70 - 105 mg/dL        Imaging:  US Renal Bilateral  Narrative: RENAL ULTRASOUND        INDICATION: Renal failure.    COMPARISON: None.    TECHNIQUE: Gray scale images were obtained of the kidneys and bladder.    FINDINGS:  RIGHT KIDNEY:   Size: 11.0 cm in length.    Parenchymal thickness: Normal.   No hydronephrosis.    LEFT KIDNEY:   Size: 11.7 cm in length.     Parenchymal thickness: Normal.    No hydronephrosis.    BLADDER:   Partially fluid filled and unremarkable.    VASCULATURE:    Visualized aorta and retrohepatic IVC are normal.    Impression: Normal renal ultrasound.    Electronically signed by:  Timbo Miguel M.D.    8/5/2021 3:26 AM  XR Chest 1 View  Narrative: EXAMINATION: Chest one view.    DATE: 8/4/2021.    COMPARISON: None available.    CLINICAL HISTORY: Chest pain.    FINDINGS:    The lungs and pleural spaces are clear.    The cardiomediastinal silhouette is mildly enlarged and the pulmonary vessels are within  normal limits. There is a left-sided AICD generator overlying the right ventricle. There are midline sternotomy wires.  Impression: Mild cardiomegaly with no acute findings otherwise seen.    Electronically signed by:  Warren Luna D.O.    8/4/2021 10:04 PM       ASSESSMENT:    NSTEMI (non-ST elevated myocardial infarction) (CMS/[facility]ly [facility])    Cardiomyopathy, ischemic    Coronary artery disease    Heart failure (CMS/[facility])  DEEPA    Hyperlipidemia    Hypertensive disorder    Presence of automatic implantable cardioverter-defibrillator    Cardiogenic shock (CMS/[facility])    Severe sepsis, not felt to be septic shock    Type 2 diabetes mellitus with hyperglycemia (CMS/[facility])    SATHISH (acute kidney injury) (CMS/[facility])    H/O noncompliance with medical treatment, presenting hazards to health     PLAN:  Dc IV fluids   Watch INOs  O2 support  Consider BIPAP  Bronchodilator  Inhaled corticosteroids  Electrolytes/ glycemic control  DVT and GI prophylaxis.    Total Critical care time in direct medical management (   ) minutes  Alissa Vargas MD. D, ABSM.     8/5/2021  12:20 EDT

## 2021-08-05 NOTE — PROGRESS NOTES
PULMONARY/CRITICAL CARE PROGRESS NOTE       NAME:     Yogi Tucker   AGE:     40 y.o.   SEX:  male   : 1980       MRN:       [id number]          RM: [facility]      ASSESSMENT & PLAN     F/U: NSTEMI    Principal Problem:    NSTEMI (non-ST elevated myocardial infarction) (CMS/[facility])  Active Problems:    Cardiomyopathy, ischemic    Coronary artery disease    Heart failure (CMS/[facility])    Hyperlipidemia    Hypertensive disorder    Presence of automatic implantable cardioverter-defibrillator    Cardiogenic shock (CMS/[facility])    Severe sepsis, not felt to be septic shock    Type 2 diabetes mellitus with hyperglycemia (CMS/[facility])    SATHISH (acute kidney injury) (CMS/[facility])    H/O noncompliance with medical treatment, presenting hazards to health       Multidisciplinary Rounds:  -Nephrology consulted for clearance and optimization for cardiac catheterization.  -Dopamine weaned.  -Planned cardiac cath per cardiology.  -Discontinue antibiotics, unlikely sepsis, monitor off antibiotics.    PLAN:  NSTEMI  History of coronary artery disease  -Cardiology consulted  -Cardiac cath once renal function has been optimized.  -Heparin gtt started  -Troponin 3.420  -Trend until normalized  -EKG reviewed     Leukocytosis, unlikely severe sepsis  Cardiogenic shock  -IVF bolus 30 mL/kg per sepsis protocol in ED  -Continue IVFs  -Was started on dopamine, titrate to maintain MAP > 65 mm Hg, now discontinued  -Lactic Acid 1.9  -Blood cultures-pending  -UA-negative for UTI  -Procalcitonin-0.36, CRP-14.42  -Antibiotics discontinued, monitor off antibiotics     Ischemic cardiomyopathy  Systolic heart failure with reduced ejection fraction, not felt to be in exacerbation  Presence of automatic implantable cardioverter-defibrillator  -Last EF 35% on ECHO 2018  -proBNP 13,350  -ECHO pending  -Cardiology consulted and managing.     Type 2 diabetes mellitus, not controlled  -Patient noncompliant with medical management  -A1c 7.5  -Hold  home Metformin and glimepiride while in ICU  -Sliding scale insulin for coverage     Acute kidney injury, likely secondary to cardiogenic shock  -Creatinine 1.89 , baseline unknown  -IV fluids started  -Avoid nephrotoxic agents  -Avoid hypotension  -Consider nephrology consult     Elevated LFTs, likely secondary to cardiogenic shock  -ALT 44  -  -At risk for shocked liver  -Continue to monitor liver function     Hypertensive disorder  -Patient borderline hypotensive  -Continue home Coreg when clinically appropriate     Hyperlipidemia  -Continue home atorvastatin     History of noncompliance with medical treatment  -Stressed importance of medical compliance given extensive cardiac history     Code Status: FULL  VTE Prophylaxis: Heparin gtt  PUD Prophylaxis: Diet ordered/Not indicated      Ute & SUBJECTIVE     Yogi Tucker is a 40 y.o. male  has a past medical history of Coronary artery disease, Hyperlipidemia, and Hypertension.   Patient presented to [facility] on 8/3/2021 with complaint of shortness of breath and tightness of chest.  Patient stated that he has had some muscle aching and feeling like his legs were slightly swollen.  Patient has had extensive but none productive cough.  Patient was swabbed for COVID-19 by his PCP which was negative.  Patient has been vaccinated against COVID-19.  Patient also states that last night he was moving around and had a syncopal episode where he stated he fell to the floor, passed out and woke up sometime later.  Patient reports he has not had much chest pain however just feels slightly tight with breathing and exertion.  Patient admits to not checking his blood sugar regularly.  Patient denies nausea, vomiting, or diarrhea.  Patient does endorse diaphoresis last night with some palpitations.  Of note patient has history of CABG x3 3/4/2012, in which he had similar symptoms.  Patient also has AICD placement 3/12/2012.  Patient is seen by Dr. Appiah, who  "was on call for cardiology this evening.  Called by ED physician for elevated troponin.  Patient was placed on heparin gtt. and will be seen in the morning for a possible cardiac catheterization.     In the ED, labs were obtained with abnormalities as follows: Troponin 3.420, proBNP 13,350, glucose 196, sodium 135, creatinine 1.89, BUN 25, ALT 44, , CRP 14.42, procalcitonin 0.36, PTT 28.8, WBC 15.20.  UA: > 1000 glucose, + protein.       Pulmonary/Intensivist service was contacted for admission to ICU and further evaluation and treatment.    8/5: Denies chest pain, shortness of breath, abdominal pain.    REVIEW OF SYSTEMS:  Pertinent items are noted in HPI, all other systems reviewed and negative    MEDICATIONS     SCHEDULED MEDICATIONS:   Acetylcysteine, 1,200 mg, Oral, BID  aspirin, 81 mg, Oral, Daily  atorvastatin, 40 mg, Oral, Nightly  insulin lispro, 0-14 Units, Subcutaneous, Q6H        CONTINUOUS INFUSIONS:   heparin, 9.26 Units/kg/hr, Last Rate: 14.26 Units/kg/hr (08/05/21 0012)  sodium chloride, 75 mL/hr, Last Rate: 75 mL/hr (08/04/21 1808)        PRN MEDS:    acetaminophen **OR** acetaminophen    aluminum-magnesium hydroxide-simethicone    dextrose    dextrose    glucagon (human recombinant)    heparin    heparin    insulin lispro **AND** insulin lispro    nitroglycerin    ondansetron **OR** ondansetron    OBJECTIVE     VITAL SIGNS:  /61   Pulse 113   Temp 98.3 °F (36.8 °C) (Oral)   Resp 27   Ht 182.9 cm (72\")   Wt 110 kg (243 lb)   SpO2 92%   BMI 32.96 kg/m²     I/O FROM PREVIOUS 3 SHIFTS:  I/O last 3 completed shifts:  In: 2038 [I.V.:1938; IV Piggyback:100]  Out: 925 [Urine:925]      I/O PREVIOUS 24 HOURS:   Intake/Output                         08/03/21 0701 - 08/04/21 0700 08/04/21 0701 - 08/05/21 0700     0599-5683 4568-2630 Total 2401-9311 8824-2791 Total                 Intake    P.O.  --  0 0  --  -- --    I.V.  --  1938 1938  --  1000 1000    IV Piggyback  --  100 100  --  -- -- "    Total Intake -- 2038 2038 -- 1000 1000       Output    Urine  --  325 325  600  -- 600    Total Output -- 325 325 600 -- 600             NET BALANCE SINCE ADMISSION:  Net IO Since Admission: 2,113 mL [08/05/21 0042]     BOWEL MOVEMENTS:          PHYSICAL EXAM:  Constitutional:  Well developed, well nourished, no acute distress, non-toxic appearance   Eyes:  PERRL, conjunctiva normal, EOMI   HENT:  Atraumatic, external ears normal, nose normal, oropharynx moist, no pharyngeal exudates. Neck-normal range of motion, no tenderness  Respiratory: Expiratory diminished bilaterally, non-labored respirations without accessory muscle use  Cardiovascular: Sinus tachycardia, + murmurs, no gallops, no rubs   GI:  Soft, nondistended, normal bowel sounds, nontender, no rebound or guarding   :  No costovertebral angle tenderness   Musculoskeletal:  No edema, no tenderness, no deformities  Integument:  Well hydrated, no rash   Neurologic:  Alert & oriented x 3, normal motor function, normal sensory function, no gross motor deficits noted   Psychiatric:  Speech and behavior appropriate      RESULTS     LABS:  Lab Results (last 24 hours)       Procedure Component Value Units Date/Time    POC Glucose Once [[id number]]  (Abnormal) Collected: 08/05/21 0012    Specimen: Blood Updated: 08/05/21 0013     Glucose 165 mg/dL      Comment: Serial Number: [id number]:  [id number]       Troponin [[id number]]  (Abnormal) Collected: 08/04/21 2304    Specimen: Blood Updated: 08/04/21 2340     Troponin T 4.170 ng/mL     Narrative:      Troponin T Reference Range:  <= 0.03 ng/mL-   Negative for AMI  >0.03 ng/mL-     Abnormal for myocardial necrosis.  Clinicians would have to utilize clinical acumen, EKG, Troponin and serial changes to determine if it is an Acute Myocardial Infarction or myocardial injury due to an underlying chronic condition.       Results may be falsely decreased if patient taking Biotin.      aPTT [[id number]]  (Abnormal)  Collected: 08/04/21 2304    Specimen: Blood Updated: 08/04/21 2329     PTT 45.5 seconds     Troponin [[id number]]  (Abnormal) Collected: 08/04/21 1817    Specimen: Blood Updated: 08/04/21 1858     Troponin T 3.910 ng/mL     Narrative:      Troponin T Reference Range:  <= 0.03 ng/mL-   Negative for AMI  >0.03 ng/mL-     Abnormal for myocardial necrosis.  Clinicians would have to utilize clinical acumen, EKG, Troponin and serial changes to determine if it is an Acute Myocardial Infarction or myocardial injury due to an underlying chronic condition.       Results may be falsely decreased if patient taking Biotin.      POC Glucose Once [[id number]]  (Abnormal) Collected: 08/04/21 1601    Specimen: Blood Updated: 08/04/21 1601     Glucose 174 mg/dL      Comment: Serial Number: [id number]:  [id number]       aPTT [[id number]]  (Abnormal) Collected: 08/04/21 1435    Specimen: Blood Updated: 08/04/21 1503     PTT 40.8 seconds     Blood Culture - Blood, Arm, Right [[id number]] Collected: 08/03/21 1444    Specimen: Blood from Arm, Right Updated: 08/04/21 1500     Blood Culture No growth at 24 hours    Blood Culture - Blood, Arm, Right [[id number]] Collected: 08/03/21 1425    Specimen: Blood from Arm, Right Updated: 08/04/21 1431     Blood Culture No growth at 24 hours    Troponin [[id number]]  (Abnormal) Collected: 08/04/21 1225    Specimen: Blood Updated: 08/04/21 1301     Troponin T 3.570 ng/mL     Narrative:      Troponin T Reference Range:  <= 0.03 ng/mL-   Negative for AMI  >0.03 ng/mL-     Abnormal for myocardial necrosis.  Clinicians would have to utilize clinical acumen, EKG, Troponin and serial changes to determine if it is an Acute Myocardial Infarction or myocardial injury due to an underlying chronic condition.       Results may be falsely decreased if patient taking Biotin.      Basic Metabolic Panel [[id number]]  (Abnormal) Collected: 08/04/21 1225    Specimen: Blood Updated: 08/04/21 1257     Glucose 137 mg/dL       BUN 31 mg/dL      Creatinine 1.79 mg/dL      Sodium 137 mmol/L      Potassium 5.0 mmol/L      Chloride 101 mmol/L      CO2 23.0 mmol/L      Calcium 8.6 mg/dL      eGFR Non African Amer 42 mL/min/1.73      BUN/Creatinine Ratio 17.3     Anion Gap 13.0 mmol/L     Narrative:      GFR Normal >60  Chronic Kidney Disease <60  Kidney Failure <15      POC Glucose Once [[id number]]  (Abnormal) Collected: 08/04/21 1151    Specimen: Blood Updated: 08/04/21 1152     Glucose 134 mg/dL      Comment: Serial Number: [id number]:  [id number]       Hemoglobin A1c [[id number]]  (Abnormal) Collected: 08/03/21 1849    Specimen: Blood Updated: 08/04/21 1042     Hemoglobin A1C 7.5 %     Narrative:      Hemoglobin A1C Reference Range:    <5.7 %        Normal  5.7-6.4 %     Increased risk for diabetes  > 6.4 %        Diabetes       These guidelines have been recommended by the American Diabetic Association for Hgb A1c.      The following 2010 guidelines have been recommended by the American Diabetes Association for Hemoglobin A1c.    HBA1c 5.7-6.4% Increased risk for future diabetes (pre-diabetes)  HBA1c     >6.4% Diabetes      POC Glucose Once [[id number]]  (Abnormal) Collected: 08/04/21 0739    Specimen: Blood Updated: 08/04/21 0740     Glucose 142 mg/dL      Comment: Serial Number: [id number]:  [id number]       POC Glucose Once [[id number]]  (Abnormal) Collected: 08/04/21 0640    Specimen: Blood Updated: 08/04/21 0641     Glucose 159 mg/dL      Comment: Serial Number: [id number]:  [id number]       Troponin [[id number]]  (Abnormal) Collected: 08/04/21 0502    Specimen: Blood Updated: 08/04/21 0602     Troponin T 3.400 ng/mL     Narrative:      Troponin T Reference Range:  <= 0.03 ng/mL-   Negative for AMI  >0.03 ng/mL-     Abnormal for myocardial necrosis.  Clinicians would have to utilize clinical acumen, EKG, Troponin and serial changes to determine if it is an Acute Myocardial Infarction or myocardial injury due to an  underlying chronic condition.       Results may be falsely decreased if patient taking Biotin.      Comprehensive Metabolic Panel [[id number]]  (Abnormal) Collected: 08/04/21 0502    Specimen: Blood Updated: 08/04/21 0558     Glucose 152 mg/dL      BUN 29 mg/dL      Creatinine 1.72 mg/dL      Sodium 137 mmol/L      Potassium 4.4 mmol/L      Chloride 101 mmol/L      CO2 25.0 mmol/L      Calcium 8.2 mg/dL      Total Protein 6.5 g/dL      Albumin 3.60 g/dL      ALT (SGPT) 46 U/L      AST (SGOT) 110 U/L      Alkaline Phosphatase 96 U/L      Total Bilirubin 0.7 mg/dL      eGFR Non African Amer 44 mL/min/1.73      Globulin 2.9 gm/dL      A/G Ratio 1.2 g/dL      BUN/Creatinine Ratio 16.9     Anion Gap 11.0 mmol/L     Narrative:      GFR Normal >60  Chronic Kidney Disease <60  Kidney Failure <15      Lipid Panel [[id number]] Collected: 08/04/21 0502    Specimen: Blood Updated: 08/04/21 0558     Total Cholesterol 130 mg/dL      Triglycerides 133 mg/dL      HDL Cholesterol 51 mg/dL      LDL Cholesterol  56 mg/dL      VLDL Cholesterol 23 mg/dL      LDL/HDL Ratio 1.03    Narrative:      Cholesterol Reference Ranges  (U.S. Department of Health and Human Services ATP III Classifications)    Desirable          <200 mg/dL  Borderline High    200-239 mg/dL  High Risk          >240 mg/dL      Triglyceride Reference Ranges  (U.S. Department of Health and Human Services ATP III Classifications)    Normal           <150 mg/dL  Borderline High  150-199 mg/dL  High             200-499 mg/dL  Very High        >500 mg/dL    HDL Reference Ranges  (U.S. Department of Health and Human Services ATP III Classifcations)    Low     <40 mg/dl (major risk factor for CHD)  High    >60 mg/dl ('negative' risk factor for CHD)        LDL Reference Ranges  (U.S. Department of Health and Human Services ATP III Classifcations)    Optimal          <100 mg/dL  Near Optimal     100-129 mg/dL  Borderline High  130-159 mg/dL  High             160-189 mg/dL  Very  High        >189 mg/dL    Phosphorus [[id number]]  (Normal) Collected: 08/04/21 0502    Specimen: Blood Updated: 08/04/21 0558     Phosphorus 2.8 mg/dL     Magnesium [[id number]]  (Normal) Collected: 08/04/21 0502    Specimen: Blood Updated: 08/04/21 0558     Magnesium 2.0 mg/dL     aPTT [[id number]]  (Abnormal) Collected: 08/04/21 0502    Specimen: Blood Updated: 08/04/21 0537     PTT 36.5 seconds     CBC & Differential [[id number]]  (Abnormal) Collected: 08/04/21 0502    Specimen: Blood Updated: 08/04/21 0515    Narrative:      The following orders were created for panel order CBC & Differential.  Procedure                               Abnormality         Status                     ---------                               -----------         ------                     CBC Auto Differential[[id number]]        Abnormal            Final result                 Please view results for these tests on the individual orders.    CBC Auto Differential [[id number]]  (Abnormal) Collected: 08/04/21 0502    Specimen: Blood Updated: 08/04/21 0515     WBC 11.60 10*3/mm3      RBC 4.41 10*6/mm3      Hemoglobin 13.2 g/dL      Hematocrit 40.0 %      MCV 90.6 fL      MCH 29.8 pg      MCHC 32.9 g/dL      RDW 14.2 %      RDW-SD 44.6 fl      MPV 9.6 fL      Platelets 154 10*3/mm3      Neutrophil % 74.2 %      Lymphocyte % 14.9 %      Monocyte % 10.4 %      Eosinophil % 0.2 %      Basophil % 0.3 %      Neutrophils, Absolute 8.60 10*3/mm3      Lymphocytes, Absolute 1.70 10*3/mm3      Monocytes, Absolute 1.20 10*3/mm3      Eosinophils, Absolute 0.00 10*3/mm3      Basophils, Absolute 0.00 10*3/mm3      nRBC 0.1 /100 WBC              RADIOLOGY:  No Radiology Exams Resulted Within Past 24 Hours    ECHOCARDIOGRAM:   Pending    I reviewed the patient's new clinical results.    This note has been scribed by me for pulmonary attending physician.    Late Entry  Electronically signed by WHIT Covarrubias, 08/04/2021, 10:00 AM    I personally have  examined  and interviewed the patient. I have reviewed the history, data, problems, assessment and plan with our NP.  Critical care time in direct medical management (   32) minutes  Electronically signed by Alissa Vargas MD, D,ABS, 08/05/21, 7:34 AM EDT.

## 2021-08-05 NOTE — PLAN OF CARE
Goal Outcome Evaluation:              Outcome Summary: Pt had some N/V overnight after nine o'clock med pass. Zofran helped relieve symptoms. Pt currently on 4L O2 NC, heparin gtt infusing, heart cath pending renal function. Vss, will continue to monitor.

## 2021-08-05 NOTE — PROGRESS NOTES
"   LOS: 2 days    Patient Care Team:  Mey Cox as PCP - General    Chief Complaint:    Chief Complaint   Patient presents with   • Shortness of Breath     Soa, coughing, fever,  chest pain/tightness, pt is  pending covid test from PMD.  pt is vaccinated     Follow UP SATHISH  Subjective     Interval History:   SOA last night, IVF stopped.  No cp or soa at present.  Eating ok.  Review of Systems:   As noted above    Objective     Vital Signs  Temp:  [97.6 °F (36.4 °C)-99 °F (37.2 °C)] 97.6 °F (36.4 °C)  Heart Rate:  [] 97  Resp:  [21-27] 27  BP: ()/(61-80) 101/73    Flowsheet Rows      First Filed Value   Admission Height  182.9 cm (72\") Documented at 08/03/2021 1345   Admission Weight  108 kg (237 lb 7 oz) Documented at 08/03/2021 1345          I/O this shift:  In: 1692 [I.V.:1692]  Out: 325 [Urine:325]  I/O last 3 completed shifts:  In: 2038 [I.V.:1938; IV Piggyback:100]  Out: 925 [Urine:925]    Intake/Output Summary (Last 24 hours) at 8/5/2021 0626  Last data filed at 8/5/2021 0532  Gross per 24 hour   Intake 1692 ml   Output 925 ml   Net 767 ml       Physical Exam:  General Appearance: alert, oriented x 3, no acute distress,   Skin: warm and dry  HEENT: pupils round and reactive to light, oral mucosa normal, nonicteric sclera  Neck: supple, no JVD, trachea midline  Lungs: CTA, unlabored breathing effort  Heart: RRR, normal S1 and S2, no S3, no rub  Abdomen: soft, nontender, normoactive bowels  : no palpable bladder,  Extremities: no edema, cyanosis or clubbing  Neuro: normal speech and mental status       Results Review:    Results from last 7 days   Lab Units 08/05/21  0450 08/04/21  1225 08/04/21  0502 08/03/21  1425 08/03/21  1425   SODIUM mmol/L 136 137 137   < > 135*   POTASSIUM mmol/L 5.0 5.0 4.4   < > 4.8   CHLORIDE mmol/L 101 101 101   < > 98   CO2 mmol/L 24.0 23.0 25.0   < > 26.0   BUN mg/dL 37* 31* 29*   < > 25*   CREATININE mg/dL 1.80* 1.79* 1.72*   < > 1.89*   CALCIUM mg/dL 8.3* 8.6 " 8.2*   < > 8.6   BILIRUBIN mg/dL 0.6  --  0.7  --  0.6   ALK PHOS U/L 100  --  96  --  95   ALT (SGPT) U/L 105*  --  46*  --  44*   AST (SGOT) U/L 151*  --  110*  --  126*   GLUCOSE mg/dL 168* 137* 152*   < > 196*    < > = values in this interval not displayed.       Estimated Creatinine Clearance: 71.3 mL/min (A) (by C-G formula based on SCr of 1.8 mg/dL (H)).    Results from last 7 days   Lab Units 08/05/21  0450 08/04/21  0502 08/03/21  1425   MAGNESIUM mg/dL 2.1 2.0 2.0   PHOSPHORUS mg/dL 3.4 2.8 3.5       Results from last 7 days   Lab Units 08/05/21  0450   URIC ACID mg/dL 8.8*       Results from last 7 days   Lab Units 08/05/21  0450 08/04/21  0502 08/03/21  1849 08/03/21  1425   WBC 10*3/mm3 12.30* 11.60* 14.50* 15.20*   HEMOGLOBIN g/dL 13.4 13.2 13.8 14.3   PLATELETS 10*3/mm3 171 154 181 211       Results from last 7 days   Lab Units 08/03/21  1849   INR  1.01         Imaging Results (Last 24 Hours)     Procedure Component Value Units Date/Time    US Renal Bilateral [[id number]] Collected: 08/05/21 0526     Updated: 08/05/21 0527    Narrative:      RENAL ULTRASOUND        INDICATION: Renal failure.    COMPARISON: None.    TECHNIQUE: Gray scale images were obtained of the kidneys and bladder.    FINDINGS:  RIGHT KIDNEY:   Size: 11.0 cm in length.    Parenchymal thickness: Normal.   No hydronephrosis.    LEFT KIDNEY:   Size: 11.7 cm in length.     Parenchymal thickness: Normal.    No hydronephrosis.    BLADDER:   Partially fluid filled and unremarkable.    VASCULATURE:    Visualized aorta and retrohepatic IVC are normal.        Impression:      Normal renal ultrasound.    Electronically signed by:  Timbo Miguel M.D.    8/5/2021 3:26 AM    XR Chest 1 View [[id number]] Collected: 08/05/21 0002     Updated: 08/05/21 0005    Narrative:      EXAMINATION: Chest one view.    DATE: 8/4/2021.    COMPARISON: None available.    CLINICAL HISTORY: Chest pain.    FINDINGS:    The lungs and pleural spaces are clear.    The  cardiomediastinal silhouette is mildly enlarged and the pulmonary vessels are within normal limits. There is a left-sided AICD generator overlying the right ventricle. There are midline sternotomy wires.      Impression:        Mild cardiomegaly with no acute findings otherwise seen.    Electronically signed by:  Warren Luna D.O.    8/4/2021 10:04 PM        Acetylcysteine, 1,200 mg, Oral, BID  aspirin, 81 mg, Oral, Daily  atorvastatin, 40 mg, Oral, Nightly  insulin lispro, 0-14 Units, Subcutaneous, Q6H      heparin, 9.26 Units/kg/hr, Last Rate: 15.26 Units/kg/hr (08/05/21 0550)  sodium chloride, 100 mL/hr, Last Rate: 75 mL/hr (08/04/21 1808)        Medication Review:   Current Facility-Administered Medications   Medication Dose Route Frequency Provider Last Rate Last Admin   • acetaminophen (TYLENOL) tablet 650 mg  650 mg Oral Q4H PRN Micha Ferreira APRN        Or   • acetaminophen (TYLENOL) suppository 650 mg  650 mg Rectal Q4H PRN Micha Ferreira APRN       • Acetylcysteine capsule 1,200 mg  1,200 mg Oral BID Karen Espinosa MD   1,200 mg at 08/04/21 2211   • aluminum-magnesium hydroxide-simethicone (MAALOX MAX) 400-400-40 MG/5ML suspension 15 mL  15 mL Oral Q6H PRN Micha Ferreira APRN       • aspirin EC tablet 81 mg  81 mg Oral Daily Micha Ferreira APRN   81 mg at 08/04/21 0938   • atorvastatin (LIPITOR) tablet 40 mg  40 mg Oral Nightly Micha Ferreira APRN   40 mg at 08/04/21 2211   • dextrose (D50W) 25 g/ 50mL Intravenous Solution 25 g  25 g Intravenous Q15 Min PRN Micha Ferreira APRN       • dextrose (GLUTOSE) oral gel 15 g  15 g Oral Q15 Min PRN Micha Ferreira APRN       • glucagon (human recombinant) (GLUCAGEN DIAGNOSTIC) injection 1 mg  1 mg Subcutaneous Q15 Min PRN Micha Ferreira APRN       • heparin 30791 units/250 mL (100 units/mL) in 0.45 % NaCl infusion  9.26 Units/kg/hr Intravenous Titrated Shan Sheriff MD 16.48 mL/hr at 08/05/21 0550 15.26 Units/kg/hr at 08/05/21 0550    • heparin bolus from bag 2,500 Units  2,500 Units Intravenous Q6H PRN Shan Sheriff MD   2,500 Units at 08/05/21 0550   • heparin bolus from bag 5,000 Units  5,000 Units Intravenous Q6H PRN Shan Sheriff MD   5,000 Units at 08/04/21 0642   • insulin lispro (ADMELOG) injection 0-14 Units  0-14 Units Subcutaneous Q6H Micha Ferreira, APRN   3 Units at 08/05/21 0021    And   • insulin lispro (ADMELOG) injection 0-14 Units  0-14 Units Subcutaneous PRN Micha Ferreira E, APRN       • nitroglycerin (NITROSTAT) SL tablet 0.4 mg  0.4 mg Sublingual Q5 Min PRN Micha Ferreira APRN       • ondansetron (ZOFRAN) tablet 4 mg  4 mg Oral Q6H PRN Micha Ferreira, APRN        Or   • ondansetron (ZOFRAN) injection 4 mg  4 mg Intravenous Q6H PRN Micha Ferreira, APRN   4 mg at 08/04/21 2231   • sodium chloride 0.9 % infusion  100 mL/hr Intravenous Continuous Karen Espinosa MD 75 mL/hr at 08/04/21 1808 75 mL/hr at 08/04/21 1808       Assessment/Plan         NSTEMI (non-ST elevated myocardial infarction) (CMS/[facility])    Cardiomyopathy, ischemic    Coronary artery disease    Heart failure (CMS/[facility])    Hyperlipidemia    Hypertensive disorder    Presence of automatic implantable cardioverter-defibrillator    Cardiogenic shock (CMS/[facility])    Severe sepsis, not felt to be septic shock    Type 2 diabetes mellitus with hyperglycemia (CMS/[facility])    SATHISH (acute kidney injury) (CMS/HCC)    H/O noncompliance with medical treatment, presenting hazards to health       1.  Renal insufficiency: Reduced perfusion in the setting of nsaid use?  Some central volume overload.  Avoiding precath IVF.  Continue mucomysyt.  Creatinine 1.8, stable.  Ua with small proteinuria no blood.  Renal ultrasound pending.  2.  Chest pain in the setting ischemic cardiomyopathy  3.  History of CABG  4.  Hyperlipidemia     Plan:  Explained to patient the risks and benefits of cardiac catheterization in terms of kidney function.    Would be ok to proceed with  cardiac catheterization if creatinine remains around 1.8.      Karen Espinosa MD  08/05/21  06:26 EDT

## 2021-08-05 NOTE — NURSING NOTE
Patient troponin 5.030.  Dr Hughes notified with no new orders received.  Patient has denied chest pain and shortness of breath.  O2 down to 2 liters per nasal cannula.  Patient informed of NPO status after midnight for heart cath tomorrow.  Heparin gtt infusing with appropriate rate changes made per ptt value.

## 2021-08-06 LAB
ACT BLD: 98 SECONDS (ref 89–137)
ALBUMIN SERPL-MCNC: 3 G/DL (ref 3.5–5.2)
ALBUMIN/GLOB SERPL: 0.9 G/DL
ALP SERPL-CCNC: 105 U/L (ref 39–117)
ALT SERPL W P-5'-P-CCNC: 113 U/L (ref 1–41)
ANION GAP SERPL CALCULATED.3IONS-SCNC: 11 MMOL/L (ref 5–15)
APTT PPP: 29.6 SECONDS (ref 61–76.5)
APTT PPP: 45.8 SECONDS (ref 61–76.5)
APTT PPP: 49.5 SECONDS (ref 61–76.5)
APTT PPP: 50.2 SECONDS (ref 61–76.5)
APTT PPP: 52.6 SECONDS (ref 61–76.5)
AST SERPL-CCNC: 96 U/L (ref 1–40)
BASOPHILS # BLD AUTO: 0.1 10*3/MM3 (ref 0–0.2)
BASOPHILS NFR BLD AUTO: 0.5 % (ref 0–1.5)
BILIRUB SERPL-MCNC: 0.5 MG/DL (ref 0–1.2)
BUN SERPL-MCNC: 39 MG/DL (ref 6–20)
BUN/CREAT SERPL: 21.8 (ref 7–25)
CALCIUM SPEC-SCNC: 8.4 MG/DL (ref 8.6–10.5)
CHLORIDE SERPL-SCNC: 99 MMOL/L (ref 98–107)
CO2 SERPL-SCNC: 21 MMOL/L (ref 22–29)
CREAT SERPL-MCNC: 1.79 MG/DL (ref 0.76–1.27)
DEPRECATED RDW RBC AUTO: 45.9 FL (ref 37–54)
EOSINOPHIL # BLD AUTO: 0 10*3/MM3 (ref 0–0.4)
EOSINOPHIL NFR BLD AUTO: 0.2 % (ref 0.3–6.2)
ERYTHROCYTE [DISTWIDTH] IN BLOOD BY AUTOMATED COUNT: 14.3 % (ref 12.3–15.4)
GFR SERPL CREATININE-BSD FRML MDRD: 42 ML/MIN/1.73
GLOBULIN UR ELPH-MCNC: 3.5 GM/DL
GLUCOSE BLDC GLUCOMTR-MCNC: 139 MG/DL (ref 70–105)
GLUCOSE BLDC GLUCOMTR-MCNC: 144 MG/DL (ref 70–105)
GLUCOSE BLDC GLUCOMTR-MCNC: 153 MG/DL (ref 70–105)
GLUCOSE BLDC GLUCOMTR-MCNC: 166 MG/DL (ref 70–105)
GLUCOSE BLDC GLUCOMTR-MCNC: 258 MG/DL (ref 70–105)
GLUCOSE SERPL-MCNC: 190 MG/DL (ref 65–99)
HCT VFR BLD AUTO: 39.8 % (ref 37.5–51)
HGB BLD-MCNC: 13.1 G/DL (ref 13–17.7)
LYMPHOCYTES # BLD AUTO: 2 10*3/MM3 (ref 0.7–3.1)
LYMPHOCYTES NFR BLD AUTO: 18.9 % (ref 19.6–45.3)
MAGNESIUM SERPL-MCNC: 2.3 MG/DL (ref 1.6–2.6)
MCH RBC QN AUTO: 29.9 PG (ref 26.6–33)
MCHC RBC AUTO-ENTMCNC: 32.9 G/DL (ref 31.5–35.7)
MCV RBC AUTO: 90.9 FL (ref 79–97)
MONOCYTES # BLD AUTO: 1.2 10*3/MM3 (ref 0.1–0.9)
MONOCYTES NFR BLD AUTO: 11.7 % (ref 5–12)
NEUTROPHILS NFR BLD AUTO: 68.7 % (ref 42.7–76)
NEUTROPHILS NFR BLD AUTO: 7.3 10*3/MM3 (ref 1.7–7)
NRBC BLD AUTO-RTO: 0.1 /100 WBC (ref 0–0.2)
PHOSPHATE SERPL-MCNC: 3.1 MG/DL (ref 2.5–4.5)
PLATELET # BLD AUTO: 173 10*3/MM3 (ref 140–450)
PMV BLD AUTO: 9.8 FL (ref 6–12)
POTASSIUM SERPL-SCNC: 4.9 MMOL/L (ref 3.5–5.2)
PROT SERPL-MCNC: 6.5 G/DL (ref 6–8.5)
RBC # BLD AUTO: 4.38 10*6/MM3 (ref 4.14–5.8)
SODIUM SERPL-SCNC: 131 MMOL/L (ref 136–145)
TROPONIN T SERPL-MCNC: 3.28 NG/ML (ref 0–0.03)
TROPONIN T SERPL-MCNC: 4.07 NG/ML (ref 0–0.03)
TROPONIN T SERPL-MCNC: 4.3 NG/ML (ref 0–0.03)
TROPONIN T SERPL-MCNC: 4.69 NG/ML (ref 0–0.03)
WBC # BLD AUTO: 10.6 10*3/MM3 (ref 3.4–10.8)

## 2021-08-06 PROCEDURE — 85025 COMPLETE CBC W/AUTO DIFF WBC: CPT | Performed by: NURSE PRACTITIONER

## 2021-08-06 PROCEDURE — B2131ZZ FLUOROSCOPY OF MULTIPLE CORONARY ARTERY BYPASS GRAFTS USING LOW OSMOLAR CONTRAST: ICD-10-PCS | Performed by: INTERNAL MEDICINE

## 2021-08-06 PROCEDURE — 84484 ASSAY OF TROPONIN QUANT: CPT | Performed by: NURSE PRACTITIONER

## 2021-08-06 PROCEDURE — 63710000001 INSULIN LISPRO (HUMAN) PER 5 UNITS: Performed by: NURSE PRACTITIONER

## 2021-08-06 PROCEDURE — 25010000002 HEPARIN (PORCINE) 25000-0.45 UT/250ML-% SOLUTION: Performed by: EMERGENCY MEDICINE

## 2021-08-06 PROCEDURE — 93455 CORONARY ART/GRFT ANGIO S&I: CPT | Performed by: INTERNAL MEDICINE

## 2021-08-06 PROCEDURE — 0 IOPAMIDOL PER 1 ML: Performed by: INTERNAL MEDICINE

## 2021-08-06 PROCEDURE — 85730 THROMBOPLASTIN TIME PARTIAL: CPT | Performed by: HOSPITALIST

## 2021-08-06 PROCEDURE — 99152 MOD SED SAME PHYS/QHP 5/>YRS: CPT | Performed by: INTERNAL MEDICINE

## 2021-08-06 PROCEDURE — 25010000002 ONDANSETRON PER 1 MG: Performed by: NURSE PRACTITIONER

## 2021-08-06 PROCEDURE — 25010000002 METHYLPREDNISOLONE PER 125 MG: Performed by: INTERNAL MEDICINE

## 2021-08-06 PROCEDURE — B2181ZZ FLUOROSCOPY OF LEFT INTERNAL MAMMARY BYPASS GRAFT USING LOW OSMOLAR CONTRAST: ICD-10-PCS | Performed by: INTERNAL MEDICINE

## 2021-08-06 PROCEDURE — 85730 THROMBOPLASTIN TIME PARTIAL: CPT | Performed by: INTERNAL MEDICINE

## 2021-08-06 PROCEDURE — B2111ZZ FLUOROSCOPY OF MULTIPLE CORONARY ARTERIES USING LOW OSMOLAR CONTRAST: ICD-10-PCS | Performed by: INTERNAL MEDICINE

## 2021-08-06 PROCEDURE — 36415 COLL VENOUS BLD VENIPUNCTURE: CPT | Performed by: NURSE PRACTITIONER

## 2021-08-06 PROCEDURE — 63710000001 INSULIN LISPRO (HUMAN) PER 5 UNITS: Performed by: INTERNAL MEDICINE

## 2021-08-06 PROCEDURE — 4A023N7 MEASUREMENT OF CARDIAC SAMPLING AND PRESSURE, LEFT HEART, PERCUTANEOUS APPROACH: ICD-10-PCS | Performed by: INTERNAL MEDICINE

## 2021-08-06 PROCEDURE — 83735 ASSAY OF MAGNESIUM: CPT | Performed by: NURSE PRACTITIONER

## 2021-08-06 PROCEDURE — 25010000002 MIDAZOLAM PER 1 MG: Performed by: INTERNAL MEDICINE

## 2021-08-06 PROCEDURE — 82962 GLUCOSE BLOOD TEST: CPT

## 2021-08-06 PROCEDURE — C1894 INTRO/SHEATH, NON-LASER: HCPCS | Performed by: INTERNAL MEDICINE

## 2021-08-06 PROCEDURE — 84100 ASSAY OF PHOSPHORUS: CPT | Performed by: NURSE PRACTITIONER

## 2021-08-06 PROCEDURE — C1769 GUIDE WIRE: HCPCS | Performed by: INTERNAL MEDICINE

## 2021-08-06 PROCEDURE — 99232 SBSQ HOSP IP/OBS MODERATE 35: CPT | Performed by: HOSPITALIST

## 2021-08-06 PROCEDURE — 94762 N-INVAS EAR/PLS OXIMTRY CONT: CPT

## 2021-08-06 PROCEDURE — 25010000002 DIPHENHYDRAMINE PER 50 MG: Performed by: INTERNAL MEDICINE

## 2021-08-06 PROCEDURE — 99232 SBSQ HOSP IP/OBS MODERATE 35: CPT | Performed by: INTERNAL MEDICINE

## 2021-08-06 PROCEDURE — 85347 COAGULATION TIME ACTIVATED: CPT

## 2021-08-06 PROCEDURE — 80053 COMPREHEN METABOLIC PANEL: CPT | Performed by: NURSE PRACTITIONER

## 2021-08-06 PROCEDURE — 25010000002 FENTANYL CITRATE (PF) 100 MCG/2ML SOLUTION: Performed by: INTERNAL MEDICINE

## 2021-08-06 RX ORDER — LIDOCAINE HYDROCHLORIDE 20 MG/ML
INJECTION, SOLUTION INFILTRATION; PERINEURAL AS NEEDED
Status: DISCONTINUED | OUTPATIENT
Start: 2021-08-06 | End: 2021-08-06 | Stop reason: HOSPADM

## 2021-08-06 RX ORDER — SODIUM CHLORIDE 9 MG/ML
250 INJECTION, SOLUTION INTRAVENOUS ONCE AS NEEDED
Status: DISCONTINUED | OUTPATIENT
Start: 2021-08-06 | End: 2021-08-09 | Stop reason: HOSPADM

## 2021-08-06 RX ORDER — SODIUM CHLORIDE 9 MG/ML
100 INJECTION, SOLUTION INTRAVENOUS CONTINUOUS
Status: DISCONTINUED | OUTPATIENT
Start: 2021-08-06 | End: 2021-08-08

## 2021-08-06 RX ORDER — SODIUM CHLORIDE 9 MG/ML
75 INJECTION, SOLUTION INTRAVENOUS CONTINUOUS
Status: DISCONTINUED | OUTPATIENT
Start: 2021-08-06 | End: 2021-08-06

## 2021-08-06 RX ORDER — MIDAZOLAM HYDROCHLORIDE 1 MG/ML
INJECTION INTRAMUSCULAR; INTRAVENOUS AS NEEDED
Status: DISCONTINUED | OUTPATIENT
Start: 2021-08-06 | End: 2021-08-06 | Stop reason: HOSPADM

## 2021-08-06 RX ORDER — FENTANYL CITRATE 50 UG/ML
INJECTION, SOLUTION INTRAMUSCULAR; INTRAVENOUS AS NEEDED
Status: DISCONTINUED | OUTPATIENT
Start: 2021-08-06 | End: 2021-08-06 | Stop reason: HOSPADM

## 2021-08-06 RX ORDER — METHYLPREDNISOLONE SODIUM SUCCINATE 125 MG/2ML
125 INJECTION, POWDER, LYOPHILIZED, FOR SOLUTION INTRAMUSCULAR; INTRAVENOUS ONCE
Status: COMPLETED | OUTPATIENT
Start: 2021-08-06 | End: 2021-08-06

## 2021-08-06 RX ORDER — ACETAMINOPHEN 325 MG/1
650 TABLET ORAL EVERY 4 HOURS PRN
Status: DISCONTINUED | OUTPATIENT
Start: 2021-08-06 | End: 2021-08-06

## 2021-08-06 RX ORDER — DIPHENHYDRAMINE HYDROCHLORIDE 50 MG/ML
50 INJECTION INTRAMUSCULAR; INTRAVENOUS ONCE
Status: COMPLETED | OUTPATIENT
Start: 2021-08-06 | End: 2021-08-06

## 2021-08-06 RX ORDER — MIDAZOLAM HYDROCHLORIDE 1 MG/ML
1 INJECTION INTRAMUSCULAR; INTRAVENOUS ONCE
Status: DISCONTINUED | OUTPATIENT
Start: 2021-08-06 | End: 2021-08-06

## 2021-08-06 RX ORDER — FENTANYL CITRATE 50 UG/ML
25 INJECTION, SOLUTION INTRAMUSCULAR; INTRAVENOUS ONCE
Status: DISCONTINUED | OUTPATIENT
Start: 2021-08-06 | End: 2021-08-06

## 2021-08-06 RX ORDER — SODIUM CHLORIDE 9 MG/ML
1-3 INJECTION, SOLUTION INTRAVENOUS CONTINUOUS
Status: DISCONTINUED | OUTPATIENT
Start: 2021-08-06 | End: 2021-08-06

## 2021-08-06 RX ADMIN — INSULIN LISPRO 3 UNITS: 100 INJECTION, SOLUTION INTRAVENOUS; SUBCUTANEOUS at 00:25

## 2021-08-06 RX ADMIN — INSULIN LISPRO 3 UNITS: 100 INJECTION, SOLUTION INTRAVENOUS; SUBCUTANEOUS at 11:49

## 2021-08-06 RX ADMIN — METHYLPREDNISOLONE SODIUM SUCCINATE 125 MG: 125 INJECTION, POWDER, FOR SOLUTION INTRAMUSCULAR; INTRAVENOUS at 15:27

## 2021-08-06 RX ADMIN — HEPARIN SODIUM 9.26 UNITS/KG/HR: 10000 INJECTION, SOLUTION INTRAVENOUS at 02:46

## 2021-08-06 RX ADMIN — ASPIRIN 81 MG: 81 TABLET, COATED ORAL at 08:16

## 2021-08-06 RX ADMIN — ATORVASTATIN CALCIUM 40 MG: 40 TABLET, FILM COATED ORAL at 21:50

## 2021-08-06 RX ADMIN — HEPARIN SODIUM 20.26 UNITS/KG/HR: 10000 INJECTION, SOLUTION INTRAVENOUS at 15:55

## 2021-08-06 RX ADMIN — INSULIN LISPRO 8 UNITS: 100 INJECTION, SOLUTION INTRAVENOUS; SUBCUTANEOUS at 23:02

## 2021-08-06 RX ADMIN — SODIUM CHLORIDE 100 ML/HR: 9 INJECTION, SOLUTION INTRAVENOUS at 08:16

## 2021-08-06 RX ADMIN — Medication 1200 MG: at 08:16

## 2021-08-06 RX ADMIN — ONDANSETRON 4 MG: 2 INJECTION INTRAMUSCULAR; INTRAVENOUS at 08:42

## 2021-08-06 RX ADMIN — DIPHENHYDRAMINE HYDROCHLORIDE 50 MG: 50 INJECTION, SOLUTION INTRAMUSCULAR; INTRAVENOUS at 15:27

## 2021-08-06 NOTE — PROGRESS NOTES
"PULMONARY CRITICAL CARE Progress  NOTE      PATIENT IDENTIFICATION:  Name: Yogi Tucker  MRN: [id number]  :  1980     Age: 40 y.o.  Sex: male    DATE OF Note:  2021   Referring Physician: Seven Mcneil MD                  Subjective:   No shortness of breath no SOB no chest pain,   +nausea / vomiting,   no change in bowel habit,   Good urine out put ,  no new  skin rash or itching.      Objective:  tMax 24 hrs: Temp (24hrs), Av.9 °F (36.6 °C), Min:97.5 °F (36.4 °C), Max:98.5 °F (36.9 °C)      Vitals Ranges:   Temp:  [97.5 °F (36.4 °C)-98.5 °F (36.9 °C)] 97.8 °F (36.6 °C)  Heart Rate:  [] 105  Resp:  [17-25] 17  BP: ()/(66-77) 113/75    Intake and Output Last 3 Shifts:   I/O last 3 completed shifts:  In: 3513 [P.O.:880; I.V.:2633]  Out: 1500 [Urine:1500]    Exam:  /75 (BP Location: Left arm, Patient Position: Sitting)   Pulse 105   Temp 97.8 °F (36.6 °C) (Oral)   Resp 17   Ht 182.9 cm (72\")   Wt 117 kg (258 lb 6.1 oz)   SpO2 96%   BMI 35.04 kg/m²     General Appearance:   AA  HEENT:  Normocephalic, without obvious abnormality, Conjunctiva/corneas clear,.  Normal external ear canals, Nares normal, no drainage     Neck:  Supple, symmetrical, trachea midline. No JVD.  Lungs /Chest wall:   Bilateral basal rhonchi, respirations unlabored symmetrical wall movement.     Heart:  Regular rate and rhythm, systolic murmur PMI left sternal border  Abdomen: Soft, non-tender, no masses, no organomegaly.    Extremities: Trace edema no clubbing or Cyanosis        Medications:    Current Facility-Administered Medications:   •  acetaminophen (TYLENOL) tablet 650 mg, 650 mg, Oral, Q4H PRN **OR** acetaminophen (TYLENOL) suppository 650 mg, 650 mg, Rectal, Q4H PRN, Micha Ferreira APRN  •  aluminum-magnesium hydroxide-simethicone (MAALOX MAX) 400-400-40 MG/5ML suspension 15 mL, 15 mL, Oral, Q6H PRN, Micha Ferreira APRN  •  aspirin EC tablet 81 mg, 81 mg, Oral, Daily, Micha Ferreira" STARR, APRN, 81 mg at 08/06/21 0816  •  atorvastatin (LIPITOR) tablet 40 mg, 40 mg, Oral, Nightly, Micha Ferreira APRN, 40 mg at 08/05/21 2114  •  dextrose (D50W) 25 g/ 50mL Intravenous Solution 25 g, 25 g, Intravenous, Q15 Min PRN, Micha Ferreira APRN  •  dextrose (GLUTOSE) oral gel 15 g, 15 g, Oral, Q15 Min PRN, Micha Ferreira APRN  •  glucagon (human recombinant) (GLUCAGEN DIAGNOSTIC) injection 1 mg, 1 mg, Subcutaneous, Q15 Min PRN, Micha Ferreira APRN  •  heparin 09969 units/250 mL (100 units/mL) in 0.45 % NaCl infusion, 9.26 Units/kg/hr, Intravenous, Titrated, Shan Sheriff MD, Last Rate: 11.08 mL/hr at 08/06/21 0834, 10.26 Units/kg/hr at 08/06/21 0834  •  heparin bolus from bag 2,500 Units, 2,500 Units, Intravenous, Q6H PRN, Shan Sheriff MD, 2,500 Units at 08/06/21 0835  •  heparin bolus from bag 5,000 Units, 5,000 Units, Intravenous, Q6H PRN, Shan Sheriff MD, 5,000 Units at 08/04/21 0642  •  insulin lispro (ADMELOG) injection 0-14 Units, 0-14 Units, Subcutaneous, Q6H, 3 Units at 08/06/21 0025 **AND** insulin lispro (ADMELOG) injection 0-14 Units, 0-14 Units, Subcutaneous, PRN, Micha Ferreira APRN  •  nitroglycerin (NITROSTAT) SL tablet 0.4 mg, 0.4 mg, Sublingual, Q5 Min PRN, Micha Ferreira APRN  •  ondansetron (ZOFRAN) tablet 4 mg, 4 mg, Oral, Q6H PRN **OR** ondansetron (ZOFRAN) injection 4 mg, 4 mg, Intravenous, Q6H PRN, Micha Ferreira APRN, 4 mg at 08/04/21 2231  •  sodium chloride 0.9 % infusion, 100 mL/hr, Intravenous, Continuous, Karen Espinosa MD, Last Rate: 100 mL/hr at 08/06/21 0816, 100 mL/hr at 08/06/21 0816    Data Review:  All labs (24hrs):   Recent Results (from the past 24 hour(s))   Troponin    Collection Time: 08/05/21 10:43 AM    Specimen: Blood   Result Value Ref Range    Troponin T 4.240 (C) 0.000 - 0.030 ng/mL   aPTT    Collection Time: 08/05/21 10:43 AM    Specimen: Blood   Result Value Ref Range    PTT 49.6 (L) 61.0 - 76.5 seconds   POC Glucose Once     Collection Time: 08/05/21 11:39 AM    Specimen: Blood   Result Value Ref Range    Glucose 147 (H) 70 - 105 mg/dL   POC Glucose Once    Collection Time: 08/05/21  3:29 PM    Specimen: Blood   Result Value Ref Range    Glucose 169 (H) 70 - 105 mg/dL   Troponin    Collection Time: 08/05/21  5:25 PM    Specimen: Blood   Result Value Ref Range    Troponin T 5.030 (C) 0.000 - 0.030 ng/mL   Basic Metabolic Panel    Collection Time: 08/05/21  5:25 PM    Specimen: Blood   Result Value Ref Range    Glucose 136 (H) 65 - 99 mg/dL    BUN 40 (H) 6 - 20 mg/dL    Creatinine 1.76 (H) 0.76 - 1.27 mg/dL    Sodium 134 (L) 136 - 145 mmol/L    Potassium 4.9 3.5 - 5.2 mmol/L    Chloride 101 98 - 107 mmol/L    CO2 23.0 22.0 - 29.0 mmol/L    Calcium 8.6 8.6 - 10.5 mg/dL    eGFR Non African Amer 43 (L) >60 mL/min/1.73    BUN/Creatinine Ratio 22.7 7.0 - 25.0    Anion Gap 10.0 5.0 - 15.0 mmol/L   CBC Auto Differential    Collection Time: 08/05/21  5:25 PM    Specimen: Blood   Result Value Ref Range    WBC 11.90 (H) 3.40 - 10.80 10*3/mm3    RBC 4.33 4.14 - 5.80 10*6/mm3    Hemoglobin 12.9 (L) 13.0 - 17.7 g/dL    Hematocrit 39.7 37.5 - 51.0 %    MCV 91.7 79.0 - 97.0 fL    MCH 29.8 26.6 - 33.0 pg    MCHC 32.5 31.5 - 35.7 g/dL    RDW 14.5 12.3 - 15.4 %    RDW-SD 45.5 37.0 - 54.0 fl    MPV 9.9 6.0 - 12.0 fL    Platelets 194 140 - 450 10*3/mm3    Neutrophil % 68.1 42.7 - 76.0 %    Lymphocyte % 19.4 (L) 19.6 - 45.3 %    Monocyte % 11.6 5.0 - 12.0 %    Eosinophil % 0.2 (L) 0.3 - 6.2 %    Basophil % 0.7 0.0 - 1.5 %    Neutrophils, Absolute 8.10 (H) 1.70 - 7.00 10*3/mm3    Lymphocytes, Absolute 2.30 0.70 - 3.10 10*3/mm3    Monocytes, Absolute 1.40 (H) 0.10 - 0.90 10*3/mm3    Eosinophils, Absolute 0.00 0.00 - 0.40 10*3/mm3    Basophils, Absolute 0.10 0.00 - 0.20 10*3/mm3    nRBC 0.1 0.0 - 0.2 /100 WBC   aPTT    Collection Time: 08/05/21  6:30 PM    Specimen: Blood   Result Value Ref Range    PTT 46.1 (L) 61.0 - 76.5 seconds   Troponin    Collection Time:  08/06/21 12:12 AM    Specimen: Blood   Result Value Ref Range    Troponin T 4.690 (C) 0.000 - 0.030 ng/mL   aPTT    Collection Time: 08/06/21 12:12 AM    Specimen: Blood   Result Value Ref Range    PTT 45.8 (L) 61.0 - 76.5 seconds   Magnesium    Collection Time: 08/06/21 12:12 AM    Specimen: Blood   Result Value Ref Range    Magnesium 2.3 1.6 - 2.6 mg/dL   Phosphorus    Collection Time: 08/06/21 12:12 AM    Specimen: Blood   Result Value Ref Range    Phosphorus 3.1 2.5 - 4.5 mg/dL   Comprehensive Metabolic Panel    Collection Time: 08/06/21 12:12 AM    Specimen: Blood   Result Value Ref Range    Glucose 190 (H) 65 - 99 mg/dL    BUN 39 (H) 6 - 20 mg/dL    Creatinine 1.79 (H) 0.76 - 1.27 mg/dL    Sodium 131 (L) 136 - 145 mmol/L    Potassium 4.9 3.5 - 5.2 mmol/L    Chloride 99 98 - 107 mmol/L    CO2 21.0 (L) 22.0 - 29.0 mmol/L    Calcium 8.4 (L) 8.6 - 10.5 mg/dL    Total Protein 6.5 6.0 - 8.5 g/dL    Albumin 3.00 (L) 3.50 - 5.20 g/dL    ALT (SGPT) 113 (H) 1 - 41 U/L    AST (SGOT) 96 (H) 1 - 40 U/L    Alkaline Phosphatase 105 39 - 117 U/L    Total Bilirubin 0.5 0.0 - 1.2 mg/dL    eGFR Non African Amer 42 (L) >60 mL/min/1.73    Globulin 3.5 gm/dL    A/G Ratio 0.9 g/dL    BUN/Creatinine Ratio 21.8 7.0 - 25.0    Anion Gap 11.0 5.0 - 15.0 mmol/L   POC Glucose Once    Collection Time: 08/06/21 12:14 AM    Specimen: Blood   Result Value Ref Range    Glucose 166 (H) 70 - 105 mg/dL   Troponin    Collection Time: 08/06/21  5:53 AM    Specimen: Blood   Result Value Ref Range    Troponin T 4.300 (C) 0.000 - 0.030 ng/mL   CBC Auto Differential    Collection Time: 08/06/21  5:53 AM    Specimen: Blood   Result Value Ref Range    WBC 10.60 3.40 - 10.80 10*3/mm3    RBC 4.38 4.14 - 5.80 10*6/mm3    Hemoglobin 13.1 13.0 - 17.7 g/dL    Hematocrit 39.8 37.5 - 51.0 %    MCV 90.9 79.0 - 97.0 fL    MCH 29.9 26.6 - 33.0 pg    MCHC 32.9 31.5 - 35.7 g/dL    RDW 14.3 12.3 - 15.4 %    RDW-SD 45.9 37.0 - 54.0 fl    MPV 9.8 6.0 - 12.0 fL     Platelets 173 140 - 450 10*3/mm3    Neutrophil % 68.7 42.7 - 76.0 %    Lymphocyte % 18.9 (L) 19.6 - 45.3 %    Monocyte % 11.7 5.0 - 12.0 %    Eosinophil % 0.2 (L) 0.3 - 6.2 %    Basophil % 0.5 0.0 - 1.5 %    Neutrophils, Absolute 7.30 (H) 1.70 - 7.00 10*3/mm3    Lymphocytes, Absolute 2.00 0.70 - 3.10 10*3/mm3    Monocytes, Absolute 1.20 (H) 0.10 - 0.90 10*3/mm3    Eosinophils, Absolute 0.00 0.00 - 0.40 10*3/mm3    Basophils, Absolute 0.10 0.00 - 0.20 10*3/mm3    nRBC 0.1 0.0 - 0.2 /100 WBC   aPTT    Collection Time: 08/06/21  5:53 AM    Specimen: Blood   Result Value Ref Range    PTT 50.2 (L) 61.0 - 76.5 seconds   POC Glucose Once    Collection Time: 08/06/21  6:03 AM    Specimen: Blood   Result Value Ref Range    Glucose 139 (H) 70 - 105 mg/dL   aPTT    Collection Time: 08/06/21  7:41 AM    Specimen: Blood   Result Value Ref Range    PTT 52.6 (L) 61.0 - 76.5 seconds        Imaging:  Adult Transthoracic Echo Complete w/ Color, Spectral and Contrast if Necessary Per Protocol  · Left ventricular ejection fraction appears to be less than 20%.  · The right ventricular cavity is mildly dilated.  · Severe mitral valve regurgitation is present.  · Moderate tricuspid valve regurgitation is present.  · No pericardial effusion noted     US Renal Bilateral  Narrative: RENAL ULTRASOUND        INDICATION: Renal failure.    COMPARISON: None.    TECHNIQUE: Gray scale images were obtained of the kidneys and bladder.    FINDINGS:  RIGHT KIDNEY:   Size: 11.0 cm in length.    Parenchymal thickness: Normal.   No hydronephrosis.    LEFT KIDNEY:   Size: 11.7 cm in length.     Parenchymal thickness: Normal.    No hydronephrosis.    BLADDER:   Partially fluid filled and unremarkable.    VASCULATURE:    Visualized aorta and retrohepatic IVC are normal.    Impression: Normal renal ultrasound.    Electronically signed by:  Timbo Miguel M.D.    8/5/2021 3:26 AM  XR Chest 1 View  Narrative: EXAMINATION: Chest one view.    DATE:  8/4/2021.    COMPARISON: None available.    CLINICAL HISTORY: Chest pain.    FINDINGS:    The lungs and pleural spaces are clear.    The cardiomediastinal silhouette is mildly enlarged and the pulmonary vessels are within normal limits. There is a left-sided AICD generator overlying the right ventricle. There are midline sternotomy wires.  Impression: Mild cardiomegaly with no acute findings otherwise seen.    Electronically signed by:  Warren Luna D.O.    8/4/2021 10:04 PM       ASSESSMENT:    NSTEMI (non-ST elevated myocardial infarction) (CMS/[facility])    Cardiomyopathy, ischemic    Coronary artery disease    Heart failure (CMS/HCC)  DEEPA    Hyperlipidemia    Hypertensive disorder    Presence of automatic implantable cardioverter-defibrillator    Cardiogenic shock (CMS/[facility])    Severe sepsis, not felt to be septic shock    Type 2 diabetes mellitus with hyperglycemia (CMS/HCC)    SATHISH (acute kidney injury) (CMS/[facility])    H/O noncompliance with medical treatment, presenting hazards to health     PLAN:     O2 support wean down keep sat more than 88  Consider BIPAP while sleeping  Bronchodilator  Inhaled corticosteroids  Electrolytes/ glycemic control  DVT and GI prophylaxis.    Total Critical care time in direct medical management (   ) minutes  Alissa Vargas MD. D, ABSM.     8/6/2021  08:38 EDT

## 2021-08-06 NOTE — PROGRESS NOTES
"Heart Failure Program  Nurse Navigator  Discharge Planning    Patient Name:Yogi Tucker  :1980  Cardiologist: Td  Current Admission Date: 8/3/2021   Previous Admission:   Admission frequency: 1  admissions in 6 months    Heart Failure history per record:    Symptoms on admission: SOA, chest tightness edema       Admission Weight:  Flowsheet Rows      First Filed Value   Admission Height  182.9 cm (72\") Documented at 2021 1345   Admission Weight  108 kg (237 lb 7 oz) Documented at 2021 1345            Current Home Medications:  Prior to Admission medications    Medication Sig Start Date End Date Taking? Authorizing Provider   acetaminophen (TYLENOL) 325 MG tablet Take 650 mg by mouth As Needed for Mild Pain .   Yes Michael Angel MD   aspirin 325 MG tablet Take 325 mg by mouth Every Morning. 12  Yes Michael Angel MD   carvedilol (COREG) 25 MG tablet TAKE 1 TABLET TWICE A DAY 20  Yes Carl Appiah MD   dapagliflozin (Farxiga) 5 MG tablet tablet Take 5 mg by mouth Daily.   Yes Michael Angel MD   Entresto 49-51 MG tablet TAKE 1 TABLET TWICE A DAY 20  Yes Carl Appiah MD   glimepiride (AMARYL) 4 MG tablet Take 4 mg by mouth 2 (two) times a day. 12  Yes Michael Angel MD   metFORMIN (GLUCOPHAGE) 1000 MG tablet Take 1,000 mg by mouth 2 (Two) Times a Day. 10/12/20  Yes Michael Angel MD   naproxen sodium (ALEVE) 220 MG tablet Take 220 mg by mouth As Needed.   Yes Michael Angel MD   simvastatin (ZOCOR) 40 MG tablet TAKE 1 TABLET ONCE DAILY 20  Yes Carl Appiah MD       Social history:   Patient live at home with his wife they report that he was diagnosed  with HF 9 years ago, he was not on any fluid restriction and was no aware so we discussed in depth,     Smoking status: NA     Diagnostics Testing:  proBNP level:  23182    Echocardiogram:Results for orders placed during the hospital encounter of 21    Adult " Transthoracic Echo Complete w/ Color, Spectral and Contrast if Necessary Per Protocol    Interpretation Summary  · Left ventricular ejection fraction appears to be less than 20%.  · The right ventricular cavity is mildly dilated.  · Severe mitral valve regurgitation is present.  · Moderate tricuspid valve regurgitation is present.  · No pericardial effusion noted        Patient Assessment:   Patient was in bed family at bedside, no distress, no SOA, no Edema, reports he is awaiting a heart cath today     Current O2:  RA   Home O2:     Education provided to patient:  yes- Heart Failure disease education  yes -Symptom identification/management  yes -Daily Weights  yes- Diet education  yes- Fluid restriction (if ordered)  yes- Activity education  yes- Medication education  NA - Smoking cessation  yes- Follow-up Appointments  yes-Provided information on how to access [facility] My HF Guide/Heart Failure Interactive workbook    Acceptance of learning: Patient and wife were both accepting     Heart Failure education interactive teaching session time: 45 minutes    Identified needs/barriers:   Fluid intake   Sodium intake   S/s     Intervention:   [id number] left with patient

## 2021-08-06 NOTE — PLAN OF CARE
Goal Outcome Evaluation:  Plan of Care Reviewed With: patient        Progress: no change  Outcome Summary: Patient came from [facility]. Wife at bedside. Pt. has heparin running. Vitals have been normal. No c/o pain. Supposed to be getting a heart cath. No orders as of yet. Continue to monitor.

## 2021-08-06 NOTE — CONSULTS
"Diabetes Education  Assessment/Teaching    Patient Name:  Yogi Tucker  YOB: 1980  MRN: [id number]  Admit Date:  8/3/2021      Assessment Date:  8/6/2021    Most Recent Value   General Information    Referral From:  A1c [On 8/3/2021 A1c was 7.5%.]   Height  182.9 cm (72\")   Height Method  Stated   Weight  117 kg (258 lb 6.1 oz)   Weight Method  Bed scale   Pregnancy Assessment   Diabetes History   What type of diabetes do you have?  Type 2   Current DM knowledge  fair   Do you test your blood sugar at home?  yes   Frequency of checks  rarely but when feels dizzy or sweaty   Meter type  One Touch >5 years old   Who performs the test?  patient   Have you had high blood sugar? (>140mg/dl)  yes   How often do you have high blood sugar?  unknown   When was your last high blood sugar?  Admission blood sugar 196   Education Preferences   What areas of diabetes would you like to learn about?  avoiding high blood sugar, diabetes complications, testing my blood sugar at home   Nutrition Information   Assessment Topics   Problem Solving - Assessment  Needs education   Reducing Risk - Assessment  Needs education   Monitoring - Assessment  Needs education   DM Goals   Problem Solving - Goal  Today   Reducing Risk - Goal  Today   Monitoring - Goal  Today            Most Recent Value   DM Education Needs   Meter  Meter provided   Meter Type  One Touch [One Touch Verio Reflect given to patient with patient doing return demonstration using the lancing device, inserting the test strip into the meter, and applying blood to the test strip. Blood sugar was 155.]   Frequency of Testing  Daily [Discussed trying to check blood sugar daily varying the times before meals and at bedtime.]   Medication  Oral [Patient stated that he takes Metformin 1,000 mg BID, Glimepiride 4 mg BID, and Farixga 5 mg daily at home.]   Problem Solving  Hyperglycemia, Signs, Symptoms, Treatment   Reducing Risks  A1C testing [On 8/3/2021 A1c was " 7.5%.]   Discharge Plan  Home   Motivation  Moderate   Teaching Method  Discussion, Demonstration, Handouts, Teach back, Explanation   Patient Response  Demonstrates adequately, Verbalized understanding [Wife at bedside and involved with discussion.]            Other Comments:  A1c info sheet given with discussion on A1c target and healthy blood sugar range. Discussed importance of checking blood sugar daily to help with blood sugar control and decrease complications related to diabetes.  Prescriptions started in discharge orders for lancets, test strips, and alcohol wipes. Patient and wife have no further questions or concerns related to diabetes at this time.                                                  Electronically signed by:  Kate Gonzalez RN  08/06/21 17:05 EDT

## 2021-08-06 NOTE — CONSULTS
[facility] Medicine Services - Consult Note    Patient Name: Yogi Tucker  : 1980  MRN: [id number]  Primary Care Physician:  Mey Cox  Referring Physician: Mey Cox  Date of admission: 8/3/2021    Consults    Subjective      Reason for Consult/ Chief Complaint: Chest pain/shortness of breath    History of Present Illness: Yogi Tucker is a 40 y.o. male with history of coronary artery disease status post coronary artery bypass surgery, congestive heart failure with ischemic cardiomyopathy, coronary artery disease, AICD pacemaker, hypertension, hyperlipidemia, renal insufficiency, diabetes mellitus type II diabetic neuropathy, obesity and vitamin D deficiency was admitted to [facility] on 2021 with complaints of rest of breath chest tightness.  At that time he stated he had some muscle aching and feeling like his legs were swollen.  Patient had had a productive cough, was swabbed for COVID-19 with negative results, patient confirmed at that time he was fully vaccinated against COVID-19.  On the night of August 3, 2021 patient reports he had had a syncopal with collapse and loss of consciousness for undetermined amount of time due to the episode began unwitnessed, but he woke up on the floor sometime later.  He denied nausea vomiting and diarrhea at the time, confirmed diaphoresis the night before with accompanying palpitations, he does have a history of a CABG x3 on 2012, he reported comparable symptoms at that time.  Upon admission patient had elevated troponin of 3.420 and was placed on a heparin drip in the emergency room and awaited possible cardiac catheterization.  At time of admission patient had positive elevated troponin III 0.420 stated above, proBNP of 13,350, glucose 196, sodium 135, creatinine 1.89, BUN 25, ALT 44, , CRP 14.42, procalcitonin 0.36, PTT 28.8 white blood cell count 15.20, UA showed greater than 1000 glucose  and +1 protein.  Patient was admitted to the ICU, and cardiology was consulted, started on IV fluids per sepsis protocol, and dopamine drip to keep MAP greater than 65 mm Hg. that was weaned off the next day.  As above stated patient had SATHISH on admission with creatinine 1.89 nephrology was consulted on upon arrival to ICU.  On August 5, 2020 1 in the AM patient denied chest pain and shortness of breath and abdominal pain.  Renal evaluation on August 5, 2021 recommended cardiac catheterization if creatinine remained around 1.8 but would like some time for renal improvement if cardiac catheterization was not emergent Mucomyst was initiated, and light rehydration was ordered with caution of precath IV fluids, renal ultrasound was ordered and completed revealing normal renal ultrasound. Cardiology assessment on August 5, 2021 indicated echocardiogram showed showed severe mitral valve regurgitation, and tricuspid regurgitation.  Plan for cardiac cath for August 6, 2021.   Hospitalist service was consulted for medical management today August 5, 2021. Patient reports no chest pain, but does experience shortness of air when lying flat.  He is currently resting in bed with no signs of acute distress.  Vital signs have improved with systolic blood pressures as high as 115 today.   Review of Systems   Constitutional: Negative for chills, decreased appetite, diaphoresis, malaise/fatigue, night sweats, weight gain and weight loss.   HENT: Negative.    Eyes: Negative.    Cardiovascular: Positive for dyspnea on exertion and irregular heartbeat. Negative for chest pain and syncope.   Respiratory: Positive for shortness of breath.    Endocrine: Negative.    Skin: Negative.    Musculoskeletal: Negative.    Gastrointestinal: Negative.    Genitourinary: Negative.    Neurological: Negative.    Psychiatric/Behavioral: Negative.         Personal History     Past Medical History:   Diagnosis Date   • Coronary artery disease    •  Hyperlipidemia    • Hypertension        Past Surgical History:   Procedure Laterality Date   • CORONARY ARTERY BYPASS GRAFT     • LEFT HEART CATH         Family History: family history is not on file. Otherwise pertinent FHx was reviewed and not pertinent to current issue.    Social History:  reports that he has never smoked. He has quit using smokeless tobacco.  His smokeless tobacco use included chew.    Home Medications:   acetaminophen, aspirin, carvedilol, dapagliflozin, glimepiride, metFORMIN, naproxen sodium, sacubitril-valsartan, and simvastatin    Allergies:  Allergies   Allergen Reactions   • Shellfish Allergy Unknown (See Comments)         Objective      Vitals:  Temp:  [97.6 °F (36.4 °C)-99 °F (37.2 °C)] 98.5 °F (36.9 °C)  Heart Rate:  [] 103  Resp:  [25] 25  BP: ()/(61-79) 99/77  Flow (L/min):  [2-6] 2    Physical Exam  Constitutional:       Appearance: He is not ill-appearing or toxic-appearing.   HENT:      Head: Normocephalic and atraumatic.      Nose: Nose normal.   Eyes:      Conjunctiva/sclera: Conjunctivae normal.   Cardiovascular:      Rate and Rhythm: Normal rate.      Pulses: Normal pulses.      Heart sounds: Normal heart sounds.   Pulmonary:      Breath sounds: Normal breath sounds.   Abdominal:      General: There is no distension.      Palpations: Abdomen is soft.      Tenderness: There is no abdominal tenderness.   Musculoskeletal:         General: Normal range of motion.      Cervical back: Normal range of motion and neck supple.      Right lower leg: Edema present.      Left lower leg: Edema present.   Skin:     General: Skin is warm and dry.      Capillary Refill: Capillary refill takes less than 2 seconds.   Neurological:      Mental Status: He is alert and oriented to person, place, and time. Mental status is at baseline.   Psychiatric:         Mood and Affect: Mood normal.         Behavior: Behavior normal.         Thought Content: Thought content normal.          Judgment: Judgment normal.          Result Review    Result Review:  I have personally reviewed the results from the time of this admission to 8/5/2021 20:02 EDT and agree with these findings:  [x]  Laboratory  [x]  Microbiology  [x]  Radiology  [x]  EKG/Telemetry   []  Cardiology/Vascular   []  Pathology  [x]  Old records  []  Other:    Troponin trending downward as expected, sodium 134, white count 11.9, hemoglobin 12.9    Assessment/Plan        Active Hospital Problems:  Active Hospital Problems    Diagnosis    • **NSTEMI (non-ST elevated myocardial infarction) (CMS/[facility])    • Cardiogenic shock (CMS/[facility])    • Severe sepsis, not felt to be septic shock    • SATHISH (acute kidney injury) (CMS/[facility])    • H/O noncompliance with medical treatment, presenting hazards to health    • Type 2 diabetes mellitus with hyperglycemia (CMS/[facility])    • Coronary artery disease    • Hypertensive disorder    • Hyperlipidemia    • Cardiomyopathy, ischemic    • Presence of automatic implantable cardioverter-defibrillator    • Heart failure (CMS/[facility])      Plan:    NSTEMI (non-ST elevated myocardial infarction) Cardiomyopathy, ischemic  -Cardiology was consulted  -Cardiac cath scheduled for August 6, 2021  -Patient currently on heparin drip  -Currently troponins trending downward  -EKG reviewed    -Leukocytosis/unlikely severe sepsis most likely cardiogenic shock   -Patient was given fluid bolus in ED.    -Continuous IV fluids initiated  -Dopamine drip was initiated to maintain MAP >65mmhg has since weaned off  -Lactic acid was 1.9  -Blood cultures are pending  -Negative UA  -Procalcitonin 0.36, CRP 14-42  -Antibiotics were subsequently discontinued  -Continue to monitor off antibiotics.    Ischemic cardiomyopathy/systolic heart failure with reduced ejection/presence of automatic implantable cardioverter-defibrillator   -Ischemic cardiomyopathy/systolic heart failure likely due to uncontrolled hypertension.  -Heart failure not  to be in  exacerbation  -proBNP on August 3, 2021 was 13,350  -Last ejection fraction was 35% in January 2018 echocardiogram from August 3, 2021 shows left ejection fraction less than 20% with a right ventricular cavity mildly dilated, severe mitral valve regurgitation, moderate tricuspid valve regurgitation, with no pericardial effusions noted.  -As stated above cardiology consulted    Elevated LFTs  Likely secondary to cardiogenic shock  -ALT 44,   -  -Continue to monitor liver function    Hyperlipidemia  -Patient on atorvastatin at home  -Hold until liver enzymes normalize  -Last lipid panel shows total cholesterol 130, HDL cholesterol 51, LDL cholesterol 56, VLDL cholesterol 23, triglycerides 133, LDL-HDL ratio 1.03  -Encourage lifestyle modifications to include reduced cholesterol heart healthy diet.    Hypertensive disorder  -Patient currentl borderline hypotensive requiring support to maintain adequate perfusion.  -May continue Coreg per cardiology recommendations when clinically appropriate.     Type 2 diabetes mellitus with hyperglycemia   -Patient has been noncompliant with medical management  -Hemoglobin A1c 7.5  -Holding home Metformin and glimepiride during hospitalization due to risk of acidosis.  As well as better glycemic control with sliding scale insulin during hospitalization  -Continue sliding scale       SATHISH (acute kidney injury) (CMS/HCC)  -Likely due to cardiogenic shock/hypotension  -IV fluids initiated on admission  -Avoid nephrotoxic agents  -Avoid hypotension  -Creatinine 1.76 today, BUN 40 today  -Nephrology following     H/O noncompliance with medical treatment, presenting hazards to health  -Reinforce importance of medical compliance.  Especially in the setting of current cardiovascular health and his extensive cardiac history.    This patient has been examined wearing appropriate Personal Protective Equipment 08/05/21      Signature: Electronically signed by WHIT Sampson,  08/06/21, 4:31 AM EDT.

## 2021-08-06 NOTE — CASE MANAGEMENT/SOCIAL WORK
Continued Stay Note  SUGAR Aguiar     Patient Name: Yogi Tucker  MRN: [id number]  Today's Date: 8/6/2021    Admit Date: 8/3/2021    Discharge Plan     Row Name 08/06/21 1321       Plan    Plan  Anticipate routine home. May require walking oximetry prior to d/c.    Plan Comments  DC barriers: cardiac cath today        Phone communication or documentation only - no physical contact with patient or family.        Lindsay Woodward RN

## 2021-08-06 NOTE — PROGRESS NOTES
Referring Provider: Hospitalist    Reason for follow-up: Shortness of breath, congestive heart failure, non-STEMI     Patient Care Team:  Mey Cox as PCP - General    Subjective .  Patient is feeling better with less shortness of breath but no chest pain    Objective  Lying in bed comfortably     Review of Systems   Constitutional: Negative for fever and malaise/fatigue.   HENT: Negative for ear pain and nosebleeds.    Eyes: Negative for blurred vision and double vision.   Cardiovascular: Negative for chest pain, dyspnea on exertion and palpitations.   Respiratory: Positive for shortness of breath. Negative for cough.    Skin: Negative for rash.   Musculoskeletal: Negative for joint pain.   Gastrointestinal: Negative for abdominal pain, nausea and vomiting.   Neurological: Negative for focal weakness and headaches.   Psychiatric/Behavioral: Negative for depression. The patient is not nervous/anxious.    All other systems reviewed and are negative.      Shellfish allergy    Scheduled Meds:aspirin, 81 mg, Oral, Daily  atorvastatin, 40 mg, Oral, Nightly  fentaNYL citrate (PF), 25 mcg, Intravenous, Once  insulin lispro, 0-14 Units, Subcutaneous, Q6H  midazolam, 1 mg, Intravenous, Once      Continuous Infusions:heparin, 9.26 Units/kg/hr, Last Rate: 20.26 Units/kg/hr (08/06/21 1555)  sodium chloride, 100 mL/hr, Last Rate: 100 mL/hr (08/06/21 0816)  sodium chloride, 1-3 mL/kg/hr      PRN Meds:.•  acetaminophen **OR** acetaminophen  •  aluminum-magnesium hydroxide-simethicone  •  dextrose  •  dextrose  •  glucagon (human recombinant)  •  heparin  •  heparin  •  insulin lispro **AND** insulin lispro  •  nitroglycerin  •  ondansetron **OR** ondansetron        VITAL SIGNS  Vitals:    08/05/21 1825 08/05/21 2156 08/06/21 0511 08/06/21 0814   BP: 99/77 93/66 94/67 113/75   BP Location:  Right arm Right arm Left arm   Patient Position:  Sitting Sitting Sitting   Pulse: 103 103 58 105   Resp:  22 18 17   Temp:  97.5 °F (36.4  "°C) 97.8 °F (36.6 °C)    TempSrc:  Oral Oral    SpO2: 99% 98% 98% 96%   Weight:   117 kg (258 lb 6.1 oz)    Height:           Flowsheet Rows      First Filed Value   Admission Height  182.9 cm (72\") Documented at 08/03/2021 1345   Admission Weight  108 kg (237 lb 7 oz) Documented at 08/03/2021 1345           TELEMETRY: Sinus rhythm nonspecific ST segment abnormality    Physical Exam:  Constitutional:       Appearance: Well-developed.   Eyes:      General: No scleral icterus.     Conjunctiva/sclera: Conjunctivae normal.      Pupils: Pupils are equal, round, and reactive to light.   HENT:      Head: Normocephalic and atraumatic.   Neck:      Vascular: No carotid bruit or JVD.   Pulmonary:      Effort: Pulmonary effort is normal.      Breath sounds: Normal breath sounds. No wheezing. No rales.   Cardiovascular:      Normal rate. Regular rhythm.      Murmurs: There is a systolic murmur.   Pulses:     Intact distal pulses.   Abdominal:      General: Bowel sounds are normal.      Palpations: Abdomen is soft.   Musculoskeletal: Normal range of motion.      Cervical back: Normal range of motion and neck supple. Skin:     General: Skin is warm and dry.      Findings: No rash.   Neurological:      Mental Status: Alert.      Comments: No focal deficits          Results Review:   I reviewed the patient's new clinical results.  Lab Results (last 24 hours)     Procedure Component Value Units Date/Time    Troponin [[id number]]  (Abnormal) Collected: 08/06/21 1454    Specimen: Blood Updated: 08/06/21 1531     Troponin T 4.070 ng/mL     Narrative:      Troponin T Reference Range:  <= 0.03 ng/mL-   Negative for AMI  >0.03 ng/mL-     Abnormal for myocardial necrosis.  Clinicians would have to utilize clinical acumen, EKG, Troponin and serial changes to determine if it is an Acute Myocardial Infarction or myocardial injury due to an underlying chronic condition.       Results may be falsely decreased if patient taking Biotin.      aPTT " [[id number]]  (Abnormal) Collected: 08/06/21 1454    Specimen: Blood Updated: 08/06/21 1520     PTT 49.5 seconds     Blood Culture - Blood, Arm, Right [[id number]] Collected: 08/03/21 1444    Specimen: Blood from Arm, Right Updated: 08/06/21 1500     Blood Culture No growth at 3 days    Blood Culture - Blood, Arm, Right [[id number]] Collected: 08/03/21 1425    Specimen: Blood from Arm, Right Updated: 08/06/21 1430     Blood Culture No growth at 3 days    POC Glucose Once [[id number]]  (Abnormal) Collected: 08/06/21 1126    Specimen: Blood Updated: 08/06/21 1127     Glucose 153 mg/dL      Comment: Serial Number: [id number]:  [id number]       aPTT [[id number]]  (Abnormal) Collected: 08/06/21 0741    Specimen: Blood Updated: 08/06/21 0815     PTT 52.6 seconds     Troponin [[id number]]  (Abnormal) Collected: 08/06/21 0553    Specimen: Blood Updated: 08/06/21 0652     Troponin T 4.300 ng/mL     Narrative:      Troponin T Reference Range:  <= 0.03 ng/mL-   Negative for AMI  >0.03 ng/mL-     Abnormal for myocardial necrosis.  Clinicians would have to utilize clinical acumen, EKG, Troponin and serial changes to determine if it is an Acute Myocardial Infarction or myocardial injury due to an underlying chronic condition.       Results may be falsely decreased if patient taking Biotin.      aPTT [[id number]]  (Abnormal) Collected: 08/06/21 0553    Specimen: Blood Updated: 08/06/21 0634     PTT 50.2 seconds     CBC & Differential [[id number]]  (Abnormal) Collected: 08/06/21 0553    Specimen: Blood Updated: 08/06/21 0628    Narrative:      The following orders were created for panel order CBC & Differential.  Procedure                               Abnormality         Status                     ---------                               -----------         ------                     CBC Auto Differential[[id number]]        Abnormal            Final result                 Please view results for these tests on the individual orders.     CBC Auto Differential [[id number]]  (Abnormal) Collected: 08/06/21 0553    Specimen: Blood Updated: 08/06/21 0628     WBC 10.60 10*3/mm3      RBC 4.38 10*6/mm3      Hemoglobin 13.1 g/dL      Hematocrit 39.8 %      MCV 90.9 fL      MCH 29.9 pg      MCHC 32.9 g/dL      RDW 14.3 %      RDW-SD 45.9 fl      MPV 9.8 fL      Platelets 173 10*3/mm3      Neutrophil % 68.7 %      Lymphocyte % 18.9 %      Monocyte % 11.7 %      Eosinophil % 0.2 %      Basophil % 0.5 %      Neutrophils, Absolute 7.30 10*3/mm3      Lymphocytes, Absolute 2.00 10*3/mm3      Monocytes, Absolute 1.20 10*3/mm3      Eosinophils, Absolute 0.00 10*3/mm3      Basophils, Absolute 0.10 10*3/mm3      nRBC 0.1 /100 WBC     POC Glucose Once [[id number]]  (Abnormal) Collected: 08/06/21 0603    Specimen: Blood Updated: 08/06/21 0608     Glucose 139 mg/dL      Comment: Serial Number: [id number]:  [id number]       Troponin [[id number]]  (Abnormal) Collected: 08/06/21 0012    Specimen: Blood Updated: 08/06/21 0058     Troponin T 4.690 ng/mL     Narrative:      Troponin T Reference Range:  <= 0.03 ng/mL-   Negative for AMI  >0.03 ng/mL-     Abnormal for myocardial necrosis.  Clinicians would have to utilize clinical acumen, EKG, Troponin and serial changes to determine if it is an Acute Myocardial Infarction or myocardial injury due to an underlying chronic condition.       Results may be falsely decreased if patient taking Biotin.      Comprehensive Metabolic Panel [[id number]]  (Abnormal) Collected: 08/06/21 0012    Specimen: Blood Updated: 08/06/21 0056     Glucose 190 mg/dL      BUN 39 mg/dL      Creatinine 1.79 mg/dL      Sodium 131 mmol/L      Potassium 4.9 mmol/L      Chloride 99 mmol/L      CO2 21.0 mmol/L      Calcium 8.4 mg/dL      Total Protein 6.5 g/dL      Albumin 3.00 g/dL      ALT (SGPT) 113 U/L      AST (SGOT) 96 U/L      Alkaline Phosphatase 105 U/L      Total Bilirubin 0.5 mg/dL      eGFR Non African Amer 42 mL/min/1.73      Globulin 3.5  gm/dL      A/G Ratio 0.9 g/dL      BUN/Creatinine Ratio 21.8     Anion Gap 11.0 mmol/L     Narrative:      GFR Normal >60  Chronic Kidney Disease <60  Kidney Failure <15      Phosphorus [[id number]]  (Normal) Collected: 08/06/21 0012    Specimen: Blood Updated: 08/06/21 0056     Phosphorus 3.1 mg/dL     Magnesium [[id number]]  (Normal) Collected: 08/06/21 0012    Specimen: Blood Updated: 08/06/21 0056     Magnesium 2.3 mg/dL     aPTT [[id number]]  (Abnormal) Collected: 08/06/21 0012    Specimen: Blood Updated: 08/06/21 0048     PTT 45.8 seconds     POC Glucose Once [[id number]]  (Abnormal) Collected: 08/06/21 0014    Specimen: Blood Updated: 08/06/21 0021     Glucose 166 mg/dL      Comment: Serial Number: [id number]:  [id number]             Imaging Results (Last 24 Hours)     ** No results found for the last 24 hours. **          EKG      I personally viewed and interpreted the patient's EKG/Telemetry data:    ECHOCARDIOGRAM:    STRESS MYOVIEW:    CARDIAC CATHETERIZATION:    OTHER:         Assessment/Plan     Principal Problem:    NSTEMI (non-ST elevated myocardial infarction) (CMS/[facility])  Active Problems:    Cardiomyopathy, ischemic    Coronary artery disease    Heart failure (CMS/[facility])    Hyperlipidemia    Hypertensive disorder    Presence of automatic implantable cardioverter-defibrillator    Cardiogenic shock (CMS/[facility])    Severe sepsis, not felt to be septic shock    Type 2 diabetes mellitus with hyperglycemia (CMS/[facility])    SATHISH (acute kidney injury) (CMS/[facility])    H/O noncompliance with medical treatment, presenting hazards to health       Patient presented with chest pain or shortness of breath and had non-STEMI and was on heparin  Patient also has congestive heart failure and his echocardiogram showed a EF of about 10%  Patient has an ICD in the past and is working very well  Patient has acute renal failure and hence is followed by nephrologist  Patient is having cardiac catheterization performed today  Discussed  with patient about procedure risks and benefits  Patient also had severe mitral regurgitation and tricuspid regurgitation on the echocardiogram  Patient sugar levels and lipid levels are followed by the primary care doctor  Patient blood pressure still borderline and is off vasopressors  We'll start him on low-dose beta-blockers once the blood pressure stable  Further treatment based on cardiac catheterization findings    I discussed the patients findings and my recommendations with patient and nurse    Carl Appiah MD  08/06/21  16:48 EDT

## 2021-08-06 NOTE — PLAN OF CARE
Goal Outcome Evaluation:           Progress: no change  Outcome Summary: Patient without complaints of pain thus far on shift. Remains of heparin gtt. Pt scheduled for cardiac cath this evening. NS infusing. Will continue to monitor.

## 2021-08-06 NOTE — PROGRESS NOTES
[facility] Medicine Services Daily Progress Note    Patient Name: Yogi Tucker  : 1980  MRN: [id number]  Primary Care Physician:  Mey Cox  Date of admission: 8/3/2021      Subjective      Chief Complaint: Patient denies for any chest pain, no short of breath.      Patient Reports     ROS     Objective      Vitals:   Temp:  [97.5 °F (36.4 °C)-98.5 °F (36.9 °C)] 97.8 °F (36.6 °C)  Heart Rate:  [] 105  Resp:  [17-25] 17  BP: ()/(66-77) 113/75  Flow (L/min):  [2] 2    Physical Exam  Vitals and nursing note reviewed.   Constitutional:       General: He is not in acute distress.     Appearance: Normal appearance. He is well-developed. He is not ill-appearing, toxic-appearing or diaphoretic.   HENT:      Head: Normocephalic and atraumatic.      Right Ear: Ear canal and external ear normal.      Left Ear: Ear canal and external ear normal.      Nose: Nose normal. No congestion or rhinorrhea.      Mouth/Throat:      Mouth: Mucous membranes are moist.      Pharynx: No oropharyngeal exudate.   Eyes:      General: No scleral icterus.        Right eye: No discharge.         Left eye: No discharge.      Extraocular Movements: Extraocular movements intact.      Conjunctiva/sclera: Conjunctivae normal.      Pupils: Pupils are equal, round, and reactive to light.   Neck:      Thyroid: No thyromegaly.      Vascular: No carotid bruit or JVD.      Trachea: No tracheal deviation.   Cardiovascular:      Rate and Rhythm: Normal rate and regular rhythm.      Pulses: Normal pulses.      Heart sounds: Normal heart sounds. No murmur heard.   No friction rub. No gallop.    Pulmonary:      Effort: Pulmonary effort is normal. No respiratory distress.      Breath sounds: Normal breath sounds. No stridor. No wheezing, rhonchi or rales.   Chest:      Chest wall: No tenderness.   Abdominal:      General: Bowel sounds are normal. There is no distension.      Palpations: Abdomen is soft. There is no  mass.      Tenderness: There is no abdominal tenderness. There is no guarding or rebound.      Hernia: No hernia is present.   Musculoskeletal:         General: No swelling, tenderness, deformity or signs of injury. Normal range of motion.      Cervical back: Normal range of motion and neck supple. No rigidity. No muscular tenderness.      Right lower leg: No edema.      Left lower leg: No edema.   Lymphadenopathy:      Cervical: No cervical adenopathy.   Skin:     General: Skin is warm and dry.      Coloration: Skin is not jaundiced or pale.      Findings: No bruising, erythema or rash.   Neurological:      General: No focal deficit present.      Mental Status: He is alert and oriented to person, place, and time. Mental status is at baseline.      Cranial Nerves: No cranial nerve deficit.      Sensory: No sensory deficit.      Motor: No weakness or abnormal muscle tone.      Coordination: Coordination normal.   Psychiatric:         Mood and Affect: Mood normal.         Behavior: Behavior normal.         Thought Content: Thought content normal.         Judgment: Judgment normal.             Result Review    Result Review:  I have personally reviewed the results from the time of this admission to 8/6/2021 13:31 EDT and agree with these findings:  [x]  Laboratory  [x]  Microbiology  [x]  Radiology  []  EKG/Telemetry   []  Cardiology/Vascular   []  Pathology  []  Old records  []  Other:  Most notable findings include:           Assessment/Plan      Brief Patient Summary:  Yogi Tucker is a 40 y.o. male who     aspirin, 81 mg, Oral, Daily  atorvastatin, 40 mg, Oral, Nightly  insulin lispro, 0-14 Units, Subcutaneous, Q6H       heparin, 9.26 Units/kg/hr, Last Rate: 19.26 Units/kg/hr (08/06/21 0834)  sodium chloride, 100 mL/hr, Last Rate: 100 mL/hr (08/06/21 0816)         Active Hospital Problems:  Active Hospital Problems    Diagnosis    • **NSTEMI (non-ST elevated myocardial infarction) (CMS/[facility])    • Cardiogenic  shock (CMS/[facility])    • Severe sepsis, not felt to be septic shock    • SATHISH (acute kidney injury) (CMS/[facility])    • H/O noncompliance with medical treatment, presenting hazards to health    • Type 2 diabetes mellitus with hyperglycemia (CMS/[facility])    • Coronary artery disease    • Hypertensive disorder    • Hyperlipidemia    • Cardiomyopathy, ischemic    • Presence of automatic implantable cardioverter-defibrillator    • Heart failure (CMS/[facility])      A/P     NSTEMI (non-ST elevated myocardial infarction) Cardiomyopathy, ischemic  -Cardiology consulted, plan for cardiac cath noted.  -Cardiac cath scheduled for August 6, 2021  -Patient currently on heparin drip  -Currently troponins trending downward  -EKG reviewed     - Likely reactive leucocytosis.  Cardiogenic shock  -Patient was given fluid bolus in ED.    -Continuous IV fluids initiated  -Dopamine drip was initiated to maintain MAP >65mmhg has since weaned off  -Lactic acid was 1.9  -Blood cultures are pending  -Negative UA  -Procalcitonin 0.36, CRP 14-42  -Antibiotics were subsequently discontinued  -Continue to monitor off antibiotics.     Ischemic cardiomyopathy/systolic heart failure with reduced ejection/presence of automatic implantable cardioverter-defibrillator   -Ischemic cardiomyopathy/systolic heart failure likely due to uncontrolled hypertension.  -Heart failure not  to be in exacerbation  -proBNP on August 3, 2021 was 13,350  -Last ejection fraction was 35% in January 2018 echocardiogram from August 3, 2021 shows left ejection fraction less than 20% with a right ventricular cavity mildly dilated, severe mitral valve regurgitation, moderate tricuspid valve regurgitation, with no pericardial effusions noted.  -As stated above cardiology consulted     Elevated LFTs  Likely secondary to cardiogenic shock  -ALT 44,   -  -Continue to monitor liver function     Hyperlipidemia  -Patient on atorvastatin at home  -Hold until liver enzymes normalize  -Last lipid  panel shows total cholesterol 130, HDL cholesterol 51, LDL cholesterol 56, VLDL cholesterol 23, triglycerides 133, LDL-HDL ratio 1.03  -Encourage lifestyle modifications to include reduced cholesterol heart healthy diet.     Hypertensive disorder  -Patient currentl borderline hypotensive requiring support to maintain adequate perfusion.  -May continue Coreg per cardiology recommendations when clinically appropriate.      Type 2 diabetes mellitus with hyperglycemia   -Patient has been noncompliant with medical management  -Hemoglobin A1c 7.5  -Holding home Metformin and glimepiride during hospitalization due to risk of acidosis.  As well as better glycemic control with sliding scale insulin during hospitalization  -Continue sliding scale        SATHISH (acute kidney injury) (CMS/HCC)  -Likely due to cardiogenic shock/hypotension  -IV fluids initiated on admission  -Avoid nephrotoxic agents  -Avoid hypotension  -Creatinine 1.76 today, BUN 40 today  -Nephrology following      H/O noncompliance with medical treatment, presenting hazards to health  -Reinforce importance of medical compliance.  Especially in the setting of current cardiovascular health and his extensive cardiac history.     This patient has been examined wearing appropriate Personal Protective Equipment 08/05/21      DVT prophylaxis:  Medical DVT prophylaxis orders are present.    CODE STATUS:    Code Status: CPR  Medical Interventions (Level of Support Prior to Arrest): Full      Disposition:  I expect patient to be discharged once cleared by cardiology service..    This patient has been examined wearing appropriate Personal Protective Equipment and discussed with hospital infection control department. 08/06/21      Electronically signed by Seven Mcneil MD, 08/06/21, 13:31 EDT.  [facility] Hospitalist Team

## 2021-08-07 LAB
ALBUMIN SERPL-MCNC: 3.2 G/DL (ref 3.5–5.2)
ALBUMIN/GLOB SERPL: 0.9 G/DL
ALP SERPL-CCNC: 99 U/L (ref 39–117)
ALT SERPL W P-5'-P-CCNC: 175 U/L (ref 1–41)
ANION GAP SERPL CALCULATED.3IONS-SCNC: 13 MMOL/L (ref 5–15)
APTT PPP: 27.4 SECONDS (ref 61–76.5)
AST SERPL-CCNC: 90 U/L (ref 1–40)
BASOPHILS # BLD AUTO: 0 10*3/MM3 (ref 0–0.2)
BASOPHILS NFR BLD AUTO: 0.1 % (ref 0–1.5)
BILIRUB SERPL-MCNC: 0.3 MG/DL (ref 0–1.2)
BUN SERPL-MCNC: 47 MG/DL (ref 6–20)
BUN/CREAT SERPL: 28.1 (ref 7–25)
CALCIUM SPEC-SCNC: 8.6 MG/DL (ref 8.6–10.5)
CHLORIDE SERPL-SCNC: 101 MMOL/L (ref 98–107)
CO2 SERPL-SCNC: 21 MMOL/L (ref 22–29)
CREAT SERPL-MCNC: 1.67 MG/DL (ref 0.76–1.27)
DEPRECATED RDW RBC AUTO: 46.4 FL (ref 37–54)
EOSINOPHIL # BLD AUTO: 0 10*3/MM3 (ref 0–0.4)
EOSINOPHIL NFR BLD AUTO: 0 % (ref 0.3–6.2)
ERYTHROCYTE [DISTWIDTH] IN BLOOD BY AUTOMATED COUNT: 14.4 % (ref 12.3–15.4)
GFR SERPL CREATININE-BSD FRML MDRD: 46 ML/MIN/1.73
GLOBULIN UR ELPH-MCNC: 3.4 GM/DL
GLUCOSE BLDC GLUCOMTR-MCNC: 248 MG/DL (ref 70–105)
GLUCOSE BLDC GLUCOMTR-MCNC: 286 MG/DL (ref 70–105)
GLUCOSE SERPL-MCNC: 218 MG/DL (ref 65–99)
HCT VFR BLD AUTO: 38.8 % (ref 37.5–51)
HGB BLD-MCNC: 12.8 G/DL (ref 13–17.7)
LYMPHOCYTES # BLD AUTO: 0.6 10*3/MM3 (ref 0.7–3.1)
LYMPHOCYTES NFR BLD AUTO: 9.7 % (ref 19.6–45.3)
MAGNESIUM SERPL-MCNC: 2.7 MG/DL (ref 1.6–2.6)
MCH RBC QN AUTO: 30.3 PG (ref 26.6–33)
MCHC RBC AUTO-ENTMCNC: 33 G/DL (ref 31.5–35.7)
MCV RBC AUTO: 91.6 FL (ref 79–97)
MONOCYTES # BLD AUTO: 0.4 10*3/MM3 (ref 0.1–0.9)
MONOCYTES NFR BLD AUTO: 6.1 % (ref 5–12)
NEUTROPHILS NFR BLD AUTO: 5.5 10*3/MM3 (ref 1.7–7)
NEUTROPHILS NFR BLD AUTO: 84.1 % (ref 42.7–76)
NRBC BLD AUTO-RTO: 0.1 /100 WBC (ref 0–0.2)
PHOSPHATE SERPL-MCNC: 4.8 MG/DL (ref 2.5–4.5)
PLATELET # BLD AUTO: 199 10*3/MM3 (ref 140–450)
PMV BLD AUTO: 9.7 FL (ref 6–12)
POTASSIUM SERPL-SCNC: 5.3 MMOL/L (ref 3.5–5.2)
PROT SERPL-MCNC: 6.6 G/DL (ref 6–8.5)
RBC # BLD AUTO: 4.24 10*6/MM3 (ref 4.14–5.8)
SODIUM SERPL-SCNC: 135 MMOL/L (ref 136–145)
TROPONIN T SERPL-MCNC: 2.48 NG/ML (ref 0–0.03)
TROPONIN T SERPL-MCNC: 2.66 NG/ML (ref 0–0.03)
TROPONIN T SERPL-MCNC: 2.67 NG/ML (ref 0–0.03)
TROPONIN T SERPL-MCNC: 2.81 NG/ML (ref 0–0.03)
WBC # BLD AUTO: 6.5 10*3/MM3 (ref 3.4–10.8)

## 2021-08-07 PROCEDURE — 25010000002 HEPARIN (PORCINE) PER 1000 UNITS: Performed by: NURSE PRACTITIONER

## 2021-08-07 PROCEDURE — 84100 ASSAY OF PHOSPHORUS: CPT | Performed by: INTERNAL MEDICINE

## 2021-08-07 PROCEDURE — 83735 ASSAY OF MAGNESIUM: CPT | Performed by: INTERNAL MEDICINE

## 2021-08-07 PROCEDURE — 84484 ASSAY OF TROPONIN QUANT: CPT | Performed by: NURSE PRACTITIONER

## 2021-08-07 PROCEDURE — 63710000001 INSULIN LISPRO (HUMAN) PER 5 UNITS: Performed by: NURSE PRACTITIONER

## 2021-08-07 PROCEDURE — 63710000001 INSULIN LISPRO (HUMAN) PER 5 UNITS: Performed by: INTERNAL MEDICINE

## 2021-08-07 PROCEDURE — 82962 GLUCOSE BLOOD TEST: CPT

## 2021-08-07 PROCEDURE — 36415 COLL VENOUS BLD VENIPUNCTURE: CPT | Performed by: NURSE PRACTITIONER

## 2021-08-07 PROCEDURE — 99232 SBSQ HOSP IP/OBS MODERATE 35: CPT | Performed by: HOSPITALIST

## 2021-08-07 PROCEDURE — 99232 SBSQ HOSP IP/OBS MODERATE 35: CPT | Performed by: INTERNAL MEDICINE

## 2021-08-07 PROCEDURE — 80053 COMPREHEN METABOLIC PANEL: CPT | Performed by: INTERNAL MEDICINE

## 2021-08-07 PROCEDURE — 63710000001 INSULIN GLARGINE PER 5 UNITS: Performed by: NURSE PRACTITIONER

## 2021-08-07 PROCEDURE — 85025 COMPLETE CBC W/AUTO DIFF WBC: CPT | Performed by: EMERGENCY MEDICINE

## 2021-08-07 PROCEDURE — 85730 THROMBOPLASTIN TIME PARTIAL: CPT | Performed by: INTERNAL MEDICINE

## 2021-08-07 RX ORDER — INSULIN GLARGINE 100 [IU]/ML
10 INJECTION, SOLUTION SUBCUTANEOUS NIGHTLY
Status: DISCONTINUED | OUTPATIENT
Start: 2021-08-07 | End: 2021-08-08

## 2021-08-07 RX ORDER — INSULIN LISPRO 100 [IU]/ML
0-14 INJECTION, SOLUTION INTRAVENOUS; SUBCUTANEOUS
Status: DISCONTINUED | OUTPATIENT
Start: 2021-08-07 | End: 2021-08-09 | Stop reason: HOSPADM

## 2021-08-07 RX ORDER — HEPARIN SODIUM 5000 [USP'U]/ML
5000 INJECTION, SOLUTION INTRAVENOUS; SUBCUTANEOUS EVERY 8 HOURS SCHEDULED
Status: DISCONTINUED | OUTPATIENT
Start: 2021-08-07 | End: 2021-08-09 | Stop reason: HOSPADM

## 2021-08-07 RX ORDER — INSULIN LISPRO 100 [IU]/ML
0-14 INJECTION, SOLUTION INTRAVENOUS; SUBCUTANEOUS
Status: DISCONTINUED | OUTPATIENT
Start: 2021-08-07 | End: 2021-08-07

## 2021-08-07 RX ADMIN — ATORVASTATIN CALCIUM 40 MG: 40 TABLET, FILM COATED ORAL at 19:24

## 2021-08-07 RX ADMIN — INSULIN GLARGINE 10 UNITS: 100 INJECTION, SOLUTION SUBCUTANEOUS at 19:31

## 2021-08-07 RX ADMIN — INSULIN LISPRO 8 UNITS: 100 INJECTION, SOLUTION INTRAVENOUS; SUBCUTANEOUS at 19:32

## 2021-08-07 RX ADMIN — INSULIN LISPRO 5 UNITS: 100 INJECTION, SOLUTION INTRAVENOUS; SUBCUTANEOUS at 05:33

## 2021-08-07 RX ADMIN — HEPARIN SODIUM 5000 UNITS: 5000 INJECTION INTRAVENOUS; SUBCUTANEOUS at 21:25

## 2021-08-07 RX ADMIN — ASPIRIN 81 MG: 81 TABLET, COATED ORAL at 10:11

## 2021-08-07 RX ADMIN — HEPARIN SODIUM 5000 UNITS: 5000 INJECTION INTRAVENOUS; SUBCUTANEOUS at 14:23

## 2021-08-07 RX ADMIN — INSULIN LISPRO 5 UNITS: 100 INJECTION, SOLUTION INTRAVENOUS; SUBCUTANEOUS at 13:30

## 2021-08-07 NOTE — PROGRESS NOTES
[facility] Progress Note      Patient Name: Yogi Tucker  : 1980  MRN: [id number]  Primary Care Physician:  Mey Cox  Date of admission: 8/3/2021    Subjective     Interval History:     Wife at bedside  Denies any chest pain, palpitation diaphoresis  Urinary spontaneously  Mild dyspnea on exertion    Review of Systems:   As noted above    Objective     Vitals:   Temp:  [95.8 °F (35.4 °C)-98.3 °F (36.8 °C)] 98.3 °F (36.8 °C)  Heart Rate:  [] 98  Resp:  [19-30] 19  BP: (101-124)/(69-95) 104/80  Flow (L/min):  [2] 2    Intake/Output Summary (Last 24 hours) at 2021 1707  Last data filed at 2021 1400  Gross per 24 hour   Intake 1290.61 ml   Output --   Net 1290.61 ml       Physical Exam:    General Appearance: alert, oriented x 3, no acute distress   Skin: warm and dry  HEENT: oral mucosa normal, nonicteric sclera  Neck: supple, no JVD  Lungs: CTA  Heart: RRR, soft systolic murmur grade 3  Abdomen: soft, nontender, nondistended  : no palpable bladder  Extremities: +  edema, cyanosis or clubbing  Neuro: normal speech and mental status     Scheduled Meds:     aspirin, 81 mg, Oral, Daily  atorvastatin, 40 mg, Oral, Nightly  heparin (porcine), 5,000 Units, Subcutaneous, Q8H  insulin glargine, 10 Units, Subcutaneous, Nightly  insulin lispro, 0-14 Units, Subcutaneous, 4x Daily With Meals & Nightly   And  insulin lispro, 0-14 Units, Subcutaneous, 4x Daily With Meals & Nightly      IV Meds:   sodium chloride, 100 mL/hr, Last Rate: 100 mL/hr (21 5095)        Results Reviewed:   I have personally reviewed the results from the time of this admission to 2021 17:07 EDT     Results from last 7 days   Lab Units 21  0355 21  0012 21  1725 21  0450 21  0450   SODIUM mmol/L 135* 131* 134*   < > 136   POTASSIUM mmol/L 5.3* 4.9 4.9   < > 5.0   CHLORIDE mmol/L 101 99 101   < > 101   CO2 mmol/L 21.0* 21.0* 23.0   < > 24.0   BUN mg/dL 47*  39* 40*   < > 37*   CREATININE mg/dL 1.67* 1.79* 1.76*   < > 1.80*   CALCIUM mg/dL 8.6 8.4* 8.6   < > 8.3*   BILIRUBIN mg/dL 0.3 0.5  --   --  0.6   ALK PHOS U/L 99 105  --   --  100   ALT (SGPT) U/L 175* 113*  --   --  105*   AST (SGOT) U/L 90* 96*  --   --  151*   GLUCOSE mg/dL 218* 190* 136*   < > 168*    < > = values in this interval not displayed.       Estimated Creatinine Clearance: 75.6 mL/min (A) (by C-G formula based on SCr of 1.67 mg/dL (H)).    Results from last 7 days   Lab Units 08/07/21  0355 08/06/21  0012 08/05/21  0450   MAGNESIUM mg/dL 2.7* 2.3 2.1   PHOSPHORUS mg/dL 4.8* 3.1 3.4       Results from last 7 days   Lab Units 08/05/21  0450   URIC ACID mg/dL 8.8*       Results from last 7 days   Lab Units 08/07/21  0355 08/06/21  0553 08/05/21  1725 08/05/21  0450 08/04/21  0502   WBC 10*3/mm3 6.50 10.60 11.90* 12.30* 11.60*   HEMOGLOBIN g/dL 12.8* 13.1 12.9* 13.4 13.2   PLATELETS 10*3/mm3 199 173 194 171 154       Results from last 7 days   Lab Units 08/03/21  1849   INR  1.01       Assessment / Plan       ASSESSMENT:    -SATHISH vs Chronic kidney disease stage 3. Unknown baseline  Secondary to cardiorenal syndrome    .Creatinine trend 1.6-1.8 during admission     . Urinalysis glucosuria with proteinuria. Images studies renal ultrasound right kidney measuring 11 cm.  Left kidney measuring 11.7 cm no hydronephrosis     . Will follow closely      . Avoid nephrotoxins      . Keep MAP > 65 . Avoid nephrotoxins  -Non-STEMI with dilated cardiomyopathy with coronary disease status post cardioverter defibrillator admitted with cardiogenic shock.  With severe MR patient underwent cardiac cath none amendable for percutaneous intervention.  Awaiting transfer for transfer evaluation  -Diabetes Mellitus type 2. On insulin regimen / hypoglycemic regimen .Hypoglycemic protocol . POC glucose 4 x day .Diabetic diet  -Hypotension secondary to dilated cardiomyopathy.  Closely monitor  -Borderline hyperkalemia we will place  the patient on renal diet low potassium    PLAN:  -Patient awaiting transfer, for further evaluation by transplant team  -Renal function currently stable  -Follow renal function closely  -Avoid nephrotoxins  -Adjust medications to GFR      Thank you for involving us in the care of Yogi Tucker.  Please feel free to call with any questions.    Spencer Angel MD  08/07/21  17:07 EDT    [facility]  [phone]      Much of this encounter note is an electronic transcription/translation of spoken language to printed text. The electronic translation of spoken language may permit erroneous, or at times, nonsensical words or phrases to be inadvertently transcribed; Although I have reviewed the note for such errors, some may still exist.

## 2021-08-07 NOTE — PROGRESS NOTES
CC--- severe multivessel coronary artery disease  Severe LV dysfunction and severe MR and TR      Sub--post cardiac catheterization with severe multivessel disease being transferred to ICU for transplant evaluation  Denies any angina or dyspnea        Past Medical History:   Diagnosis Date   • Coronary artery disease    • Hyperlipidemia    • Hypertension      Past Surgical History:   Procedure Laterality Date   • CORONARY ARTERY BYPASS GRAFT     • LEFT HEART CATH       Family History   Problem Relation Age of Onset   • No Known Problems Mother    • No Known Problems Father      Social History     Tobacco Use   • Smoking status: Never Smoker   • Smokeless tobacco: Former User     Types: Chew   Substance Use Topics   • Alcohol use: Not on file   • Drug use: Not on file     Review of Systems   General:  positive for fatigue and tiredness  Eyes: No redness  Cardiovascular: No chest pain, no palpitations  Respiratory:   positive for class 3 shortness of breath  Gastrointestinal: No nausea or vomiting, bleeding  Genitourinary: no hematuria or dysuria  Musculoskeletal: No arthralgia or myalgia  Skin: No rash  Neurologic: No numbness, tingling, syncope  Hematologic/Lymphatic: No abnormal bleeding      Physical Exam  VITALS REVIEWED--blood pressure 110/70 pulse rate is 82 patient afebrile respiration 12 times a minute    General:      well developed, well nourished, in no acute distress.    Head:      normocephalic and atraumatic.    Eyes:      PERRL/EOM intact, conjunctiva and sclera clear with out nystagmus.    Neck:      no masses, thyromegaly,  trachea central with normal respiratory effort and PMI displaced laterally  Lungs:      clear bilaterally to auscultation.    Heart:       Sinus rhythm with a gallop without any rubs   Msk:      no deformity or scoliosis noted of thoracic or lumbar spine.    Pulses:      pulses normal in all 4 extremities.    Extremities:       no cyanosis or clubbing--trace left pedal edema and  trace right pedal edema.    Neurologic:      no focal deficits.   alert oriented x3  Skin:      intact without lesions or rashes.    Psych:      alert and cooperative; normal mood and affect; normal attention span and concentration.          CBC    Results from last 7 days   Lab Units 08/07/21  0355 08/06/21  0553 08/05/21  1725 08/05/21  0450 08/04/21  0502 08/03/21  1849 08/03/21  1425   WBC 10*3/mm3 6.50 10.60 11.90* 12.30* 11.60* 14.50* 15.20*   HEMOGLOBIN g/dL 12.8* 13.1 12.9* 13.4 13.2 13.8 14.3   PLATELETS 10*3/mm3 199 173 194 171 154 181 211     BMP   Results from last 7 days   Lab Units 08/07/21  0355 08/06/21  0012 08/05/21  1725 08/05/21  0450 08/04/21  1225 08/04/21  0502 08/03/21  1425   SODIUM mmol/L 135* 131* 134* 136 137 137 135*   POTASSIUM mmol/L 5.3* 4.9 4.9 5.0 5.0 4.4 4.8   CHLORIDE mmol/L 101 99 101 101 101 101 98   CO2 mmol/L 21.0* 21.0* 23.0 24.0 23.0 25.0 26.0   BUN mg/dL 47* 39* 40* 37* 31* 29* 25*   CREATININE mg/dL 1.67* 1.79* 1.76* 1.80* 1.79* 1.72* 1.89*   GLUCOSE mg/dL 218* 190* 136* 168* 137* 152* 196*   MAGNESIUM mg/dL 2.7* 2.3  --  2.1  --  2.0 2.0   PHOSPHORUS mg/dL 4.8* 3.1  --  3.4  --  2.8 3.5     Infection   Results from last 7 days   Lab Units 08/03/21  1444 08/03/21  1425   BLOODCX  No growth at 3 days No growth at 3 days   PROCALCITONIN ng/mL  --  0.36*     CMP   Results from last 7 days   Lab Units 08/07/21  0355 08/06/21  0012 08/05/21  1725 08/05/21  0450 08/04/21  1225 08/04/21  0502 08/03/21  1425   SODIUM mmol/L 135* 131* 134* 136 137 137 135*   POTASSIUM mmol/L 5.3* 4.9 4.9 5.0 5.0 4.4 4.8   CHLORIDE mmol/L 101 99 101 101 101 101 98   CO2 mmol/L 21.0* 21.0* 23.0 24.0 23.0 25.0 26.0   BUN mg/dL 47* 39* 40* 37* 31* 29* 25*   CREATININE mg/dL 1.67* 1.79* 1.76* 1.80* 1.79* 1.72* 1.89*   GLUCOSE mg/dL 218* 190* 136* 168* 137* 152* 196*   ALBUMIN g/dL 3.20* 3.00*  --  3.60  --  3.60 3.80   BILIRUBIN mg/dL 0.3 0.5  --  0.6  --  0.7 0.6   ALK PHOS U/L 99 105  --  100  --  96  95   AST (SGOT) U/L 90* 96*  --  151*  --  110* 126*   ALT (SGPT) U/L 175* 113*  --  105*  --  46* 44*         A/p      Severe multivessel coronary artery disease with no further targets  Severe left dysfunction  Severe MR  ICD in situ  Class III systolic heart failure chronic  Cardiorenal syndrome  Patient awaiting transfer to  for transplant evaluation  Subcu heparin for DVT prophylaxis  Continue supportive management  Clinically stable without angina      Electronically signed by Yospeh Tripathi MD, 08/07/21, 12:54 PM EDT.

## 2021-08-07 NOTE — PROGRESS NOTES
"PULMONARY CRITICAL CARE Progress  NOTE      PATIENT IDENTIFICATION:  Name: Yogi Tucker  MRN: [id number]  :  1980     Age: 40 y.o.  Sex: male    DATE OF Note:  2021   Referring Physician: Seven Mcneil MD                  Subjective:   No chest pain or SOA  Awaiting transfer to [facility] for transplant work-up  No further N/V  SR with BBB  No longer on heparin gtt  Ambulating around the room    Objective:  tMax 24 hrs: Temp (24hrs), Av.2 °F (36.2 °C), Min:95.8 °F (35.4 °C), Max:98.2 °F (36.8 °C)      Vitals Ranges:   Temp:  [95.8 °F (35.4 °C)-98.2 °F (36.8 °C)] 98.2 °F (36.8 °C)  Heart Rate:  [] 102  Resp:  [19-30] 19  BP: (101-123)/(72-95) 104/78    Intake and Output Last 3 Shifts:   I/O last 3 completed shifts:  In: 1290.6 [P.O.:840; I.V.:450.6]  Out: 600 [Urine:600]    Exam:  /78   Pulse 102   Temp 98.2 °F (36.8 °C)   Resp 19   Ht 182.9 cm (72\")   Wt 113 kg (248 lb 10.9 oz)   SpO2 98%   BMI 33.73 kg/m²     Constitutional:  Well developed, well nourished, no acute distress, non-toxic appearance   Eyes:  PERRL, conjunctiva normal, EOMI   HENT:  Atraumatic, external ears normal, nose normal. Neck-normal range of motion, no tenderness  Respiratory: Diminished bases, no rhonchi or wheezing, non-labored respirations without accessory muscle use  Cardiovascular: Sinus tachycardia, + systolic murmur, no gallops, no rubs   GI:  Soft, nondistended, normal bowel sounds, nontender, no rebound or guarding   : Deferred  Musculoskeletal:  No edema, no clubbing, no deformities  Integument:  Well hydrated, no rash   Neurologic:  Alert & oriented x 3, no lateralizing deficits  Psychiatric:  Speech and behavior appropriate         Medications:    Current Facility-Administered Medications:   •  acetaminophen (TYLENOL) tablet 650 mg, 650 mg, Oral, Q4H PRN **OR** acetaminophen (TYLENOL) suppository 650 mg, 650 mg, Rectal, Q4H PRN, Carl Appiah MD  •  aluminum-magnesium " hydroxide-simethicone (MAALOX MAX) 400-400-40 MG/5ML suspension 15 mL, 15 mL, Oral, Q6H PRN, Carl Appiah MD  •  aspirin EC tablet 81 mg, 81 mg, Oral, Daily, Carl Appiah MD, 81 mg at 08/07/21 1011  •  atorvastatin (LIPITOR) tablet 40 mg, 40 mg, Oral, Nightly, Carl Appiah MD, 40 mg at 08/06/21 2150  •  atropine sulfate injection 0.5 mg, 0.5 mg, Intravenous, Q5 Min PRN, Carl Appiah MD  •  dextrose (D50W) 25 g/ 50mL Intravenous Solution 25 g, 25 g, Intravenous, Q15 Min PRN, Carl Appiah MD  •  dextrose (GLUTOSE) oral gel 15 g, 15 g, Oral, Q15 Min PRN, Carl Appiah MD  •  glucagon (human recombinant) (GLUCAGEN DIAGNOSTIC) injection 1 mg, 1 mg, Subcutaneous, Q15 Min PRN, Carl Appiah MD  •  heparin 94608 units/250 mL (100 units/mL) in 0.45 % NaCl infusion, 9.26 Units/kg/hr, Intravenous, Titrated, Carl Appiah MD, Last Rate: 21.8 mL/hr at 08/06/21 1555, 20.26 Units/kg/hr at 08/06/21 1555  •  heparin bolus from bag 2,500 Units, 2,500 Units, Intravenous, Q6H PRN, Carl Appiah MD, 2,500 Units at 08/06/21 1558  •  heparin bolus from bag 5,000 Units, 5,000 Units, Intravenous, Q6H PRN, Carl Appiah MD, 5,000 Units at 08/04/21 0642  •  insulin lispro (ADMELOG) injection 0-14 Units, 0-14 Units, Subcutaneous, Q6H, 5 Units at 08/07/21 0533 **AND** insulin lispro (ADMELOG) injection 0-14 Units, 0-14 Units, Subcutaneous, PRN, Carl Appiah MD  •  nitroglycerin (NITROSTAT) SL tablet 0.4 mg, 0.4 mg, Sublingual, Q5 Min PRN, Carl Appiah MD  •  ondansetron (ZOFRAN) tablet 4 mg, 4 mg, Oral, Q6H PRN **OR** ondansetron (ZOFRAN) injection 4 mg, 4 mg, Intravenous, Q6H PRN, Carl Appiah MD, 4 mg at 08/06/21 0842  •  sodium chloride 0.9 % infusion 250 mL, 250 mL, Intravenous, Once PRN, Carl Appiah MD  •  sodium chloride 0.9 % infusion, 100 mL/hr, Intravenous, Continuous, Carl Appiah MD, Last Rate: 100 mL/hr at 08/06/21 2145, 100 mL/hr at 08/06/21 2145    Data Review:  All labs (24hrs):   Recent Results (from  the past 24 hour(s))   POC Glucose Once    Collection Time: 08/06/21 11:26 AM    Specimen: Blood   Result Value Ref Range    Glucose 153 (H) 70 - 105 mg/dL   Troponin    Collection Time: 08/06/21  2:54 PM    Specimen: Blood   Result Value Ref Range    Troponin T 4.070 (C) 0.000 - 0.030 ng/mL   aPTT    Collection Time: 08/06/21  2:54 PM    Specimen: Blood   Result Value Ref Range    PTT 49.5 (L) 61.0 - 76.5 seconds   POC Glucose Once    Collection Time: 08/06/21  5:50 PM    Specimen: Blood   Result Value Ref Range    Glucose 144 (H) 70 - 105 mg/dL   POC Activated Clotting Time    Collection Time: 08/06/21  6:42 PM    Specimen: Arterial Blood   Result Value Ref Range    Activated Clotting Time  98 89 - 137 Seconds   Troponin    Collection Time: 08/06/21  9:48 PM    Specimen: Blood   Result Value Ref Range    Troponin T 3.280 (C) 0.000 - 0.030 ng/mL   aPTT    Collection Time: 08/06/21  9:48 PM    Specimen: Blood   Result Value Ref Range    PTT 29.6 (L) 61.0 - 76.5 seconds   POC Glucose Once    Collection Time: 08/06/21 10:59 PM    Specimen: Blood   Result Value Ref Range    Glucose 258 (H) 70 - 105 mg/dL   Troponin    Collection Time: 08/07/21  3:55 AM    Specimen: Blood   Result Value Ref Range    Troponin T 2.810 (C) 0.000 - 0.030 ng/mL   CBC Auto Differential    Collection Time: 08/07/21  3:55 AM    Specimen: Blood   Result Value Ref Range    WBC 6.50 3.40 - 10.80 10*3/mm3    RBC 4.24 4.14 - 5.80 10*6/mm3    Hemoglobin 12.8 (L) 13.0 - 17.7 g/dL    Hematocrit 38.8 37.5 - 51.0 %    MCV 91.6 79.0 - 97.0 fL    MCH 30.3 26.6 - 33.0 pg    MCHC 33.0 31.5 - 35.7 g/dL    RDW 14.4 12.3 - 15.4 %    RDW-SD 46.4 37.0 - 54.0 fl    MPV 9.7 6.0 - 12.0 fL    Platelets 199 140 - 450 10*3/mm3    Neutrophil % 84.1 (H) 42.7 - 76.0 %    Lymphocyte % 9.7 (L) 19.6 - 45.3 %    Monocyte % 6.1 5.0 - 12.0 %    Eosinophil % 0.0 (L) 0.3 - 6.2 %    Basophil % 0.1 0.0 - 1.5 %    Neutrophils, Absolute 5.50 1.70 - 7.00 10*3/mm3    Lymphocytes,  Absolute 0.60 (L) 0.70 - 3.10 10*3/mm3    Monocytes, Absolute 0.40 0.10 - 0.90 10*3/mm3    Eosinophils, Absolute 0.00 0.00 - 0.40 10*3/mm3    Basophils, Absolute 0.00 0.00 - 0.20 10*3/mm3    nRBC 0.1 0.0 - 0.2 /100 WBC   aPTT    Collection Time: 21  3:55 AM    Specimen: Blood   Result Value Ref Range    PTT 27.4 (L) 61.0 - 76.5 seconds   Magnesium    Collection Time: 21  3:55 AM    Specimen: Blood   Result Value Ref Range    Magnesium 2.7 (H) 1.6 - 2.6 mg/dL   Phosphorus    Collection Time: 21  3:55 AM    Specimen: Blood   Result Value Ref Range    Phosphorus 4.8 (H) 2.5 - 4.5 mg/dL   Comprehensive Metabolic Panel    Collection Time: 21  3:55 AM    Specimen: Blood   Result Value Ref Range    Glucose 218 (H) 65 - 99 mg/dL    BUN 47 (H) 6 - 20 mg/dL    Creatinine 1.67 (H) 0.76 - 1.27 mg/dL    Sodium 135 (L) 136 - 145 mmol/L    Potassium 5.3 (H) 3.5 - 5.2 mmol/L    Chloride 101 98 - 107 mmol/L    CO2 21.0 (L) 22.0 - 29.0 mmol/L    Calcium 8.6 8.6 - 10.5 mg/dL    Total Protein 6.6 6.0 - 8.5 g/dL    Albumin 3.20 (L) 3.50 - 5.20 g/dL    ALT (SGPT) 175 (H) 1 - 41 U/L    AST (SGOT) 90 (H) 1 - 40 U/L    Alkaline Phosphatase 99 39 - 117 U/L    Total Bilirubin 0.3 0.0 - 1.2 mg/dL    eGFR Non African Amer 46 (L) >60 mL/min/1.73    Globulin 3.4 gm/dL    A/G Ratio 0.9 g/dL    BUN/Creatinine Ratio 28.1 (H) 7.0 - 25.0    Anion Gap 13.0 5.0 - 15.0 mmol/L   Troponin    Collection Time: 21 10:03 AM    Specimen: Blood   Result Value Ref Range    Troponin T 2.660 (C) 0.000 - 0.030 ng/mL   POC Glucose Once    Collection Time: 21 10:10 AM    Specimen: Blood   Result Value Ref Range    Glucose 248 (H) 70 - 105 mg/dL        Imaging:  Cardiac Catheterization/Vascular Study  Heart Cath Report    NAME:              Yogi Tucker  :                1980  AGE/SEX:        40 y.o. male  MRN:                [id number]  ADM DATE:      [unfilled]  DOS:                     Pre-Procedure Notes  H&P Performed   [x]  Yes []  No       []  N/A    Indications:   []  ACS <= 24 HRS   [x]  ACS >24 HRS   [x]  New Onset Angina <= 2 mos   []  Worsening Angina   []  Resuscitated Cardiac Arrest   []  Angina on Exertion:   []  Suspected CAD   []  Valvular Disease   []  Pericardial Disease   []  Cardiac Arrythmia   [x]  Cardiomyopathy   [x]  LV Dysfunction   []  Syncope   []  Post Cardiac Transplant   []  Eval. For Exercise Clearance   []  Abnormal Stress Test    []  Other   []  Pre-Operative Evaluation        If Pre-Op Eval:   Evaluation for Surgery Type:  []  Cardiac Surgery   []  Non-Cardiac   Surgery   Functional Capacity:  []  <4 METS  []  >=4 METS w/o symptoms          []  >= 4 METS with symptoms  []  Unknown   Surgical Risk:  []  Low  []  Intermediate         []  High Risk: Vascular  []  High Risk Non-Vascular    Risks, Benefits, & Complications Discussed:  [x]  Yes  []  No  []  N/A    Questions Answered:  [x]  Yes  []  No  []  N/A    Consent Obtained:  [x]  Yes  []  No  []  N/A    CHF: [x]  Yes  []  No     If Yes:  Newly Diagnosed?  []  Yes  [x]  No   If Yes:  HF Type:  []  Diastolic  [x]  Systolic  []  Unknown     Procedure Description  The patient underwent successful [x]  Left  []  Right  []  Left & Right    Heart catheterization and coronary angiography via the   [x]  Femoral approach  []  Radial approach  []  Brachial approach    Procedure Narrative:   Informed consent was obtained from the patient after explaining risks and   benefits.  Patient was brought to the cardiac catheterization laboratory   and placed on the table in the usual fashion.  Right groin was shaved and   prepped in sterile fashion. Moderate conscious sedation was given   utilizing IV Versed and fentanyl administered by RN with continuous EKG   oximetry and hemodynamic monitoring supervised by me throughout the entire   case, conscious sedation time was 30 minutes.  I was present with the   patient for the duration of moderate sedation and supervised  staff who had   no other duties and monitored the patient for the entire procedure patient   had Proctor 2-3 sedation scale. 2% lidocaine was used for local anesthesia   to the right groin.  A total of 20 cc was used.  A 6 Cameroonian sheath was   placed in the right femoral artery.  A  6 Cameroonian JR4 catheter and a 6   Cameroonian JL4 catheter was used for the procedure.  After the cardiac   catheterization is complete, all the catheters and sheath were removed.    Patient tolerated the procedure very well.  No complications noted.    Hemodynamic    LVEDP:   Estimated EF %:      Initial Aortic Pressure: 90/70    AV Gradient:      Rt. Heart Pressure:    Wall Motion:      Dominance:  []  Left  [x]  Right  []  Co-Dominant      Coronary Arteriography: (Please Code highest degree of stenosis)    Left Main %:   70%  Proximal LAD %: 100%  Mid/Distal LAD %: 100%  LCX %: 90% proximal.  OM 2 is 100% occluded  Ramus:    RCA %: 100%  Lima %: LIMA to the LAD is patent and the distal LAD has 80 to 90% disease  SVG(s) %: SVG to the RCA is occluded  SVG to the marginal branch is occluded    Complications: No complications    Estimated Blood Loss:  None    Impression and Recommendation: Severe three-vessel coronary disease  Status post coronary bypass surgery with 1 out of 3 grafts patent but the   LAD itself also has severe disease with collaterals to the circumflex   artery  Severe LV dysfunction  Aggressive treatment for heart failure and possible transfer to a facility   with LVAD and transplant program  Discussed with patient and family.    Electronically signed by Carl Appiah MD, 08/06/21, 6:52 PM EDT       ASSESSMENT:    NSTEMI (non-ST elevated myocardial infarction) (CMS/HCC)    Cardiomyopathy, ischemic    Coronary artery disease    Heart failure (CMS/HCC)  DEEPA    Hyperlipidemia    Hypertensive disorder    Presence of automatic implantable cardioverter-defibrillator    Cardiogenic shock (CMS/HCC)    Severe sepsis, not felt to be  septic shock    Type 2 diabetes mellitus with hyperglycemia (CMS/[facility])    SATHISH (acute kidney injury) (CMS/[facility])    H/O noncompliance with medical treatment, presenting hazards to health     PLAN:  Awaiting transfer to [place] for transplant work-up  O2 support wean down keep sat more than 88  Consider BIPAP while sleeping  Bronchodilator  Inhaled corticosteroids  Electrolytes  Glycemic control with sliding scale insulin and Lantus  DVT prophylaxis Heparin  GI prophylaxis not indicated, tolerating p.o. intake    Nephrology following  Cardiology following  Hospitalist following for primary medical management    Total Critical care time in direct medical management (   ) minutes  Alissa Vargas MD. D, ABSM.     8/7/2021  11:01 EDT     I personally have examined  and interviewed the patient. I have reviewed the history, data, problems, assessment and plan with our NP.  Critical care time in direct medical management (  34 ) minutes  Electronically signed by Alissa Vargas MD, D,ABSM, 08/08/21, 12:00 AM EDT.

## 2021-08-07 NOTE — PROGRESS NOTES
[facility] Medicine Services Daily Progress Note    Patient Name: Yogi Tucker  : 1980  MRN: [id number]  Primary Care Physician:  Mey Cox  Date of admission: 8/3/2021      Subjective      Chief Complaint: Denies for any chest pain or short of breath, no nausea or vomiting.      Patient Reports     ROS     Objective      Vitals:   Temp:  [95.8 °F (35.4 °C)-98.3 °F (36.8 °C)] 98.3 °F (36.8 °C)  Heart Rate:  [] 102  Resp:  [19-30] 19  BP: (101-123)/(72-95) 104/78  Flow (L/min):  [2] 2    Physical Exam  Vitals and nursing note reviewed.   Constitutional:       General: He is not in acute distress.     Appearance: Normal appearance. He is well-developed. He is not ill-appearing, toxic-appearing or diaphoretic.   HENT:      Head: Normocephalic and atraumatic.      Right Ear: Ear canal and external ear normal.      Left Ear: Ear canal and external ear normal.      Nose: Nose normal. No congestion or rhinorrhea.      Mouth/Throat:      Mouth: Mucous membranes are moist.      Pharynx: No oropharyngeal exudate.   Eyes:      General: No scleral icterus.        Right eye: No discharge.         Left eye: No discharge.      Extraocular Movements: Extraocular movements intact.      Conjunctiva/sclera: Conjunctivae normal.      Pupils: Pupils are equal, round, and reactive to light.   Neck:      Thyroid: No thyromegaly.      Vascular: No carotid bruit or JVD.      Trachea: No tracheal deviation.   Cardiovascular:      Rate and Rhythm: Normal rate and regular rhythm.      Pulses: Normal pulses.      Heart sounds: Normal heart sounds. No murmur heard.   No friction rub. No gallop.    Pulmonary:      Effort: Pulmonary effort is normal. No respiratory distress.      Breath sounds: Normal breath sounds. No stridor. No wheezing, rhonchi or rales.   Chest:      Chest wall: No tenderness.   Abdominal:      General: Bowel sounds are normal. There is no distension.      Palpations: Abdomen is  soft. There is no mass.      Tenderness: There is no abdominal tenderness. There is no guarding or rebound.      Hernia: No hernia is present.   Musculoskeletal:         General: No swelling, tenderness, deformity or signs of injury. Normal range of motion.      Cervical back: Normal range of motion and neck supple. No rigidity. No muscular tenderness.      Right lower leg: No edema.      Left lower leg: No edema.   Lymphadenopathy:      Cervical: No cervical adenopathy.   Skin:     General: Skin is warm and dry.      Coloration: Skin is not jaundiced or pale.      Findings: No bruising, erythema or rash.   Neurological:      General: No focal deficit present.      Mental Status: He is alert and oriented to person, place, and time. Mental status is at baseline.      Cranial Nerves: No cranial nerve deficit.      Sensory: No sensory deficit.      Motor: No weakness or abnormal muscle tone.      Coordination: Coordination normal.   Psychiatric:         Mood and Affect: Mood normal.         Behavior: Behavior normal.         Thought Content: Thought content normal.         Judgment: Judgment normal.             Result Review    Result Review:  I have personally reviewed the results from the time of this admission to 8/7/2021 14:24 EDT and agree with these findings:  [x]  Laboratory  [x]  Microbiology  [x]  Radiology  []  EKG/Telemetry   []  Cardiology/Vascular   []  Pathology  []  Old records  []  Other:  Most notable findings include:           Assessment/Plan      Brief Patient Summary:  Yogi Tucker is a 40 y.o. male who     aspirin, 81 mg, Oral, Daily  atorvastatin, 40 mg, Oral, Nightly  heparin (porcine), 5,000 Units, Subcutaneous, Q8H  insulin glargine, 10 Units, Subcutaneous, Nightly  insulin lispro, 0-14 Units, Subcutaneous, 4x Daily With Meals & Nightly   And  insulin lispro, 0-14 Units, Subcutaneous, 4x Daily With Meals & Nightly       sodium chloride, 100 mL/hr, Last Rate: 100 mL/hr (08/06/21 0443)          Active Hospital Problems:  Active Hospital Problems    Diagnosis    • **NSTEMI (non-ST elevated myocardial infarction) (CMS/[facility])    • Cardiogenic shock (CMS/[facility])    • Severe sepsis, not felt to be septic shock    • SATHISH (acute kidney injury) (CMS/[facility])    • H/O noncompliance with medical treatment, presenting hazards to health    • Type 2 diabetes mellitus with hyperglycemia (CMS/[facility])    • Coronary artery disease    • Hypertensive disorder    • Hyperlipidemia    • Cardiomyopathy, ischemic    • Presence of automatic implantable cardioverter-defibrillator    • Heart failure (CMS/[facility])      A/P     NSTEMI (non-ST elevated myocardial infarction) Cardiomyopathy, ischemic  -Cardiology consulted, plan for cardiac cath noted.  - Status post cardiac cath revealing Status post coronary bypass surgery with 1 out of 3 grafts patent but the LAD itself also has severe disease with collaterals to the circumflex artery  Severe LV dysfunction  Aggressive treatment for heart failure and possible transfer to a facility with LVAD and transplant program ... waiting on bed for the [facility] to [facility] to [facility].  -Patient currently on heparin drip  -Currently troponins trending downward  -EKG reviewed     - Likely reactive leucocytosis.  Cardiogenic shock  -Patient was given fluid bolus in ED.    -Continuous IV fluids initiated  -Dopamine drip was initiated to maintain MAP >65mmhg has since weaned off  -Lactic acid was 1.9  -Blood cultures are pending  -Negative UA  -Procalcitonin 0.36, CRP 14-42  -Antibiotics were subsequently discontinued  -Continue to monitor off antibiotics.     Ischemic cardiomyopathy/systolic heart failure with reduced ejection/presence of automatic implantable cardioverter-defibrillator   -Ischemic cardiomyopathy/systolic heart failure likely due to uncontrolled hypertension.  -Heart failure not  to be in exacerbation  -proBNP on August 3, 2021 was 13,350  -Last ejection fraction was 35% in January 2018 echocardiogram from  August 3, 2021 shows left ejection fraction less than 20% with a right ventricular cavity mildly dilated, severe mitral valve regurgitation, moderate tricuspid valve regurgitation, with no pericardial effusions noted.  -As stated above cardiology consulted     Elevated LFTs  Likely secondary to cardiogenic shock  -ALT 44,   -  -Continue to monitor liver function     Hyperlipidemia  -Patient on atorvastatin at home  -Hold until liver enzymes normalize  -Last lipid panel shows total cholesterol 130, HDL cholesterol 51, LDL cholesterol 56, VLDL cholesterol 23, triglycerides 133, LDL-HDL ratio 1.03  -Encourage lifestyle modifications to include reduced cholesterol heart healthy diet.     Hypertensive disorder  -Patient currentl borderline hypotensive requiring support to maintain adequate perfusion.  -May continue Coreg per cardiology recommendations when clinically appropriate.      Type 2 diabetes mellitus with hyperglycemia   -Patient has been noncompliant with medical management  -Hemoglobin A1c 7.5  -Holding home Metformin and glimepiride during hospitalization due to risk of acidosis.  As well as better glycemic control with sliding scale insulin during hospitalization  -Continue sliding scale        SATHISH (acute kidney injury) (CMS/HCC)  -Likely due to cardiogenic shock/hypotension  -IV fluids initiated on admission  -Avoid nephrotoxic agents  -Avoid hypotension  -Creatinine 1.76 today, BUN 40 today  -Nephrology following      H/O noncompliance with medical treatment, presenting hazards to health  -Reinforce importance of medical compliance.  Especially in the setting of current cardiovascular health and his extensive cardiac history.     This patient has been examined wearing appropriate Personal Protective Equipment 08/05/21      DVT prophylaxis:  Medical DVT prophylaxis orders are present.    CODE STATUS:    Code Status: CPR  Medical Interventions (Level of Support Prior to Arrest): Full      Disposition:   I expect patient to be discharged once cleared by cardiology service..    This patient has been examined wearing appropriate Personal Protective Equipment and discussed with hospital infection control department. 08/07/21      Electronically signed by Seven Mcneil MD, 08/07/21, 14:24 EDT.  [facility]

## 2021-08-07 NOTE — CONSULTS
CARDIOTHORACIC CONSULT NOTE      Referring Provider: Dr. Appiah    Reason for Consultation: CAD    Attending: Sveen Mcneil MD    Subjective .     History of present illness:  Yogi Tucker is a 40 y.o. male who presented with shortness of breath and chest discomfort on 8/3/2021.   He has a history of CABG, ICM s/p ICD placement, HTN, and renal insufficiency.  He underwent cardiac cath yesterday that demonstrated 1 of 3 patent grafts, severe LAD disease with collateral circulation to Lcx.  We have been consulted for surgical opinion.      Review of Systems   All other systems reviewed and are negative.      History  Past Medical History:   Diagnosis Date   • Coronary artery disease    • Hyperlipidemia    • Hypertension        Past Surgical History:   Procedure Laterality Date   • CORONARY ARTERY BYPASS GRAFT     • LEFT HEART CATH         Family History   Problem Relation Age of Onset   • No Known Problems Mother    • No Known Problems Father        Social History     Tobacco Use   • Smoking status: Never Smoker   • Smokeless tobacco: Former User     Types: Chew   Substance Use Topics   • Alcohol use: Not on file   • Drug use: Not on file        Medications Prior to Admission   Medication Sig Dispense Refill Last Dose   • acetaminophen (TYLENOL) 325 MG tablet Take 650 mg by mouth As Needed for Mild Pain .   8/3/2021 at 0300   • aspirin 325 MG tablet Take 325 mg by mouth Every Morning.   8/3/2021 at Unknown time   • carvedilol (COREG) 25 MG tablet TAKE 1 TABLET TWICE A  tablet 2 8/3/2021 at Unknown time   • dapagliflozin (Farxiga) 5 MG tablet tablet Take 5 mg by mouth Daily.   8/2/2021 at Unknown time   • Entresto 49-51 MG tablet TAKE 1 TABLET TWICE A  tablet 2 8/3/2021 at Unknown time   • glimepiride (AMARYL) 4 MG tablet Take 4 mg by mouth 2 (two) times a day.   8/3/2021 at Unknown time   • metFORMIN (GLUCOPHAGE) 1000 MG tablet Take 1,000 mg by mouth 2 (Two) Times a Day.   8/3/2021 at Unknown  "time   • naproxen sodium (ALEVE) 220 MG tablet Take 220 mg by mouth As Needed.   8/3/2021 at 1030   • simvastatin (ZOCOR) 40 MG tablet TAKE 1 TABLET ONCE DAILY 90 tablet 2 8/2/2021 at Unknown time         Shellfish allergy    Scheduled Meds:aspirin, 81 mg, Oral, Daily  atorvastatin, 40 mg, Oral, Nightly  insulin lispro, 0-14 Units, Subcutaneous, Q6H      Continuous Infusions:heparin, 9.26 Units/kg/hr, Last Rate: 20.26 Units/kg/hr (08/06/21 1555)  sodium chloride, 100 mL/hr, Last Rate: 100 mL/hr (08/06/21 2145)      PRN Meds:.•  acetaminophen **OR** acetaminophen  •  aluminum-magnesium hydroxide-simethicone  •  atropine  •  dextrose  •  dextrose  •  glucagon (human recombinant)  •  heparin  •  heparin  •  insulin lispro **AND** insulin lispro  •  nitroglycerin  •  ondansetron **OR** ondansetron  •  sodium chloride    Objective     VITAL SIGNS  Vitals:    08/07/21 0500 08/07/21 0553 08/07/21 0600 08/07/21 0800   BP: 111/74  104/78    Pulse: 74  102    Resp:       Temp:    98.2 °F (36.8 °C)   TempSrc:       SpO2: 100%  98%    Weight:  113 kg (248 lb 10.9 oz)     Height:           Flowsheet Rows      First Filed Value   Admission Height  182.9 cm (72\") Documented at 08/03/2021 1345   Admission Weight  108 kg (237 lb 7 oz) Documented at 08/03/2021 1345           TELEMETRY:     Physical Exam:  Physical Exam     Results Review:        WBC WBC   Date Value Ref Range Status   08/07/2021 6.50 3.40 - 10.80 10*3/mm3 Final   08/06/2021 10.60 3.40 - 10.80 10*3/mm3 Final   08/05/2021 11.90 (H) 3.40 - 10.80 10*3/mm3 Final   08/05/2021 12.30 (H) 3.40 - 10.80 10*3/mm3 Final      HGB Hemoglobin   Date Value Ref Range Status   08/07/2021 12.8 (L) 13.0 - 17.7 g/dL Final   08/06/2021 13.1 13.0 - 17.7 g/dL Final   08/05/2021 12.9 (L) 13.0 - 17.7 g/dL Final   08/05/2021 13.4 13.0 - 17.7 g/dL Final      HCT Hematocrit   Date Value Ref Range Status   08/07/2021 38.8 37.5 - 51.0 % Final   08/06/2021 39.8 37.5 - 51.0 % Final   08/05/2021 39.7 " 37.5 - 51.0 % Final   08/05/2021 40.1 37.5 - 51.0 % Final      Platelets Platelets   Date Value Ref Range Status   08/07/2021 199 140 - 450 10*3/mm3 Final   08/06/2021 173 140 - 450 10*3/mm3 Final   08/05/2021 194 140 - 450 10*3/mm3 Final   08/05/2021 171 140 - 450 10*3/mm3 Final        PT/INR:  No results found for: PROTIME/No results found for: INR    Sodium Sodium   Date Value Ref Range Status   08/07/2021 135 (L) 136 - 145 mmol/L Final   08/06/2021 131 (L) 136 - 145 mmol/L Final   08/05/2021 134 (L) 136 - 145 mmol/L Final   08/05/2021 136 136 - 145 mmol/L Final   08/04/2021 137 136 - 145 mmol/L Final      Potassium Potassium   Date Value Ref Range Status   08/07/2021 5.3 (H) 3.5 - 5.2 mmol/L Final   08/06/2021 4.9 3.5 - 5.2 mmol/L Final   08/05/2021 4.9 3.5 - 5.2 mmol/L Final   08/05/2021 5.0 3.5 - 5.2 mmol/L Final   08/04/2021 5.0 3.5 - 5.2 mmol/L Final      Chloride Chloride   Date Value Ref Range Status   08/07/2021 101 98 - 107 mmol/L Final   08/06/2021 99 98 - 107 mmol/L Final   08/05/2021 101 98 - 107 mmol/L Final   08/05/2021 101 98 - 107 mmol/L Final   08/04/2021 101 98 - 107 mmol/L Final      Bicarbonate CO2   Date Value Ref Range Status   08/07/2021 21.0 (L) 22.0 - 29.0 mmol/L Final   08/06/2021 21.0 (L) 22.0 - 29.0 mmol/L Final   08/05/2021 23.0 22.0 - 29.0 mmol/L Final   08/05/2021 24.0 22.0 - 29.0 mmol/L Final   08/04/2021 23.0 22.0 - 29.0 mmol/L Final      BUN BUN   Date Value Ref Range Status   08/07/2021 47 (H) 6 - 20 mg/dL Final   08/06/2021 39 (H) 6 - 20 mg/dL Final   08/05/2021 40 (H) 6 - 20 mg/dL Final   08/05/2021 37 (H) 6 - 20 mg/dL Final   08/04/2021 31 (H) 6 - 20 mg/dL Final      Creatinine Creatinine   Date Value Ref Range Status   08/07/2021 1.67 (H) 0.76 - 1.27 mg/dL Final   08/06/2021 1.79 (H) 0.76 - 1.27 mg/dL Final   08/05/2021 1.76 (H) 0.76 - 1.27 mg/dL Final   08/05/2021 1.80 (H) 0.76 - 1.27 mg/dL Final   08/04/2021 1.79 (H) 0.76 - 1.27 mg/dL Final      Calcium Calcium   Date  Value Ref Range Status   08/07/2021 8.6 8.6 - 10.5 mg/dL Final   08/06/2021 8.4 (L) 8.6 - 10.5 mg/dL Final   08/05/2021 8.6 8.6 - 10.5 mg/dL Final   08/05/2021 8.3 (L) 8.6 - 10.5 mg/dL Final   08/04/2021 8.6 8.6 - 10.5 mg/dL Final      Magnesium Magnesium   Date Value Ref Range Status   08/07/2021 2.7 (H) 1.6 - 2.6 mg/dL Final   08/06/2021 2.3 1.6 - 2.6 mg/dL Final   08/05/2021 2.1 1.6 - 2.6 mg/dL Final        Troponin No results found for: TROPONIN   BNP No results found for: BNP           Assessment/Plan       NSTEMI (non-ST elevated myocardial infarction) (CMS/[facility])    Cardiomyopathy, ischemic    Coronary artery disease    Heart failure (CMS/[facility])    Hyperlipidemia    Hypertensive disorder    Presence of automatic implantable cardioverter-defibrillator    Cardiogenic shock (CMS/[facility])    Severe sepsis, not felt to be septic shock    Type 2 diabetes mellitus with hyperglycemia (CMS/[facility])    SATHISH (acute kidney injury) (CMS/[facility])    H/O noncompliance with medical treatment, presenting hazards to health        Dr. Kilgore discussed with Dr. Appiah, plan for CHF management, transfer to [facility] for transplant consideration, no surgical intervention at this time.      Thank you for allowing us to participate in the care of this patient.    WHIT Angeles  08/07/21  11:07 EDT  Lurdes Mckeon, Cardiothoracic Surgery, [phone] (cell)

## 2021-08-07 NOTE — PLAN OF CARE
Goal Outcome Evaluation:         Patient has remained stable throughout shift. Wife is at bedside and both have been educated over the situation and questions have been answered and both are very appropriate with their emotions. No new needs and vitals have remained stable. Patient is very pleasant and very aware of his condition and situation.

## 2021-08-07 NOTE — PROGRESS NOTES
"   LOS: 3 days    Patient Care Team:  Mey Cox as PCP - General    Chief Complaint:    Chief Complaint   Patient presents with   • Shortness of Breath     Soa, coughing, fever,  chest pain/tightness, pt is  pending covid test from PMD.  pt is vaccinated     Follow UP SATHISH  Subjective     Interval History:   No soa no cp.    Review of Systems:   As noted above    Objective     Vital Signs  Temp:  [97.5 °F (36.4 °C)-97.8 °F (36.6 °C)] 97.8 °F (36.6 °C)  Heart Rate:  [] 95  Resp:  [17-30] 30  BP: ()/(66-95) 121/72    Flowsheet Rows      First Filed Value   Admission Height  182.9 cm (72\") Documented at 08/03/2021 1345   Admission Weight  108 kg (237 lb 7 oz) Documented at 08/03/2021 1345          No intake/output data recorded.  I/O last 3 completed shifts:  In: 1821 [P.O.:880; I.V.:941]  Out: 1375 [Urine:1375]    Intake/Output Summary (Last 24 hours) at 8/6/2021 2105  Last data filed at 8/6/2021 1330  Gross per 24 hour   Intake 240 ml   Output 600 ml   Net -360 ml       Physical Exam:  General Appearance: alert, oriented x 3, no acute distress,   Skin: warm and dry  HEENT: pupils round and reactive to light, oral mucosa normal, nonicteric sclera  Neck: supple, no JVD, trachea midline  Lungs: CTA, unlabored breathing effort  Heart: RRR, normal S1 and S2, no S3, no rub  Abdomen: soft, nontender, normoactive bowels  : no palpable bladder,  Extremities: no edema, cyanosis or clubbing  Neuro: normal speech and mental status       Results Review:    Results from last 7 days   Lab Units 08/06/21  0012 08/05/21  1725 08/05/21  0450 08/04/21  1225 08/04/21  0502   SODIUM mmol/L 131* 134* 136   < > 137   POTASSIUM mmol/L 4.9 4.9 5.0   < > 4.4   CHLORIDE mmol/L 99 101 101   < > 101   CO2 mmol/L 21.0* 23.0 24.0   < > 25.0   BUN mg/dL 39* 40* 37*   < > 29*   CREATININE mg/dL 1.79* 1.76* 1.80*   < > 1.72*   CALCIUM mg/dL 8.4* 8.6 8.3*   < > 8.2*   BILIRUBIN mg/dL 0.5  --  0.6  --  0.7   ALK PHOS U/L 105  --  100  " --  96   ALT (SGPT) U/L 113*  --  105*  --  46*   AST (SGOT) U/L 96*  --  151*  --  110*   GLUCOSE mg/dL 190* 136* 168*   < > 152*    < > = values in this interval not displayed.       Estimated Creatinine Clearance: 71.7 mL/min (A) (by C-G formula based on SCr of 1.79 mg/dL (H)).    Results from last 7 days   Lab Units 08/06/21  0012 08/05/21  0450 08/04/21  0502   MAGNESIUM mg/dL 2.3 2.1 2.0   PHOSPHORUS mg/dL 3.1 3.4 2.8       Results from last 7 days   Lab Units 08/05/21  0450   URIC ACID mg/dL 8.8*       Results from last 7 days   Lab Units 08/06/21  0553 08/05/21  1725 08/05/21  0450 08/04/21  0502 08/03/21  1849   WBC 10*3/mm3 10.60 11.90* 12.30* 11.60* 14.50*   HEMOGLOBIN g/dL 13.1 12.9* 13.4 13.2 13.8   PLATELETS 10*3/mm3 173 194 171 154 181       Results from last 7 days   Lab Units 08/03/21  1849   INR  1.01         Imaging Results (Last 24 Hours)     ** No results found for the last 24 hours. **        aspirin, 81 mg, Oral, Daily  atorvastatin, 40 mg, Oral, Nightly  insulin lispro, 0-14 Units, Subcutaneous, Q6H      heparin, 9.26 Units/kg/hr, Last Rate: 20.26 Units/kg/hr (08/06/21 1555)  sodium chloride, 100 mL/hr, Last Rate: 100 mL/hr (08/06/21 0816)        Medication Review:   Current Facility-Administered Medications   Medication Dose Route Frequency Provider Last Rate Last Admin   • acetaminophen (TYLENOL) tablet 650 mg  650 mg Oral Q4H PRN Carl Appiah MD        Or   • acetaminophen (TYLENOL) suppository 650 mg  650 mg Rectal Q4H PRN Carl Appiah MD       • aluminum-magnesium hydroxide-simethicone (MAALOX MAX) 400-400-40 MG/5ML suspension 15 mL  15 mL Oral Q6H PRN Carl Appiah MD       • aspirin EC tablet 81 mg  81 mg Oral Daily Carl Appiah MD   81 mg at 08/06/21 0816   • atorvastatin (LIPITOR) tablet 40 mg  40 mg Oral Nightly Carl Appiah MD   40 mg at 08/05/21 2114   • atropine sulfate injection 0.5 mg  0.5 mg Intravenous Q5 Min PRN Carl Appiah MD       • dextrose (D50W) 25 g/ 50mL  Intravenous Solution 25 g  25 g Intravenous Q15 Min PRN Carl Appiah MD       • dextrose (GLUTOSE) oral gel 15 g  15 g Oral Q15 Min PRN Carl Appiah MD       • glucagon (human recombinant) (GLUCAGEN DIAGNOSTIC) injection 1 mg  1 mg Subcutaneous Q15 Min PRN Carl Appiah MD       • heparin 94973 units/250 mL (100 units/mL) in 0.45 % NaCl infusion  9.26 Units/kg/hr Intravenous Titrated Carl Appiah MD 21.8 mL/hr at 08/06/21 1555 20.26 Units/kg/hr at 08/06/21 1555   • heparin bolus from bag 2,500 Units  2,500 Units Intravenous Q6H PRN Carl Appiah MD   2,500 Units at 08/06/21 1558   • heparin bolus from bag 5,000 Units  5,000 Units Intravenous Q6H PRN Carl Appiah MD   5,000 Units at 08/04/21 0642   • insulin lispro (ADMELOG) injection 0-14 Units  0-14 Units Subcutaneous Q6H Carl Appiah MD   3 Units at 08/06/21 1149    And   • insulin lispro (ADMELOG) injection 0-14 Units  0-14 Units Subcutaneous PRN Carl Appiah MD       • nitroglycerin (NITROSTAT) SL tablet 0.4 mg  0.4 mg Sublingual Q5 Min PRN Carl Appiah MD       • ondansetron (ZOFRAN) tablet 4 mg  4 mg Oral Q6H PRN Carl Appiah MD        Or   • ondansetron (ZOFRAN) injection 4 mg  4 mg Intravenous Q6H PRN Carl Appiah MD   4 mg at 08/06/21 0842   • sodium chloride 0.9 % infusion 250 mL  250 mL Intravenous Once PRN Carl Appiah MD       • sodium chloride 0.9 % infusion  100 mL/hr Intravenous Continuous Carl Appiah  mL/hr at 08/06/21 0816 100 mL/hr at 08/06/21 0816       Assessment/Plan         NSTEMI (non-ST elevated myocardial infarction) (CMS/HCC)    Cardiomyopathy, ischemic    Coronary artery disease    Heart failure (CMS/HCC)    Hyperlipidemia    Hypertensive disorder    Presence of automatic implantable cardioverter-defibrillator    Cardiogenic shock (CMS/HCC)    Severe sepsis, not felt to be septic shock    Type 2 diabetes mellitus with hyperglycemia (CMS/HCC)    SATHISH (acute kidney injury) (CMS/HCC)    H/O noncompliance with  medical treatment, presenting hazards to health    1.  Renal insufficiency: Reduced perfusion in the setting of nsaid use?  Some central volume overload.  Avoiding precath IVF.  Continue mucomysyt.  Creatinine 1.8, stable.  Ua with small proteinuria no blood.  Renal ultrasound unremarkable.  2.  Chest pain in the setting ischemic cardiomyopathy  3.  History of CABG  4.  Hyperlipidemia     Plan:  Explained to patient the risks and benefits of cardiac catheterization in terms of kidney function.    Ok to proceed with cardiac catheterization.  Ordered IVF.      Karen Espinosa MD  08/06/21  21:05 EDT

## 2021-08-08 LAB
ALBUMIN SERPL-MCNC: 3.3 G/DL (ref 3.5–5.2)
ALBUMIN/GLOB SERPL: 1.1 G/DL
ALP SERPL-CCNC: 93 U/L (ref 39–117)
ALT SERPL W P-5'-P-CCNC: 168 U/L (ref 1–41)
ANION GAP SERPL CALCULATED.3IONS-SCNC: 8 MMOL/L (ref 5–15)
AST SERPL-CCNC: 53 U/L (ref 1–40)
BACTERIA SPEC AEROBE CULT: NORMAL
BACTERIA SPEC AEROBE CULT: NORMAL
BASOPHILS # BLD AUTO: 0 10*3/MM3 (ref 0–0.2)
BASOPHILS NFR BLD AUTO: 0.2 % (ref 0–1.5)
BILIRUB SERPL-MCNC: 0.3 MG/DL (ref 0–1.2)
BUN SERPL-MCNC: 59 MG/DL (ref 6–20)
BUN/CREAT SERPL: 31.7 (ref 7–25)
CALCIUM SPEC-SCNC: 8.7 MG/DL (ref 8.6–10.5)
CHLORIDE SERPL-SCNC: 99 MMOL/L (ref 98–107)
CO2 SERPL-SCNC: 23 MMOL/L (ref 22–29)
CREAT SERPL-MCNC: 1.86 MG/DL (ref 0.76–1.27)
DEPRECATED RDW RBC AUTO: 43.8 FL (ref 37–54)
EOSINOPHIL # BLD AUTO: 0 10*3/MM3 (ref 0–0.4)
EOSINOPHIL NFR BLD AUTO: 0 % (ref 0.3–6.2)
ERYTHROCYTE [DISTWIDTH] IN BLOOD BY AUTOMATED COUNT: 13.8 % (ref 12.3–15.4)
GFR SERPL CREATININE-BSD FRML MDRD: 40 ML/MIN/1.73
GLOBULIN UR ELPH-MCNC: 3.1 GM/DL
GLUCOSE BLDC GLUCOMTR-MCNC: 209 MG/DL (ref 70–105)
GLUCOSE BLDC GLUCOMTR-MCNC: 251 MG/DL (ref 70–105)
GLUCOSE BLDC GLUCOMTR-MCNC: 298 MG/DL (ref 70–105)
GLUCOSE SERPL-MCNC: 276 MG/DL (ref 65–99)
HCT VFR BLD AUTO: 36.9 % (ref 37.5–51)
HGB BLD-MCNC: 12.2 G/DL (ref 13–17.7)
LYMPHOCYTES # BLD AUTO: 1.1 10*3/MM3 (ref 0.7–3.1)
LYMPHOCYTES NFR BLD AUTO: 8.8 % (ref 19.6–45.3)
MAGNESIUM SERPL-MCNC: 2.7 MG/DL (ref 1.6–2.6)
MCH RBC QN AUTO: 30 PG (ref 26.6–33)
MCHC RBC AUTO-ENTMCNC: 33 G/DL (ref 31.5–35.7)
MCV RBC AUTO: 91.1 FL (ref 79–97)
MONOCYTES # BLD AUTO: 1.1 10*3/MM3 (ref 0.1–0.9)
MONOCYTES NFR BLD AUTO: 8.9 % (ref 5–12)
NEUTROPHILS NFR BLD AUTO: 10.3 10*3/MM3 (ref 1.7–7)
NEUTROPHILS NFR BLD AUTO: 82.1 % (ref 42.7–76)
NRBC BLD AUTO-RTO: 0.2 /100 WBC (ref 0–0.2)
PHOSPHATE SERPL-MCNC: 3.8 MG/DL (ref 2.5–4.5)
PLATELET # BLD AUTO: 227 10*3/MM3 (ref 140–450)
PMV BLD AUTO: 9.7 FL (ref 6–12)
POTASSIUM SERPL-SCNC: 5.7 MMOL/L (ref 3.5–5.2)
PROT SERPL-MCNC: 6.4 G/DL (ref 6–8.5)
RBC # BLD AUTO: 4.05 10*6/MM3 (ref 4.14–5.8)
SODIUM SERPL-SCNC: 130 MMOL/L (ref 136–145)
TROPONIN T SERPL-MCNC: 3.32 NG/ML (ref 0–0.03)
TROPONIN T SERPL-MCNC: 3.4 NG/ML (ref 0–0.03)
TROPONIN T SERPL-MCNC: 3.61 NG/ML (ref 0–0.03)
WBC # BLD AUTO: 12.5 10*3/MM3 (ref 3.4–10.8)

## 2021-08-08 PROCEDURE — 84484 ASSAY OF TROPONIN QUANT: CPT | Performed by: NURSE PRACTITIONER

## 2021-08-08 PROCEDURE — 36415 COLL VENOUS BLD VENIPUNCTURE: CPT | Performed by: NURSE PRACTITIONER

## 2021-08-08 PROCEDURE — 25010000002 HEPARIN (PORCINE) PER 1000 UNITS: Performed by: NURSE PRACTITIONER

## 2021-08-08 PROCEDURE — 99233 SBSQ HOSP IP/OBS HIGH 50: CPT | Performed by: INTERNAL MEDICINE

## 2021-08-08 PROCEDURE — 63710000001 INSULIN GLARGINE PER 5 UNITS: Performed by: NURSE PRACTITIONER

## 2021-08-08 PROCEDURE — 85025 COMPLETE CBC W/AUTO DIFF WBC: CPT | Performed by: INTERNAL MEDICINE

## 2021-08-08 PROCEDURE — 84100 ASSAY OF PHOSPHORUS: CPT | Performed by: INTERNAL MEDICINE

## 2021-08-08 PROCEDURE — 82962 GLUCOSE BLOOD TEST: CPT

## 2021-08-08 PROCEDURE — 63710000001 INSULIN LISPRO (HUMAN) PER 5 UNITS: Performed by: NURSE PRACTITIONER

## 2021-08-08 PROCEDURE — 99232 SBSQ HOSP IP/OBS MODERATE 35: CPT | Performed by: HOSPITALIST

## 2021-08-08 PROCEDURE — 80053 COMPREHEN METABOLIC PANEL: CPT | Performed by: INTERNAL MEDICINE

## 2021-08-08 PROCEDURE — 83735 ASSAY OF MAGNESIUM: CPT | Performed by: INTERNAL MEDICINE

## 2021-08-08 PROCEDURE — 25010000002 DIPHENHYDRAMINE PER 50 MG: Performed by: INTERNAL MEDICINE

## 2021-08-08 RX ORDER — INSULIN GLARGINE 100 [IU]/ML
10 INJECTION, SOLUTION SUBCUTANEOUS EVERY 12 HOURS SCHEDULED
Status: DISCONTINUED | OUTPATIENT
Start: 2021-08-08 | End: 2021-08-09 | Stop reason: HOSPADM

## 2021-08-08 RX ORDER — BUMETANIDE 0.25 MG/ML
1.5 INJECTION INTRAMUSCULAR; INTRAVENOUS ONCE
Status: COMPLETED | OUTPATIENT
Start: 2021-08-08 | End: 2021-08-08

## 2021-08-08 RX ORDER — MIDODRINE HYDROCHLORIDE 5 MG/1
10 TABLET ORAL
Status: DISCONTINUED | OUTPATIENT
Start: 2021-08-08 | End: 2021-08-09

## 2021-08-08 RX ORDER — SODIUM POLYSTYRENE SULFONATE 15 G/60ML
15 SUSPENSION ORAL; RECTAL EVERY 6 HOURS
Status: COMPLETED | OUTPATIENT
Start: 2021-08-08 | End: 2021-08-08

## 2021-08-08 RX ORDER — DIPHENHYDRAMINE HYDROCHLORIDE 50 MG/ML
25 INJECTION INTRAMUSCULAR; INTRAVENOUS ONCE
Status: COMPLETED | OUTPATIENT
Start: 2021-08-08 | End: 2021-08-08

## 2021-08-08 RX ORDER — CARVEDILOL 6.25 MG/1
6.25 TABLET ORAL 2 TIMES DAILY
Status: DISCONTINUED | OUTPATIENT
Start: 2021-08-08 | End: 2021-08-09

## 2021-08-08 RX ADMIN — BUMETANIDE 1.5 MG: 0.25 INJECTION, SOLUTION INTRAMUSCULAR; INTRAVENOUS at 21:47

## 2021-08-08 RX ADMIN — SODIUM POLYSTYRENE SULFONATE 15 G: 15 SUSPENSION ORAL; RECTAL at 20:00

## 2021-08-08 RX ADMIN — HEPARIN SODIUM 5000 UNITS: 5000 INJECTION INTRAVENOUS; SUBCUTANEOUS at 05:37

## 2021-08-08 RX ADMIN — DIPHENHYDRAMINE HYDROCHLORIDE 25 MG: 50 INJECTION, SOLUTION INTRAMUSCULAR; INTRAVENOUS at 22:56

## 2021-08-08 RX ADMIN — ASPIRIN 81 MG: 81 TABLET, COATED ORAL at 09:57

## 2021-08-08 RX ADMIN — ATORVASTATIN CALCIUM 40 MG: 40 TABLET, FILM COATED ORAL at 20:53

## 2021-08-08 RX ADMIN — SODIUM POLYSTYRENE SULFONATE 15 G: 15 SUSPENSION ORAL; RECTAL at 13:45

## 2021-08-08 RX ADMIN — INSULIN GLARGINE 10 UNITS: 100 INJECTION, SOLUTION SUBCUTANEOUS at 20:53

## 2021-08-08 RX ADMIN — CARVEDILOL 6.25 MG: 6.25 TABLET, FILM COATED ORAL at 13:44

## 2021-08-08 RX ADMIN — INSULIN LISPRO 8 UNITS: 100 INJECTION, SOLUTION INTRAVENOUS; SUBCUTANEOUS at 09:57

## 2021-08-08 RX ADMIN — INSULIN LISPRO 8 UNITS: 100 INJECTION, SOLUTION INTRAVENOUS; SUBCUTANEOUS at 13:44

## 2021-08-08 RX ADMIN — CARVEDILOL 6.25 MG: 6.25 TABLET, FILM COATED ORAL at 21:04

## 2021-08-08 RX ADMIN — HEPARIN SODIUM 5000 UNITS: 5000 INJECTION INTRAVENOUS; SUBCUTANEOUS at 13:44

## 2021-08-08 RX ADMIN — HEPARIN SODIUM 5000 UNITS: 5000 INJECTION INTRAVENOUS; SUBCUTANEOUS at 21:47

## 2021-08-08 RX ADMIN — INSULIN LISPRO 5 UNITS: 100 INJECTION, SOLUTION INTRAVENOUS; SUBCUTANEOUS at 21:04

## 2021-08-08 RX ADMIN — MIDODRINE HYDROCHLORIDE 10 MG: 5 TABLET ORAL at 20:53

## 2021-08-08 RX ADMIN — INSULIN GLARGINE 10 UNITS: 100 INJECTION, SOLUTION SUBCUTANEOUS at 13:45

## 2021-08-08 NOTE — NURSING NOTE
Spoke with the Transfer Center at [facility]. They were unable to confirm a day or time they will have a bed for the patient. I spoke with , he advised a transfer to [facility]. He stated he would update  as well. Dr. Vargas and Tatiana Perez have been notified of the change as well.

## 2021-08-08 NOTE — PROGRESS NOTES
[facility] Medicine Services Daily Progress Note    Patient Name: Yogi Tucker  : 1980  MRN: [id number]  Primary Care Physician:  Mey Cox  Date of admission: 8/3/2021      Subjective      Chief Complaint: No chest  Pain, no nausea or vomiting, no abdominal pain.      Patient Reports     ROS     Objective      Vitals:   Temp:  [97.5 °F (36.4 °C)-98.2 °F (36.8 °C)] 97.6 °F (36.4 °C)  Heart Rate:  [] 102  Resp:  [22] 22  BP: ()/(62-93) 92/62  Flow (L/min):  [2] 2    Physical Exam  Vitals and nursing note reviewed.   Constitutional:       General: He is not in acute distress.     Appearance: Normal appearance. He is well-developed. He is not ill-appearing, toxic-appearing or diaphoretic.   HENT:      Head: Normocephalic and atraumatic.      Right Ear: Ear canal and external ear normal.      Left Ear: Ear canal and external ear normal.      Nose: Nose normal. No congestion or rhinorrhea.      Mouth/Throat:      Mouth: Mucous membranes are moist.      Pharynx: No oropharyngeal exudate.   Eyes:      General: No scleral icterus.        Right eye: No discharge.         Left eye: No discharge.      Extraocular Movements: Extraocular movements intact.      Conjunctiva/sclera: Conjunctivae normal.      Pupils: Pupils are equal, round, and reactive to light.   Neck:      Thyroid: No thyromegaly.      Vascular: No carotid bruit or JVD.      Trachea: No tracheal deviation.   Cardiovascular:      Rate and Rhythm: Normal rate and regular rhythm.      Pulses: Normal pulses.      Heart sounds: Normal heart sounds. No murmur heard.   No friction rub. No gallop.    Pulmonary:      Effort: Pulmonary effort is normal. No respiratory distress.      Breath sounds: Normal breath sounds. No stridor. No wheezing, rhonchi or rales.   Chest:      Chest wall: No tenderness.   Abdominal:      General: Bowel sounds are normal. There is no distension.      Palpations: Abdomen is soft. There is no  mass.      Tenderness: There is no abdominal tenderness. There is no guarding or rebound.      Hernia: No hernia is present.   Musculoskeletal:         General: No swelling, tenderness, deformity or signs of injury. Normal range of motion.      Cervical back: Normal range of motion and neck supple. No rigidity. No muscular tenderness.      Right lower leg: No edema.      Left lower leg: No edema.   Lymphadenopathy:      Cervical: No cervical adenopathy.   Skin:     General: Skin is warm and dry.      Coloration: Skin is not jaundiced or pale.      Findings: No bruising, erythema or rash.   Neurological:      General: No focal deficit present.      Mental Status: He is alert and oriented to person, place, and time. Mental status is at baseline.      Cranial Nerves: No cranial nerve deficit.      Sensory: No sensory deficit.      Motor: No weakness or abnormal muscle tone.      Coordination: Coordination normal.   Psychiatric:         Mood and Affect: Mood normal.         Behavior: Behavior normal.         Thought Content: Thought content normal.         Judgment: Judgment normal.             Result Review    Result Review:  I have personally reviewed the results from the time of this admission to 8/8/2021 14:59 EDT and agree with these findings:  [x]  Laboratory  [x]  Microbiology  [x]  Radiology  []  EKG/Telemetry   []  Cardiology/Vascular   []  Pathology  []  Old records  []  Other:  Most notable findings include:           Assessment/Plan      Brief Patient Summary:  Yogi Tucker is a 40 y.o. male who     aspirin, 81 mg, Oral, Daily  atorvastatin, 40 mg, Oral, Nightly  carvedilol, 6.25 mg, Oral, BID  heparin (porcine), 5,000 Units, Subcutaneous, Q8H  insulin glargine, 10 Units, Subcutaneous, Q12H  insulin lispro, 0-14 Units, Subcutaneous, 4x Daily With Meals & Nightly  sodium polystyrene, 15 g, Oral, Q6H             Active Hospital Problems:  Active Hospital Problems    Diagnosis    • **NSTEMI (non-ST elevated  myocardial infarction) (CMS/[facility])    • Cardiogenic shock (CMS/[facility])    • Severe sepsis, not felt to be septic shock    • SATHISH (acute kidney injury) (CMS/[facility])    • H/O noncompliance with medical treatment, presenting hazards to health    • Type 2 diabetes mellitus with hyperglycemia (CMS/[facility])    • Coronary artery disease    • Hypertensive disorder    • Hyperlipidemia    • Cardiomyopathy, ischemic    • Presence of automatic implantable cardioverter-defibrillator    • Heart failure (CMS/[facility])      A/P     NSTEMI (non-ST elevated myocardial infarction) Cardiomyopathy, ischemic  -Cardiology consulted, plan for cardiac cath noted.  - Status post cardiac cath revealing Status post coronary bypass surgery with 1 out of 3 grafts patent but the LAD itself also has severe disease with collaterals to the circumflex artery  Severe LV dysfunction  Aggressive treatment for heart failure and possible transfer to a facility with LVAD and transplant program ... waiting on [facility] for [facility].  -Patient currently on heparin drip  -Currently troponins trending downward  -EKG reviewed     - Likely reactive leucocytosis.  Cardiogenic shock  -Patient was given fluid bolus in ED.    -Continuous IV fluids initiated  -Dopamine drip was initiated to maintain MAP >65mmhg has since weaned off  -Lactic acid was 1.9  -Blood cultures are pending  -Negative UA  -Procalcitonin 0.36, CRP 14-42  -Antibiotics were subsequently discontinued  -Continue to monitor off antibiotics.     Ischemic cardiomyopathy/systolic heart failure with reduced ejection/presence of automatic implantable cardioverter-defibrillator   -Ischemic cardiomyopathy/systolic heart failure likely due to uncontrolled hypertension.  -Heart failure not  to be in exacerbation  -proBNP on August 3, 2021 was 13,350  -Last ejection fraction was 35% in January 2018 echocardiogram from August 3, 2021 shows left ejection fraction less than 20% with a right ventricular cavity mildly  dilated, severe mitral valve regurgitation, moderate tricuspid valve regurgitation, with no pericardial effusions noted.  -As stated above cardiology consulted     Elevated LFTs  Likely secondary to cardiogenic shock  -ALT 44,   -  -Continue to monitor liver function     Hyperlipidemia  -Patient on atorvastatin at home  -Hold until liver enzymes normalize  -Last lipid panel shows total cholesterol 130, HDL cholesterol 51, LDL cholesterol 56, VLDL cholesterol 23, triglycerides 133, LDL-HDL ratio 1.03  -Encourage lifestyle modifications to include reduced cholesterol heart healthy diet.     Hypertensive disorder  -Patient currentl borderline hypotensive requiring support to maintain adequate perfusion.  -May continue Coreg per cardiology recommendations when clinically appropriate.      Type 2 diabetes mellitus with hyperglycemia   -Patient has been noncompliant with medical management  -Hemoglobin A1c 7.5  -Holding home Metformin and glimepiride during hospitalization due to risk of acidosis.  As well as better glycemic control with sliding scale insulin during hospitalization  -Continue sliding scale        SATHISH (acute kidney injury) (CMS/HCC)  -Likely due to cardiogenic shock/hypotension  -IV fluids initiated on admission  -Avoid nephrotoxic agents  -Avoid hypotension  -Creatinine 1.76 today, BUN 40 today  -Nephrology following      H/O noncompliance with medical treatment, presenting hazards to health  -Reinforce importance of medical compliance.  Especially in the setting of current cardiovascular health and his extensive cardiac history.     This patient has been examined wearing appropriate Personal Protective Equipment 08/05/21      DVT prophylaxis:  Medical DVT prophylaxis orders are present.    CODE STATUS:    Code Status: CPR  Medical Interventions (Level of Support Prior to Arrest): Full      Disposition:  I expect patient to be discharged once cleared by cardiology service..    This patient has been  examined wearing appropriate Personal Protective Equipment and discussed with hospital infection control department. 08/08/21      Electronically signed by Seven Mcneil MD, 08/08/21, 14:59 EDT.  [facility] Hospitalist Team

## 2021-08-08 NOTE — PROGRESS NOTES
[facility] Progress Note      Patient Name: Yogi Tucker  : 1980  MRN: [id number]  Primary Care Physician:  Mey Cox  Date of admission: 8/3/2021    Subjective     Interval History:     Denies any chest pain, palpitation diaphoresis  Urinary spontaneously  No dyspnea at rest   Awaiting transferred for further cardiac evaluation     Review of Systems:   As noted above    Objective     Vitals:   Temp:  [97.5 °F (36.4 °C)-98.2 °F (36.8 °C)] 97.6 °F (36.4 °C)  Heart Rate:  [] 102  Resp:  [22] 22  BP: ()/(62-93) 92/62  Flow (L/min):  [2] 2    Intake/Output Summary (Last 24 hours) at 2021 1706  Last data filed at 2021 1300  Gross per 24 hour   Intake 635 ml   Output 1200 ml   Net -565 ml       Physical Exam:    General Appearance: alert, oriented x 3, no acute distress   Skin: warm and dry  HEENT: oral mucosa normal, nonicteric sclera  Neck: supple, no JVD  Lungs: CTA  Heart: RRR, soft systolic murmur grade 3  Abdomen: soft, nontender, nondistended  : no palpable bladder  Extremities: +  edema, cyanosis or clubbing  Neuro: normal speech and mental status     Scheduled Meds:     aspirin, 81 mg, Oral, Daily  atorvastatin, 40 mg, Oral, Nightly  carvedilol, 6.25 mg, Oral, BID  heparin (porcine), 5,000 Units, Subcutaneous, Q8H  insulin glargine, 10 Units, Subcutaneous, Q12H  insulin lispro, 0-14 Units, Subcutaneous, 4x Daily With Meals & Nightly  sodium polystyrene, 15 g, Oral, Q6H      IV Meds:        Results Reviewed:   I have personally reviewed the results from the time of this admission to 2021 17:06 EDT     Results from last 7 days   Lab Units 21  0535 21  0355 21  0012   SODIUM mmol/L 130* 135* 131*   POTASSIUM mmol/L 5.7* 5.3* 4.9   CHLORIDE mmol/L 99 101 99   CO2 mmol/L 23.0 21.0* 21.0*   BUN mg/dL 59* 47* 39*   CREATININE mg/dL 1.86* 1.67* 1.79*   CALCIUM mg/dL 8.7 8.6 8.4*   BILIRUBIN mg/dL 0.3 0.3 0.5   ALK PHOS U/L 93 99  105   ALT (SGPT) U/L 168* 175* 113*   AST (SGOT) U/L 53* 90* 96*   GLUCOSE mg/dL 276* 218* 190*       Estimated Creatinine Clearance: 67.9 mL/min (A) (by C-G formula based on SCr of 1.86 mg/dL (H)).    Results from last 7 days   Lab Units 08/08/21  0535 08/07/21  0355 08/06/21  0012   MAGNESIUM mg/dL 2.7* 2.7* 2.3   PHOSPHORUS mg/dL 3.8 4.8* 3.1       Results from last 7 days   Lab Units 08/05/21  0450   URIC ACID mg/dL 8.8*       Results from last 7 days   Lab Units 08/08/21  0535 08/07/21  0355 08/06/21  0553 08/05/21  1725 08/05/21  0450   WBC 10*3/mm3 12.50* 6.50 10.60 11.90* 12.30*   HEMOGLOBIN g/dL 12.2* 12.8* 13.1 12.9* 13.4   PLATELETS 10*3/mm3 227 199 173 194 171       Results from last 7 days   Lab Units 08/03/21  1849   INR  1.01       Assessment / Plan       ASSESSMENT:    -SATHISH vs Chronic kidney disease stage 3. Unknown baseline  Secondary to cardiorenal syndrome    .Creatinine trend 1.6-1.8 during admission . Today 1.86    . Urinalysis glucosuria with proteinuria. Images studies renal ultrasound right kidney measuring 11 cm.  Left kidney measuring 11.7 cm no hydronephrosis     . Will follow closely      . Avoid nephrotoxins      . Keep MAP > 65 . Avoid nephrotoxins  -Non-STEMI with dilated cardiomyopathy with coronary disease status post cardioverter defibrillator admitted with cardiogenic shock.  With severe MR patient underwent cardiac cath none amendable for percutaneous intervention.  Awaiting transfer for transfer evaluation  -Diabetes Mellitus type 2. On insulin regimen / hypoglycemic regimen .Hypoglycemic protocol . POC glucose 4 x day .Diabetic diet  -Hypotension secondary to dilated cardiomyopathy.  Closely monitor  -Hyperkalemia ,on renal diet low potassium and kayexalate 15 gr q 6 hours x 2     PLAN:  -Patient awaiting transfer, for further evaluation by transplant team  -Hyperkalemia ; On kayexalate , will add bumetanide 1.5 mg IV once   -Renal function currently stable  -Follow renal  function closely  -Avoid nephrotoxins  -Adjust medications to GFR    Discussed with Dr. Vargas and Dr. Tripathi       Thank you for involving us in the care of Yogi Tucker.  Please feel free to call with any questions.    Spencer Angel MD  08/08/21  17:06 EDT    [facility]  [phone]      Much of this encounter note is an electronic transcription/translation of spoken language to printed text. The electronic translation of spoken language may permit erroneous, or at times, nonsensical words or phrases to be inadvertently transcribed; Although I have reviewed the note for such errors, some may still exist.

## 2021-08-08 NOTE — PLAN OF CARE
Problem: Fall Injury Risk  Goal: Absence of Fall and Fall-Related Injury  Outcome: Ongoing, Progressing  Intervention: Identify and Manage Contributors to Fall Injury Risk  Recent Flowsheet Documentation  Taken 8/8/2021 0800 by Kate Goodwin RN  Medication Review/Management:   medications reviewed   high-risk medications identified  Intervention: Promote Injury-Free Environment  Recent Flowsheet Documentation  Taken 8/8/2021 0800 by Kate Goodwin RN  Safety Promotion/Fall Prevention:   activity supervised   fall prevention program maintained   lighting adjusted   mobility aid in reach   muscle strengthening facilitated   nonskid shoes/slippers when out of bed   room organization consistent   safety round/check completed   toileting scheduled     Problem: Adult Inpatient Plan of Care  Goal: Plan of Care Review  Outcome: Ongoing, Progressing  Goal: Patient-Specific Goal (Individualized)  Outcome: Ongoing, Progressing  Goal: Absence of Hospital-Acquired Illness or Injury  Outcome: Ongoing, Progressing  Intervention: Identify and Manage Fall Risk  Recent Flowsheet Documentation  Taken 8/8/2021 0800 by Kate Goodwin RN  Safety Promotion/Fall Prevention:   activity supervised   fall prevention program maintained   lighting adjusted   mobility aid in reach   muscle strengthening facilitated   nonskid shoes/slippers when out of bed   room organization consistent   safety round/check completed   toileting scheduled  Intervention: Prevent Skin Injury  Recent Flowsheet Documentation  Taken 8/8/2021 0800 by Kate Goodwin RN  Body Position: position changed independently  Skin Protection:   adhesive use limited   electrode sites changed   tubing/devices free from skin contact   transparent dressing maintained   pulse oximeter probe site changed  Intervention: Prevent and Manage VTE (venous thromboembolism) Risk  Recent Flowsheet Documentation  Taken 8/8/2021 0800 by Kate Goodwin RN  VTE Prevention/Management:  sequential compression devices off  Intervention: Prevent Infection  Recent Flowsheet Documentation  Taken 8/8/2021 0800 by Kate Goodwin RN  Infection Prevention:   cohorting utilized   environmental surveillance performed   equipment surfaces disinfected   hand hygiene promoted   personal protective equipment utilized   rest/sleep promoted   single patient room provided  Goal: Optimal Comfort and Wellbeing  Outcome: Ongoing, Progressing  Intervention: Provide Person-Centered Care  Recent Flowsheet Documentation  Taken 8/8/2021 0800 by Kate Goodwin RN  Trust Relationship/Rapport:   care explained   choices provided   emotional support provided   empathic listening provided   questions answered   questions encouraged   reassurance provided   thoughts/feelings acknowledged  Goal: Readiness for Transition of Care  Outcome: Ongoing, Progressing     Problem: Skin Injury Risk Increased  Goal: Skin Health and Integrity  Outcome: Ongoing, Progressing  Intervention: Optimize Skin Protection  Recent Flowsheet Documentation  Taken 8/8/2021 0800 by Kate Goodwin RN  Pressure Reduction Techniques: frequent weight shift encouraged  Pressure Reduction Devices:   pressure-redistributing mattress utilized   chair cushion utilized  Skin Protection:   adhesive use limited   electrode sites changed   tubing/devices free from skin contact   transparent dressing maintained   pulse oximeter probe site changed     Problem: Diabetes Comorbidity  Goal: Blood Glucose Level Within Desired Range  Outcome: Ongoing, Progressing     Problem: Heart Failure Comorbidity  Goal: Maintenance of Heart Failure Symptom Control  Outcome: Ongoing, Progressing  Intervention: Maintain Heart Failure-Management Strategies  Recent Flowsheet Documentation  Taken 8/8/2021 0800 by Kate Goodwin RN  Medication Review/Management:   medications reviewed   high-risk medications identified     Problem: Hypertension Comorbidity  Goal: Blood Pressure in  Desired Range  Outcome: Ongoing, Progressing  Intervention: Maintain Hypertension-Management Strategies  Recent Flowsheet Documentation  Taken 8/8/2021 0800 by Kate Goodwin RN  Medication Review/Management:   medications reviewed   high-risk medications identified   Goal Outcome Evaluation:

## 2021-08-08 NOTE — PROGRESS NOTES
"PULMONARY CRITICAL CARE Progress  NOTE      PATIENT IDENTIFICATION:  Name: Yogi Tucker  MRN: [id number]  :  1980     Age: 40 y.o.  Sex: male    DATE OF Note:  2021   Referring Physician: Seven Mcneil MD                  Subjective:   No chest pain or SOA  Awaiting transfer to [facility] for transplant [id number] LVAD  No further N/V  SR with BBB  Ambulating around the room  Heparin SQ  Kayexelate x2 today  Patient is very angry and frustrated that he has not moved to another facility and feels he is not progressing    Objective:  tMax 24 hrs: Temp (24hrs), Av.9 °F (36.6 °C), Min:97.5 °F (36.4 °C), Max:98.3 °F (36.8 °C)      Vitals Ranges:   Temp:  [97.5 °F (36.4 °C)-98.3 °F (36.8 °C)] 97.6 °F (36.4 °C)  Heart Rate:  [] 100  Resp:  [22] 22  BP: ()/(66-93) 106/76    Intake and Output Last 3 Shifts:   I/O last 3 completed shifts:  In: 1565.6 [P.O.:1115; I.V.:450.6]  Out: 1200 [Urine:1200]    Exam:  /76   Pulse 100   Temp 97.6 °F (36.4 °C)   Resp 22   Ht 182.9 cm (72\")   Wt 113 kg (248 lb 10.9 oz)   SpO2 99%   BMI 33.73 kg/m²     Constitutional:  Well developed, well nourished, no acute distress, non-toxic appearance   Eyes:  PERRL, conjunctiva normal, EOMI   HENT:  Atraumatic, external ears normal, nose normal. Neck-normal range of motion, no tenderness  Respiratory: Diminished bases, no rhonchi or wheezing, non-labored respirations without accessory muscle use  Cardiovascular: Sinus tachycardia, + systolic murmur, no gallops, no rubs   GI:  Soft, nondistended, normal bowel sounds, nontender, no rebound or guarding   : Deferred  Musculoskeletal: Pitting BLE edema, no clubbing, no deformities  Integument:  Well hydrated, no rash   Neurologic:  Alert & oriented x 3, no lateralizing deficits  Psychiatric:  Speech and behavior appropriate         Medications:    Current Facility-Administered Medications:   •  acetaminophen (TYLENOL) tablet 650 mg, 650 mg, Oral, Q4H " PRN **OR** acetaminophen (TYLENOL) suppository 650 mg, 650 mg, Rectal, Q4H PRN, Carl Appiah MD  •  aluminum-magnesium hydroxide-simethicone (MAALOX MAX) 400-400-40 MG/5ML suspension 15 mL, 15 mL, Oral, Q6H PRN, Carl Appiah MD  •  aspirin EC tablet 81 mg, 81 mg, Oral, Daily, Carl Appiah MD, 81 mg at 08/08/21 0957  •  atorvastatin (LIPITOR) tablet 40 mg, 40 mg, Oral, Nightly, Carl Appiah MD, 40 mg at 08/07/21 1924  •  atropine sulfate injection 0.5 mg, 0.5 mg, Intravenous, Q5 Min PRN, Carl Appiah MD  •  dextrose (D50W) 25 g/ 50mL Intravenous Solution 25 g, 25 g, Intravenous, Q15 Min PRN, Carl Appiah MD  •  dextrose (GLUTOSE) oral gel 15 g, 15 g, Oral, Q15 Min PRN, Carl Appiah MD  •  glucagon (human recombinant) (GLUCAGEN DIAGNOSTIC) injection 1 mg, 1 mg, Subcutaneous, Q15 Min PRN, Carl Appiah MD  •  heparin (porcine) 5000 UNIT/ML injection 5,000 Units, 5,000 Units, Subcutaneous, Q8H, Day, Tatiana, APRN, 5,000 Units at 08/08/21 0537  •  insulin glargine (LANTUS, SEMGLEE) injection 10 Units, 10 Units, Subcutaneous, Nightly, Day, Tatiana, APRN, 10 Units at 08/07/21 1931  •  insulin lispro (ADMELOG) injection 0-14 Units, 0-14 Units, Subcutaneous, 4x Daily With Meals & Nightly, 8 Units at 08/08/21 0957 **AND** [DISCONTINUED] insulin lispro (ADMELOG) injection 0-14 Units, 0-14 Units, Subcutaneous, 4x Daily With Meals & Nightly, Day, Tatiana, APRN  •  nitroglycerin (NITROSTAT) SL tablet 0.4 mg, 0.4 mg, Sublingual, Q5 Min PRN, Carl Appiah MD  •  ondansetron (ZOFRAN) tablet 4 mg, 4 mg, Oral, Q6H PRN **OR** ondansetron (ZOFRAN) injection 4 mg, 4 mg, Intravenous, Q6H PRN, Carl Appiah MD, 4 mg at 08/06/21 0842  •  sodium chloride 0.9 % infusion 250 mL, 250 mL, Intravenous, Once PRN, Carl Appiah MD  •  sodium chloride 0.9 % infusion, 100 mL/hr, Intravenous, Continuous, Carl Appiah MD, Last Rate: 100 mL/hr at 08/06/21 2145, 100 mL/hr at 08/06/21 2145    Data Review:  All labs (24hrs):   Recent Results  (from the past 24 hour(s))   Troponin    Collection Time: 08/07/21  4:08 PM    Specimen: Blood   Result Value Ref Range    Troponin T 2.480 (C) 0.000 - 0.030 ng/mL   POC Glucose Once    Collection Time: 08/07/21  7:22 PM    Specimen: Blood   Result Value Ref Range    Glucose 286 (H) 70 - 105 mg/dL   Troponin    Collection Time: 08/07/21 11:16 PM    Specimen: Blood   Result Value Ref Range    Troponin T 2.670 (C) 0.000 - 0.030 ng/mL   Troponin    Collection Time: 08/08/21  5:35 AM    Specimen: Blood   Result Value Ref Range    Troponin T 3.320 (C) 0.000 - 0.030 ng/mL   Magnesium    Collection Time: 08/08/21  5:35 AM    Specimen: Blood   Result Value Ref Range    Magnesium 2.7 (H) 1.6 - 2.6 mg/dL   Phosphorus    Collection Time: 08/08/21  5:35 AM    Specimen: Blood   Result Value Ref Range    Phosphorus 3.8 2.5 - 4.5 mg/dL   Comprehensive Metabolic Panel    Collection Time: 08/08/21  5:35 AM    Specimen: Blood   Result Value Ref Range    Glucose 276 (H) 65 - 99 mg/dL    BUN 59 (H) 6 - 20 mg/dL    Creatinine 1.86 (H) 0.76 - 1.27 mg/dL    Sodium 130 (L) 136 - 145 mmol/L    Potassium 5.7 (H) 3.5 - 5.2 mmol/L    Chloride 99 98 - 107 mmol/L    CO2 23.0 22.0 - 29.0 mmol/L    Calcium 8.7 8.6 - 10.5 mg/dL    Total Protein 6.4 6.0 - 8.5 g/dL    Albumin 3.30 (L) 3.50 - 5.20 g/dL    ALT (SGPT) 168 (H) 1 - 41 U/L    AST (SGOT) 53 (H) 1 - 40 U/L    Alkaline Phosphatase 93 39 - 117 U/L    Total Bilirubin 0.3 0.0 - 1.2 mg/dL    eGFR Non African Amer 40 (L) >60 mL/min/1.73    Globulin 3.1 gm/dL    A/G Ratio 1.1 g/dL    BUN/Creatinine Ratio 31.7 (H) 7.0 - 25.0    Anion Gap 8.0 5.0 - 15.0 mmol/L   CBC Auto Differential    Collection Time: 08/08/21  5:35 AM    Specimen: Blood   Result Value Ref Range    WBC 12.50 (H) 3.40 - 10.80 10*3/mm3    RBC 4.05 (L) 4.14 - 5.80 10*6/mm3    Hemoglobin 12.2 (L) 13.0 - 17.7 g/dL    Hematocrit 36.9 (L) 37.5 - 51.0 %    MCV 91.1 79.0 - 97.0 fL    MCH 30.0 26.6 - 33.0 pg    MCHC 33.0 31.5 - 35.7 g/dL     RDW 13.8 12.3 - 15.4 %    RDW-SD 43.8 37.0 - 54.0 fl    MPV 9.7 6.0 - 12.0 fL    Platelets 227 140 - 450 10*3/mm3    Neutrophil % 82.1 (H) 42.7 - 76.0 %    Lymphocyte % 8.8 (L) 19.6 - 45.3 %    Monocyte % 8.9 5.0 - 12.0 %    Eosinophil % 0.0 (L) 0.3 - 6.2 %    Basophil % 0.2 0.0 - 1.5 %    Neutrophils, Absolute 10.30 (H) 1.70 - 7.00 10*3/mm3    Lymphocytes, Absolute 1.10 0.70 - 3.10 10*3/mm3    Monocytes, Absolute 1.10 (H) 0.10 - 0.90 10*3/mm3    Eosinophils, Absolute 0.00 0.00 - 0.40 10*3/mm3    Basophils, Absolute 0.00 0.00 - 0.20 10*3/mm3    nRBC 0.2 0.0 - 0.2 /100 WBC   POC Glucose Once    Collection Time: 21  7:52 AM    Specimen: Blood   Result Value Ref Range    Glucose 251 (H) 70 - 105 mg/dL        Imaging:  Cardiac Catheterization/Vascular Study  Heart Cath Report    NAME:              Yogi Tucker  :                1980  AGE/SEX:        40 y.o. male  MRN:                [id number]  ADM DATE:      @@  DOS:                     Pre-Procedure Notes  H&P Performed  [x]  Yes []  No       []  N/A    Indications:   []  ACS <= 24 HRS   [x]  ACS >24 HRS   [x]  New Onset Angina <= 2 mos   []  Worsening Angina   []  Resuscitated Cardiac Arrest   []  Angina on Exertion:   []  Suspected CAD   []  Valvular Disease   []  Pericardial Disease   []  Cardiac Arrythmia   [x]  Cardiomyopathy   [x]  LV Dysfunction   []  Syncope   []  Post Cardiac Transplant   []  Eval. For Exercise Clearance   []  Abnormal Stress Test    []  Other   []  Pre-Operative Evaluation        If Pre-Op Eval:   Evaluation for Surgery Type:  []  Cardiac Surgery   []  Non-Cardiac   Surgery   Functional Capacity:  []  <4 METS  []  >=4 METS w/o symptoms          []  >= 4 METS with symptoms  []  Unknown   Surgical Risk:  []  Low  []  Intermediate         []  High Risk: Vascular  []  High Risk Non-Vascular    Risks, Benefits, & Complications Discussed:  [x]  Yes  []  No  []  N/A    Questions Answered:  [x]  Yes  []  No  []   N/A    Consent Obtained:  [x]  Yes  []  No  []  N/A    CHF: [x]  Yes  []  No     If Yes:  Newly Diagnosed?  []  Yes  [x]  No   If Yes:  HF Type:  []  Diastolic  [x]  Systolic  []  Unknown     Procedure Description  The patient underwent successful [x]  Left  []  Right  []  Left & Right    Heart catheterization and coronary angiography via the   [x]  Femoral approach  []  Radial approach  []  Brachial approach    Procedure Narrative:   Informed consent was obtained from the patient after explaining risks and   benefits.  Patient was brought to the cardiac catheterization laboratory   and placed on the table in the usual fashion.  Right groin was shaved and   prepped in sterile fashion. Moderate conscious sedation was given   utilizing IV Versed and fentanyl administered by RN with continuous EKG   oximetry and hemodynamic monitoring supervised by me throughout the entire   case, conscious sedation time was 30 minutes.  I was present with the   patient for the duration of moderate sedation and supervised staff who had   no other duties and monitored the patient for the entire procedure patient   had Proctor 2-3 sedation scale. 2% lidocaine was used for local anesthesia   to the right groin.  A total of 20 cc was used.  A 6 Citizen of [place] sheath was   placed in the right femoral artery.  A  6 [place] JR4 catheter and a 6   Citizen [place] JL4 catheter was used for the procedure.  After the cardiac   catheterization is complete, all the catheters and sheath were removed.    Patient tolerated the procedure very well.  No complications noted.    Hemodynamic    LVEDP:   Estimated EF %:      Initial Aortic Pressure: 90/70    AV Gradient:      Rt. Heart Pressure:    Wall Motion:      Dominance:  []  Left  [x]  Right  []  Co-Dominant      Coronary Arteriography: (Please Code highest degree of stenosis)    Left Main %:   70%  Proximal LAD %: 100%  Mid/Distal LAD %: 100%  LCX %: 90% proximal.  OM 2 is 100% occluded  Ramus:    RCA %: 100%  Lima %:  LIMA to the LAD is patent and the distal LAD has 80 to 90% disease  SVG(s) %: SVG to the RCA is occluded  SVG to the marginal branch is occluded    Complications: No complications    Estimated Blood Loss:  None    Impression and Recommendation: Severe three-vessel coronary disease  Status post coronary bypass surgery with 1 out of 3 grafts patent but the   LAD itself also has severe disease with collaterals to the circumflex   artery  Severe LV dysfunction  Aggressive treatment for heart failure and possible transfer to a facility   with LVAD and transplant program  Discussed with patient and family.    Electronically signed by Carl Appiah MD, 08/06/21, 6:52 PM EDT       ASSESSMENT:    NSTEMI (non-ST elevated myocardial infarction) (CMS/[facility])    Cardiomyopathy, ischemic    Coronary artery disease    Heart failure (CMS/[facility])  DEEPA    Hyperlipidemia    Hypertensive disorder    Presence of automatic implantable cardioverter-defibrillator    Cardiogenic shock (CMS/[facility])    Severe sepsis, not felt to be septic shock    Type 2 diabetes mellitus with hyperglycemia (CMS/[facility])    SATHISH (acute kidney injury) (CMS/[facility])    H/O noncompliance with medical treatment, presenting hazards to health     PLAN:  Awaiting transfer to [place] for transplant work-up  We will try other hospitals with transplant facility however bed availability is difficult at this time  O2 support wean down keep sat more than 88  Consider BIPAP while sleeping  Bronchodilator  Inhaled corticosteroids  Electrolytes  Glycemic control with sliding scale insulin and Lantus  DVT prophylaxis Heparin  GI prophylaxis not indicated, tolerating p.o. intake    Nephrology following  Cardiology following  Hospitalist following for primary medical management    Total Critical care time in direct medical management (   ) minutes  Alissa Vargas MD. D, ABSM.     8/8/2021  10:40 EDT     I personally have examined  and interviewed the patient. I have reviewed the history,  data, problems, assessment and plan with our NP.  Critical care time in direct medical management (  34 ) minutes  Electronically signed by Alissa Vargas MD, D,ABS, 08/08/21, 8:14 PM EDT.

## 2021-08-08 NOTE — PROGRESS NOTES
CC--- severe multivessel coronary artery disease  Severe LV dysfunction and severe MR and TR      Sub--post cardiac catheterization with severe multivessel disease being transferred to ICU for transplant evaluation  Denies any angina or dyspnea        Past Medical History:   Diagnosis Date   • Coronary artery disease    • Hyperlipidemia    • Hypertension      Past Surgical History:   Procedure Laterality Date   • CORONARY ARTERY BYPASS GRAFT     • LEFT HEART CATH       Family History   Problem Relation Age of Onset   • No Known Problems Mother    • No Known Problems Father      Social History     Tobacco Use   • Smoking status: Never Smoker   • Smokeless tobacco: Former User     Types: Chew   Substance Use Topics   • Alcohol use: Not on file   • Drug use: Not on file     Review of Systems   General:  positive for fatigue and tiredness  Eyes: No redness  Cardiovascular: No chest pain, no palpitations  Respiratory:   positive for class 3 shortness of breath  Gastrointestinal: No nausea or vomiting, bleeding  Genitourinary: no hematuria or dysuria  Musculoskeletal: No arthralgia or myalgia  Skin: No rash  Neurologic: No numbness, tingling, syncope  Hematologic/Lymphatic: No abnormal bleeding      Physical Exam  VITALS REVIEWED--blood pressure 104/72 pulse rate is 82 patient afebrile respiration 12 times a minute    General:      well developed, well nourished, in no acute distress.    Head:      normocephalic and atraumatic.    Eyes:      PERRL/EOM intact, conjunctiva and sclera clear with out nystagmus.    Neck:      no masses, thyromegaly,  trachea central with normal respiratory effort and PMI displaced laterally  Lungs:      clear bilaterally to auscultation.    Heart:       Sinus rhythm with a gallop without any rubs --frequent PVCs noted  Msk:      no deformity or scoliosis noted of thoracic or lumbar spine.    Pulses:      pulses normal in all 4 extremities.    Extremities:       no cyanosis or clubbing--1+  bilateral edema noted     Neurologic:      no focal deficits.   alert oriented x3  Skin:      intact without lesions or rashes.    Psych:      alert and cooperative; normal mood and affect; normal attention span and concentration.          CBC    Results from last 7 days   Lab Units 08/08/21  0535 08/07/21  0355 08/06/21  0553 08/05/21  1725 08/05/21  0450 08/04/21  0502 08/03/21  1849   WBC 10*3/mm3 12.50* 6.50 10.60 11.90* 12.30* 11.60* 14.50*   HEMOGLOBIN g/dL 12.2* 12.8* 13.1 12.9* 13.4 13.2 13.8   PLATELETS 10*3/mm3 227 199 173 194 171 154 181     BMP   Results from last 7 days   Lab Units 08/08/21  0535 08/07/21  0355 08/06/21  0012 08/05/21  1725 08/05/21  0450 08/04/21  1225 08/04/21  0502 08/03/21  1425 08/03/21  1425   SODIUM mmol/L 130* 135* 131* 134* 136 137 137   < > 135*   POTASSIUM mmol/L 5.7* 5.3* 4.9 4.9 5.0 5.0 4.4   < > 4.8   CHLORIDE mmol/L 99 101 99 101 101 101 101   < > 98   CO2 mmol/L 23.0 21.0* 21.0* 23.0 24.0 23.0 25.0   < > 26.0   BUN mg/dL 59* 47* 39* 40* 37* 31* 29*   < > 25*   CREATININE mg/dL 1.86* 1.67* 1.79* 1.76* 1.80* 1.79* 1.72*   < > 1.89*   GLUCOSE mg/dL 276* 218* 190* 136* 168* 137* 152*   < > 196*   MAGNESIUM mg/dL 2.7* 2.7* 2.3  --  2.1  --  2.0  --  2.0   PHOSPHORUS mg/dL 3.8 4.8* 3.1  --  3.4  --  2.8  --  3.5    < > = values in this interval not displayed.     Infection   Results from last 7 days   Lab Units 08/03/21  1444 08/03/21  1425   BLOODCX  No growth at 4 days No growth at 4 days   PROCALCITONIN ng/mL  --  0.36*     CMP   Results from last 7 days   Lab Units 08/08/21  0535 08/07/21  0355 08/06/21  0012 08/05/21  1725 08/05/21  0450 08/04/21  1225 08/04/21  0502 08/03/21  1425 08/03/21  1425   SODIUM mmol/L 130* 135* 131* 134* 136 137 137   < > 135*   POTASSIUM mmol/L 5.7* 5.3* 4.9 4.9 5.0 5.0 4.4   < > 4.8   CHLORIDE mmol/L 99 101 99 101 101 101 101   < > 98   CO2 mmol/L 23.0 21.0* 21.0* 23.0 24.0 23.0 25.0   < > 26.0   BUN mg/dL 59* 47* 39* 40* 37* 31* 29*   < >  25*   CREATININE mg/dL 1.86* 1.67* 1.79* 1.76* 1.80* 1.79* 1.72*   < > 1.89*   GLUCOSE mg/dL 276* 218* 190* 136* 168* 137* 152*   < > 196*   ALBUMIN g/dL 3.30* 3.20* 3.00*  --  3.60  --  3.60  --  3.80   BILIRUBIN mg/dL 0.3 0.3 0.5  --  0.6  --  0.7  --  0.6   ALK PHOS U/L 93 99 105  --  100  --  96  --  95   AST (SGOT) U/L 53* 90* 96*  --  151*  --  110*  --  126*   ALT (SGPT) U/L 168* 175* 113*  --  105*  --  46*  --  44*    < > = values in this interval not displayed.         A/p      Severe multivessel coronary artery disease with no further targets  Severe left dysfunction  Severe MR  ICD in situ  Class III systolic heart failure chronic  Cardiorenal syndrome  Patient awaiting transfer to  for transplant evaluation--no beds available at  and after discussing with patient and family and Dr. Appiah--will contact [facility] transplant services  Subcu heparin for DVT prophylaxis  Continue supportive management  Clinically stable without angina  Stop IV fluids  Nephrology consult requested  Restart Coreg at lower dose  Entresto for now because of hyperkalemia      Electronically signed by Yoseph Tripathi MD, 08/08/21, 12:58 PM EDT.

## 2021-08-09 VITALS
WEIGHT: 248.68 LBS | HEIGHT: 72 IN | DIASTOLIC BLOOD PRESSURE: 95 MMHG | HEART RATE: 92 BPM | RESPIRATION RATE: 16 BRPM | OXYGEN SATURATION: 98 % | BODY MASS INDEX: 33.68 KG/M2 | TEMPERATURE: 97.7 F | SYSTOLIC BLOOD PRESSURE: 117 MMHG

## 2021-08-09 LAB
ALBUMIN SERPL-MCNC: 3.3 G/DL (ref 3.5–5.2)
ALBUMIN/GLOB SERPL: 1.1 G/DL
ALP SERPL-CCNC: 95 U/L (ref 39–117)
ALT SERPL W P-5'-P-CCNC: 274 U/L (ref 1–41)
ANION GAP SERPL CALCULATED.3IONS-SCNC: 11 MMOL/L (ref 5–15)
AST SERPL-CCNC: 152 U/L (ref 1–40)
BASOPHILS # BLD AUTO: 0 10*3/MM3 (ref 0–0.2)
BASOPHILS NFR BLD AUTO: 0.4 % (ref 0–1.5)
BILIRUB SERPL-MCNC: 0.4 MG/DL (ref 0–1.2)
BUN SERPL-MCNC: 58 MG/DL (ref 6–20)
BUN/CREAT SERPL: 34.3 (ref 7–25)
CALCIUM SPEC-SCNC: 8.6 MG/DL (ref 8.6–10.5)
CHLORIDE SERPL-SCNC: 100 MMOL/L (ref 98–107)
CO2 SERPL-SCNC: 25 MMOL/L (ref 22–29)
CREAT SERPL-MCNC: 1.69 MG/DL (ref 0.76–1.27)
DEPRECATED RDW RBC AUTO: 44.6 FL (ref 37–54)
EOSINOPHIL # BLD AUTO: 0.1 10*3/MM3 (ref 0–0.4)
EOSINOPHIL NFR BLD AUTO: 0.6 % (ref 0.3–6.2)
ERYTHROCYTE [DISTWIDTH] IN BLOOD BY AUTOMATED COUNT: 14.2 % (ref 12.3–15.4)
GFR SERPL CREATININE-BSD FRML MDRD: 45 ML/MIN/1.73
GLOBULIN UR ELPH-MCNC: 3.1 GM/DL
GLUCOSE BLDC GLUCOMTR-MCNC: 123 MG/DL (ref 70–105)
GLUCOSE BLDC GLUCOMTR-MCNC: 180 MG/DL (ref 70–105)
GLUCOSE SERPL-MCNC: 134 MG/DL (ref 65–99)
HCT VFR BLD AUTO: 37.3 % (ref 37.5–51)
HGB BLD-MCNC: 12.2 G/DL (ref 13–17.7)
LYMPHOCYTES # BLD AUTO: 1.7 10*3/MM3 (ref 0.7–3.1)
LYMPHOCYTES NFR BLD AUTO: 17.5 % (ref 19.6–45.3)
MAGNESIUM SERPL-MCNC: 2.3 MG/DL (ref 1.6–2.6)
MCH RBC QN AUTO: 29.3 PG (ref 26.6–33)
MCHC RBC AUTO-ENTMCNC: 32.6 G/DL (ref 31.5–35.7)
MCV RBC AUTO: 89.9 FL (ref 79–97)
MONOCYTES # BLD AUTO: 1.2 10*3/MM3 (ref 0.1–0.9)
MONOCYTES NFR BLD AUTO: 11.9 % (ref 5–12)
NEUTROPHILS NFR BLD AUTO: 6.9 10*3/MM3 (ref 1.7–7)
NEUTROPHILS NFR BLD AUTO: 69.6 % (ref 42.7–76)
NRBC BLD AUTO-RTO: 0.2 /100 WBC (ref 0–0.2)
PHOSPHATE SERPL-MCNC: 4.1 MG/DL (ref 2.5–4.5)
PLATELET # BLD AUTO: 256 10*3/MM3 (ref 140–450)
PMV BLD AUTO: 9.7 FL (ref 6–12)
POTASSIUM SERPL-SCNC: 4.6 MMOL/L (ref 3.5–5.2)
PROT SERPL-MCNC: 6.4 G/DL (ref 6–8.5)
RBC # BLD AUTO: 4.15 10*6/MM3 (ref 4.14–5.8)
SODIUM SERPL-SCNC: 136 MMOL/L (ref 136–145)
TROPONIN T SERPL-MCNC: 3.85 NG/ML (ref 0–0.03)
TROPONIN T SERPL-MCNC: 4.41 NG/ML (ref 0–0.03)
TROPONIN T SERPL-MCNC: 4.44 NG/ML (ref 0–0.03)
WBC # BLD AUTO: 10 10*3/MM3 (ref 3.4–10.8)

## 2021-08-09 PROCEDURE — 99232 SBSQ HOSP IP/OBS MODERATE 35: CPT | Performed by: INTERNAL MEDICINE

## 2021-08-09 PROCEDURE — 84100 ASSAY OF PHOSPHORUS: CPT | Performed by: INTERNAL MEDICINE

## 2021-08-09 PROCEDURE — 63710000001 INSULIN GLARGINE PER 5 UNITS: Performed by: NURSE PRACTITIONER

## 2021-08-09 PROCEDURE — 85025 COMPLETE CBC W/AUTO DIFF WBC: CPT | Performed by: INTERNAL MEDICINE

## 2021-08-09 PROCEDURE — 25010000002 HEPARIN (PORCINE) PER 1000 UNITS: Performed by: NURSE PRACTITIONER

## 2021-08-09 PROCEDURE — 99239 HOSP IP/OBS DSCHRG MGMT >30: CPT | Performed by: HOSPITALIST

## 2021-08-09 PROCEDURE — 83735 ASSAY OF MAGNESIUM: CPT | Performed by: INTERNAL MEDICINE

## 2021-08-09 PROCEDURE — 84484 ASSAY OF TROPONIN QUANT: CPT | Performed by: NURSE PRACTITIONER

## 2021-08-09 PROCEDURE — 82962 GLUCOSE BLOOD TEST: CPT

## 2021-08-09 PROCEDURE — 63710000001 INSULIN LISPRO (HUMAN) PER 5 UNITS: Performed by: NURSE PRACTITIONER

## 2021-08-09 PROCEDURE — 80053 COMPREHEN METABOLIC PANEL: CPT | Performed by: INTERNAL MEDICINE

## 2021-08-09 RX ORDER — BUMETANIDE 1 MG/1
1 TABLET ORAL 2 TIMES DAILY
Start: 2021-08-09

## 2021-08-09 RX ORDER — INSULIN GLARGINE 100 [IU]/ML
10 INJECTION, SOLUTION SUBCUTANEOUS EVERY 12 HOURS SCHEDULED
Refills: 12
Start: 2021-08-09

## 2021-08-09 RX ORDER — CARVEDILOL 6.25 MG/1
12.5 TABLET ORAL 2 TIMES DAILY
Status: DISCONTINUED | OUTPATIENT
Start: 2021-08-09 | End: 2021-08-09 | Stop reason: HOSPADM

## 2021-08-09 RX ORDER — ISOPROPYL ALCOHOL 700 MG/ML
1 CLOTH TOPICAL DAILY
Qty: 100 EACH | Refills: 2 | Status: SHIPPED | OUTPATIENT
Start: 2021-08-09

## 2021-08-09 RX ORDER — CARVEDILOL 6.25 MG/1
6.25 TABLET ORAL ONCE
Status: COMPLETED | OUTPATIENT
Start: 2021-08-09 | End: 2021-08-09

## 2021-08-09 RX ORDER — BUMETANIDE 1 MG/1
1 TABLET ORAL 2 TIMES DAILY
Status: DISCONTINUED | OUTPATIENT
Start: 2021-08-09 | End: 2021-08-09 | Stop reason: HOSPADM

## 2021-08-09 RX ORDER — BLOOD SUGAR DIAGNOSTIC
1 STRIP MISCELLANEOUS DAILY
Qty: 100 EACH | Refills: 2 | Status: SHIPPED | OUTPATIENT
Start: 2021-08-09

## 2021-08-09 RX ORDER — ATORVASTATIN CALCIUM 40 MG/1
40 TABLET, FILM COATED ORAL NIGHTLY
Start: 2021-08-09

## 2021-08-09 RX ORDER — CARVEDILOL 12.5 MG/1
12.5 TABLET ORAL 2 TIMES DAILY
Start: 2021-08-09 | End: 2022-04-18

## 2021-08-09 RX ORDER — LANCETS 30 GAUGE
1 EACH MISCELLANEOUS DAILY
Qty: 100 EACH | Refills: 2 | Status: SHIPPED | OUTPATIENT
Start: 2021-08-09

## 2021-08-09 RX ADMIN — INSULIN GLARGINE 10 UNITS: 100 INJECTION, SOLUTION SUBCUTANEOUS at 08:38

## 2021-08-09 RX ADMIN — CARVEDILOL 6.25 MG: 6.25 TABLET, FILM COATED ORAL at 08:38

## 2021-08-09 RX ADMIN — INSULIN LISPRO 3 UNITS: 100 INJECTION, SOLUTION INTRAVENOUS; SUBCUTANEOUS at 11:27

## 2021-08-09 RX ADMIN — HEPARIN SODIUM 5000 UNITS: 5000 INJECTION INTRAVENOUS; SUBCUTANEOUS at 14:08

## 2021-08-09 RX ADMIN — ASPIRIN 81 MG: 81 TABLET, COATED ORAL at 08:38

## 2021-08-09 RX ADMIN — MIDODRINE HYDROCHLORIDE 10 MG: 5 TABLET ORAL at 08:37

## 2021-08-09 RX ADMIN — CARVEDILOL 6.25 MG: 6.25 TABLET, FILM COATED ORAL at 11:27

## 2021-08-09 RX ADMIN — HEPARIN SODIUM 5000 UNITS: 5000 INJECTION INTRAVENOUS; SUBCUTANEOUS at 05:53

## 2021-08-09 RX ADMIN — BUMETANIDE 1 MG: 1 TABLET ORAL at 08:37

## 2021-08-09 NOTE — PLAN OF CARE
Goal Outcome Evaluation:  Plan of Care Reviewed With: patient        Progress: no change  Outcome Summary: Patient denies any chest pain this shift. Patient has developed hives this evening and Benadryl was given. Shortly after Benadryl Patient rhythm changed to Bigeminy. Patient is only perfusing every other beat with pulse palpation. Perfusing pulse approximately 51 bpm. Blood pressure stable. Urine output increased with Bumex. Will continiue to monitor closely. Awaiting bed placement at either [facility] or [facility] for transplant.

## 2021-08-09 NOTE — CASE MANAGEMENT/SOCIAL WORK
Continued Stay Note  SUGAR Aguiar     Patient Name: Yogi Tucker  MRN: [id number]  Today's Date: 8/9/2021    Admit Date: 8/3/2021    Discharge Plan     Row Name 08/09/21 0929       Plan    Plan  D/C Plan :Transfer to   or [facility] to be worked up for a heart transplant .        Discharge Codes    No documentation.       Expected Discharge Date and Time     Expected Discharge Date Expected Discharge Time    Aug 8, 2021             Cheri Wilkinson RN

## 2021-08-09 NOTE — DISCHARGE SUMMARY
[facility] Medicine Services  DISCHARGE SUMMARY    Patient Name: Yogi Tucker  : 1980  MRN: [id number]    Date of Admission: 8/3/2021  Date of Discharge:  2021  Primary Care Physician: Mey Cox      Presenting Problem:   Cardiogenic shock (CMS/[facility]) [R57.0]  Elevated troponin [R77.8]  Acute on chronic combined systolic and diastolic congestive heart failure (CMS/[facility]) [I50.43]    Active and Resolved Hospital Problems:  Active Hospital Problems    Diagnosis POA   • **NSTEMI (non-ST elevated myocardial infarction) (CMS/[facility]) [I21.4] Yes   • Cardiogenic shock (CMS/[facility]) [R57.0] Yes   • Severe sepsis, not felt to be septic shock [A41.9, R65.20] Yes   • SATHISH (acute kidney injury) (CMS/[facility]) [N17.9] Yes   • H/O noncompliance with medical treatment, presenting hazards to health [Z91.19] Not Applicable   • Type 2 diabetes mellitus with hyperglycemia (CMS/[facility]) [E11.65] Yes   • Coronary artery disease [I25.10] Yes   • Hypertensive disorder [I10] Yes   • Hyperlipidemia [E78.5] Yes   • Cardiomyopathy, ischemic [I25.5] Yes   • Presence of automatic implantable cardioverter-defibrillator [Z95.810] Yes   • Heart failure (CMS/[facility]) [I50.9] Yes      Resolved Hospital Problems   No resolved problems to display.         Hospital Course     Hospital Course by problem list.    NSTEMI (non-ST elevated myocardial infarction) Cardiomyopathy, ischemic  -Cardiology consulted, plan for cardiac cath noted.  - Status post cardiac cath revealing Status post coronary bypass surgery with 1 out of 3 grafts patent but the LAD itself also has severe disease with collaterals to the circumflex artery  Severe LV dysfunction  Aggressive treatment for heart failure and possible transfer to a facility with LVAD and transplant program ... waiting on bed for the patient to go to .  -Patient currently on heparin drip  -Currently troponins trending downward  -EKG reviewed  - Patient will be transferred to  [facility] for further management.     - Likely reactive leucocytosis.  Cardiogenic shock  -Patient was given fluid bolus in ED.    -Continuous IV fluids initiated  -Dopamine drip was initiated to maintain MAP >65mmhg has since weaned off  -Lactic acid was 1.9  -Blood cultures are pending  -Negative UA  -Procalcitonin 0.36, CRP 14-42  -Antibiotics were subsequently discontinued  -Continue to monitor off antibiotics.     Ischemic cardiomyopathy/systolic heart failure with reduced ejection/presence of automatic implantable cardioverter-defibrillator   -Ischemic cardiomyopathy/systolic heart failure likely due to uncontrolled hypertension.  -Heart failure not  to be in exacerbation  -proBNP on August 3, 2021 was 13,350  -Last ejection fraction was 35% in January 2018 echocardiogram from August 3, 2021 shows left ejection fraction less than 20% with a right ventricular cavity mildly dilated, severe mitral valve regurgitation, moderate tricuspid valve regurgitation, with no pericardial effusions noted.  -As stated above cardiology consulted     Elevated LFTs  Likely secondary to cardiogenic shock  -ALT 44,   -  -Continue to monitor liver function     Hyperlipidemia  -Patient on atorvastatin at home  -Hold until liver enzymes normalize  -Last lipid panel shows total cholesterol 130, HDL cholesterol 51, LDL cholesterol 56, VLDL cholesterol 23, triglycerides 133, LDL-HDL ratio 1.03  -Encourage lifestyle modifications to include reduced cholesterol heart healthy diet.     Hypertensive disorder  -Patient currentl borderline hypotensive requiring support to maintain adequate perfusion.  -May continue Coreg per cardiology recommendations when clinically appropriate.      Type 2 diabetes mellitus with hyperglycemia   -Patient has been noncompliant with medical management  -Hemoglobin A1c 7.5  -Holding home Metformin and glimepiride during hospitalization due to risk of acidosis.  As well as better glycemic  control with sliding scale insulin during hospitalization  -Continue sliding scale        SATHISH (acute kidney injury) (CMS/HCC)  -Likely due to cardiogenic shock/hypotension  -IV fluids initiated on admission  -Avoid nephrotoxic agents  -Avoid hypotension  -Creatinine 1.76 today, BUN 40 today  -Nephrology following      H/O noncompliance with medical treatment, presenting hazards to health  -Reinforce importance of medical compliance.  Especially in the setting of current cardiovascular health and his extensive cardiac history.        DISCHARGE Follow up.  Pt transferred to [facility] as per arrangement for further management.    Reasons For Change In Medications and Indications for New Medications:      Day of Discharge     Vital Signs:  Temp:  [97.2 °F (36.2 °C)-98.2 °F (36.8 °C)] 97.7 °F (36.5 °C)  Heart Rate:  [] 113  Resp:  [16-18] 16  BP: ()/() 129/93  Flow (L/min):  [2] 2    Physical Exam:  Physical Exam  Vitals and nursing note reviewed.   Constitutional:       General: He is not in acute distress.     Appearance: Normal appearance. He is well-developed. He is not ill-appearing, toxic-appearing or diaphoretic.   HENT:      Head: Normocephalic and atraumatic.      Right Ear: Ear canal and external ear normal.      Left Ear: Ear canal and external ear normal.      Nose: Nose normal. No congestion or rhinorrhea.      Mouth/Throat:      Mouth: Mucous membranes are moist.      Pharynx: No oropharyngeal exudate.   Eyes:      General: No scleral icterus.        Right eye: No discharge.         Left eye: No discharge.      Extraocular Movements: Extraocular movements intact.      Conjunctiva/sclera: Conjunctivae normal.      Pupils: Pupils are equal, round, and reactive to light.   Neck:      Thyroid: No thyromegaly.      Vascular: No carotid bruit or JVD.      Trachea: No tracheal deviation.   Cardiovascular:      Rate and Rhythm: Normal rate and regular rhythm.      Pulses: Normal  pulses.      Heart sounds: Normal heart sounds. No murmur heard.   No friction rub. No gallop.    Pulmonary:      Effort: Pulmonary effort is normal. No respiratory distress.      Breath sounds: Normal breath sounds. No stridor. No wheezing, rhonchi or rales.   Chest:      Chest wall: No tenderness.   Abdominal:      General: Bowel sounds are normal. There is no distension.      Palpations: Abdomen is soft. There is no mass.      Tenderness: There is no abdominal tenderness. There is no guarding or rebound.      Hernia: No hernia is present.   Musculoskeletal:         General: No swelling, tenderness, deformity or signs of injury. Normal range of motion.      Cervical back: Normal range of motion and neck supple. No rigidity. No muscular tenderness.      Right lower leg: No edema.      Left lower leg: No edema.   Lymphadenopathy:      Cervical: No cervical adenopathy.   Skin:     General: Skin is warm and dry.      Coloration: Skin is not jaundiced or pale.      Findings: No bruising, erythema or rash.   Neurological:      General: No focal deficit present.      Mental Status: He is alert and oriented to person, place, and time. Mental status is at baseline.      Cranial Nerves: No cranial nerve deficit.      Sensory: No sensory deficit.      Motor: No weakness or abnormal muscle tone.      Coordination: Coordination normal.   Psychiatric:         Mood and Affect: Mood normal.         Behavior: Behavior normal.         Thought Content: Thought content normal.         Judgment: Judgment normal.            Pertinent  and/or Most Recent Results     LAB RESULTS:      Lab 08/09/21  0541 08/08/21  0535 08/07/21  0355 08/06/21  2148 08/06/21  1454 08/06/21  0741 08/06/21  0553 08/05/21  1830 08/05/21  1725 08/04/21  0502 08/03/21  1849 08/03/21  1430 08/03/21  1425 08/03/21  1425   WBC 10.00 12.50* 6.50  --   --   --  10.60  --  11.90*   < > 14.50*  --    < > 15.20*   HEMOGLOBIN 12.2* 12.2* 12.8*  --   --   --  13.1  --   12.9*   < > 13.8  --    < > 14.3   HEMATOCRIT 37.3* 36.9* 38.8  --   --   --  39.8  --  39.7   < > 41.7  --    < > 43.7   PLATELETS 256 227 199  --   --   --  173  --  194   < > 181  --    < > 211   NEUTROS ABS 6.90 10.30* 5.50  --   --   --  7.30*  --  8.10*   < > 11.20*  --    < > 11.50*   LYMPHS ABS 1.70 1.10 0.60*  --   --   --  2.00  --  2.30   < > 1.50  --    < > 2.00   MONOS ABS 1.20* 1.10* 0.40  --   --   --  1.20*  --  1.40*   < > 1.60*  --    < > 1.60*   EOS ABS 0.10 0.00 0.00  --   --   --  0.00  --  0.00   < > 0.00  --    < > 0.00   MCV 89.9 91.1 91.6  --   --   --  90.9  --  91.7   < > 89.2  --    < > 90.0   CRP  --   --   --   --   --   --   --   --   --   --   --   --   --  14.42*   PROCALCITONIN  --   --   --   --   --   --   --   --   --   --   --   --   --  0.36*   LACTATE  --   --   --   --   --   --   --   --   --   --   --  1.9  --   --    PROTIME  --   --   --   --   --   --   --   --   --   --  11.2  --   --   --    APTT  --   --  27.4* 29.6* 49.5* 52.6* 50.2*   < >  --    < > 28.8*  --   --   --     < > = values in this interval not displayed.         Lab 08/09/21  0541 08/08/21  0535 08/07/21  0355 08/06/21  0012 08/05/21  1725 08/05/21  0450 08/05/21  0450 08/04/21  0502 08/03/21  1849   SODIUM 136 130* 135* 131* 134*   < > 136   < >  --    POTASSIUM 4.6 5.7* 5.3* 4.9 4.9   < > 5.0   < >  --    CHLORIDE 100 99 101 99 101   < > 101   < >  --    CO2 25.0 23.0 21.0* 21.0* 23.0   < > 24.0   < >  --    ANION GAP 11.0 8.0 13.0 11.0 10.0   < > 11.0   < >  --    BUN 58* 59* 47* 39* 40*   < > 37*   < >  --    CREATININE 1.69* 1.86* 1.67* 1.79* 1.76*   < > 1.80*   < >  --    GLUCOSE 134* 276* 218* 190* 136*   < > 168*   < >  --    CALCIUM 8.6 8.7 8.6 8.4* 8.6   < > 8.3*   < >  --    MAGNESIUM 2.3 2.7* 2.7* 2.3  --   --  2.1   < >  --    PHOSPHORUS 4.1 3.8 4.8* 3.1  --   --  3.4   < >  --    HEMOGLOBIN A1C  --   --   --   --   --   --   --   --  7.5*    < > = values in this interval not displayed.          Lab 08/09/21  0541 08/08/21  0535 08/07/21  0355 08/06/21  0012 08/05/21  0450   TOTAL PROTEIN 6.4 6.4 6.6 6.5 6.8   ALBUMIN 3.30* 3.30* 3.20* 3.00* 3.60   GLOBULIN 3.1 3.1 3.4 3.5 3.2   ALT (SGPT) 274* 168* 175* 113* 105*   AST (SGOT) 152* 53* 90* 96* 151*   BILIRUBIN 0.4 0.3 0.3 0.5 0.6   ALK PHOS 95 93 99 105 100         Lab 08/09/21  1233 08/09/21  0541 08/08/21  2355 08/08/21  1817 08/08/21  1221 08/03/21  2302 08/03/21  1849 08/03/21  1425   PROBNP  --   --   --   --   --   --   --  13,350.0*   TROPONIN T 3.850* 4.440* 4.410* 3.400* 3.610*   < >  --  3.420*   PROTIME  --   --   --   --   --   --  11.2  --    INR  --   --   --   --   --   --  1.01  --     < > = values in this interval not displayed.         Lab 08/04/21  0502   CHOLESTEROL 130   LDL CHOL 56   HDL CHOL 51   TRIGLYCERIDES 133             Brief Urine Lab Results  (Last result in the past 365 days)      Color   Clarity   Blood   Leuk Est   Nitrite   Protein   CREAT   Urine HCG        08/03/21 1445 Yellow Clear Negative Negative Negative 30 mg/dL (1+)             Microbiology Results (last 10 days)     Procedure Component Value - Date/Time    MRSA Screen, PCR (Inpatient) - Swab, Nares [[id number]]  (Normal) Collected: 08/03/21 2004    Lab Status: Final result Specimen: Swab from Nares Updated: 08/03/21 2122     MRSA PCR No MRSA Detected    Blood Culture - Blood, Arm, Right [[id number]] Collected: 08/03/21 1444    Lab Status: Final result Specimen: Blood from Arm, Right Updated: 08/08/21 1500     Blood Culture No growth at 5 days    Blood Culture - Blood, Arm, Right [[id number]] Collected: 08/03/21 1425    Lab Status: Final result Specimen: Blood from Arm, Right Updated: 08/08/21 1430     Blood Culture No growth at 5 days    COVID-19,CEPHEID,COR/INDERJIT/PAD/CLEMENCIA IN-HOUSE(OR EMERGENT/ADD-ON),NP SWAB IN TRANSPORT MEDIA 3-4 HR TAT, RT-PCR - Swab, Nasopharynx [[id number]]  (Normal) Collected: 08/03/21 1353    Lab Status: Final result Specimen: Swab from  Nasopharynx Updated: 08/03/21 1419     COVID19 Not Detected    Narrative:      Fact sheet for providers: https://www.fda.gov/media/388748/download     Fact sheet for patients: https://www.fda.gov/media/110193/download  Fact sheet for providers: https://www.fda.gov/media/481657/download    Fact sheet for patients: https://www.fda.gov/media/792221/download    Test performed by PCR.    Respiratory Panel PCR w/COVID-19(SARS-CoV-2) FARIDA/NEELAM/INDERJIT/PAD/COR/MAD/DAVONTE In-House, NP Swab in UTM/VTM, 3-4 HR TAT - Swab, Nasopharynx [[id number]]  (Normal) Collected: 08/03/21 1353    Lab Status: Final result Specimen: Swab from Nasopharynx Updated: 08/03/21 1545     ADENOVIRUS, PCR Not Detected     Coronavirus 229E Not Detected     Coronavirus HKU1 Not Detected     Coronavirus NL63 Not Detected     Coronavirus OC43 Not Detected     COVID19 Not Detected     Human Metapneumovirus Not Detected     Human Rhinovirus/Enterovirus Not Detected     Influenza A PCR Not Detected     Influenza B PCR Not Detected     Parainfluenza Virus 1 Not Detected     Parainfluenza Virus 2 Not Detected     Parainfluenza Virus 3 Not Detected     Parainfluenza Virus 4 Not Detected     RSV, PCR Not Detected     Bordetella pertussis pcr Not Detected     Bordetella parapertussis PCR Not Detected     Chlamydophila pneumoniae PCR Not Detected     Mycoplasma pneumo by PCR Not Detected    Narrative:      In the setting of a positive respiratory panel with a viral infection PLUS a negative procalcitonin without other underlying concern for bacterial infection, consider observing off antibiotics or discontinuation of antibiotics and continue supportive care. If the respiratory panel is positive for atypical bacterial infection (Bordetella pertussis, Chlamydophila pneumoniae, or Mycoplasma pneumoniae), consider antibiotic de-escalation to target atypical bacterial infection.          XR Chest 1 View    Result Date: 8/5/2021  Impression: Mild cardiomegaly with no acute  findings otherwise seen. Electronically signed by:  Warren Luna D.O.  8/4/2021 10:04 PM    XR Chest 1 View    Result Date: 8/3/2021  Impression: No active disease.  Electronically Signed By-Antony Muro MD On:8/3/2021 2:43 PM This report was finalized on [id number] by  Antony Muro MD.    US Renal Bilateral    Result Date: 8/5/2021  Impression: Normal renal ultrasound. Electronically signed by:  Timbo Miguel M.D.  8/5/2021 3:26 AM              Results for orders placed during the hospital encounter of 08/03/21    Adult Transthoracic Echo Complete w/ Color, Spectral and Contrast if Necessary Per Protocol    Interpretation Summary  · Left ventricular ejection fraction appears to be less than 20%.  · The right ventricular cavity is mildly dilated.  · Severe mitral valve regurgitation is present.  · Moderate tricuspid valve regurgitation is present.  · No pericardial effusion noted      Labs Pending at Discharge:      Procedures Performed  Procedure(s):  Left Heart Cath and coronary angiogram  Saphenous Vein Graft         Consults:   Consults     Date and Time Order Name Status Description    8/8/2021 12:46 PM Inpatient Nephrology Consult      8/5/2021  6:43 PM Inpatient Hospitalist Consult      8/5/2021  9:43 AM Inpatient Nephrology Consult      8/4/2021 12:35 AM Inpatient Nephrology Consult Completed     8/3/2021 11:03 PM Inpatient Cardiology Consult Completed             Discharge Details        Discharge Medications      New Medications      Instructions Start Date   Alcohol Wipes 70 % misc   1 each, Apply externally, Daily      atorvastatin 40 MG tablet  Commonly known as: LIPITOR   40 mg, Oral, Nightly      bumetanide 1 MG tablet  Commonly known as: BUMEX   1 mg, Oral, 2 Times Daily      insulin glargine 100 UNIT/ML injection  Commonly known as: LANTUS, SEMGLEE   10 Units, Subcutaneous, Every 12 Hours Scheduled      OneTouch Delica Plus [id number] misc   1 each, Does not apply, Daily      OneTouch Verio test  strip  Generic drug: glucose blood   1 each, Other, Daily, Use as instructed         Changes to Medications      Instructions Start Date   carvedilol 12.5 MG tablet  Commonly known as: COREG  What changed:   medication strength  how much to take   12.5 mg, Oral, 2 Times Daily         Continue These Medications      Instructions Start Date   aspirin 325 MG tablet   325 mg, Oral, Every Morning         Stop These Medications    acetaminophen 325 MG tablet  Commonly known as: TYLENOL     Entresto 49-51 MG tablet  Generic drug: sacubitril-valsartan     Farxiga 5 MG tablet tablet  Generic drug: dapagliflozin     glimepiride 4 MG tablet  Commonly known as: AMARYL     metFORMIN 1000 MG tablet  Commonly known as: GLUCOPHAGE     naproxen sodium 220 MG tablet  Commonly known as: ALEVE     simvastatin 40 MG tablet  Commonly known as: ZOCOR            Allergies   Allergen Reactions   • Shellfish Allergy Unknown (See Comments)         Discharge Disposition:  Short Term Hospital (DC - External)    Diet:  Hospital:  Diet Order   Procedures   • Diet Cardiac, Renal; Healthy Heart; 2gm K+         Discharge Activity:         CODE STATUS:  Code Status and Medical Interventions:   Ordered at: 08/03/21 1913     Code Status:    CPR     Medical Interventions (Level of Support Prior to Arrest):    Full         Future Appointments   Date Time Provider Department Center   11/5/2021  2:00 PM [facility]           Time spent on Discharge including face to face service:  33 minutes    This patient has been examined wearing appropriate Personal Protective Equipment and discussed with hospital infection control department. 08/09/21      Signature:

## 2021-08-09 NOTE — PROGRESS NOTES
[facility] Progress Note      Patient Name: Yogi Tucker  : 1980  MRN: [id number]  Primary Care Physician:  Mey Cox  Date of admission: 8/3/2021    Subjective     Interval History:     Patient resting comfortably.  Complaining of orthopnea but no chest pain, shortness with minimal exertion, nausea or vomiting    Review of Systems:   As noted above    Objective     Vitals:   Temp:  [97.2 °F (36.2 °C)-98.2 °F (36.8 °C)] 98.1 °F (36.7 °C)  Heart Rate:  [] 100  Resp:  [16-18] 16  BP: ()/() 111/75  Flow (L/min):  [2] 2    Intake/Output Summary (Last 24 hours) at 2021 0809  Last data filed at 2021 0555  Gross per 24 hour   Intake 840 ml   Output 1725 ml   Net -885 ml       Physical Exam:    General Appearance: alert, oriented x 3, no acute distress   Skin: warm and dry  HEENT: oral mucosa normal, nonicteric sclera  Neck: supple, no JVD  Lungs: CTA  Heart: RRR, soft systolic murmur grade 3  Abdomen: soft, nontender, nondistended  : no palpable bladder  Extremities: +  edema, cyanosis or clubbing  Neuro: normal speech and mental status     Scheduled Meds:     aspirin, 81 mg, Oral, Daily  atorvastatin, 40 mg, Oral, Nightly  carvedilol, 6.25 mg, Oral, BID  heparin (porcine), 5,000 Units, Subcutaneous, Q8H  insulin glargine, 10 Units, Subcutaneous, Q12H  insulin lispro, 0-14 Units, Subcutaneous, 4x Daily With Meals & Nightly  midodrine, 10 mg, Oral, TID AC      IV Meds:        Results Reviewed:   I have personally reviewed the results from the time of this admission to 2021 08:09 EDT     Results from last 7 days   Lab Units 21  0541 21  0535 21  0355   SODIUM mmol/L 136 130* 135*   POTASSIUM mmol/L 4.6 5.7* 5.3*   CHLORIDE mmol/L 100 99 101   CO2 mmol/L 25.0 23.0 21.0*   BUN mg/dL 58* 59* 47*   CREATININE mg/dL 1.69* 1.86* 1.67*   CALCIUM mg/dL 8.6 8.7 8.6   BILIRUBIN mg/dL 0.4 0.3 0.3   ALK PHOS U/L 95 93 99   ALT (SGPT) U/L  274* 168* 175*   AST (SGOT) U/L 152* 53* 90*   GLUCOSE mg/dL 134* 276* 218*       Estimated Creatinine Clearance: 74.7 mL/min (A) (by C-G formula based on SCr of 1.69 mg/dL (H)).    Results from last 7 days   Lab Units 08/09/21  0541 08/08/21  0535 08/07/21  0355   MAGNESIUM mg/dL 2.3 2.7* 2.7*   PHOSPHORUS mg/dL 4.1 3.8 4.8*       Results from last 7 days   Lab Units 08/05/21  0450   URIC ACID mg/dL 8.8*       Results from last 7 days   Lab Units 08/09/21  0541 08/08/21  0535 08/07/21  0355 08/06/21  0553 08/05/21  1725   WBC 10*3/mm3 10.00 12.50* 6.50 10.60 11.90*   HEMOGLOBIN g/dL 12.2* 12.2* 12.8* 13.1 12.9*   PLATELETS 10*3/mm3 256 227 199 173 194       Results from last 7 days   Lab Units 08/03/21  1849   INR  1.01       Assessment / Plan       ASSESSMENT:    1.  Chronic kidney disease stage III  Secondary to cardiorenal syndrome or underlying hypertension and diabetes  His creatinine fluctuates with diuretics  Electrolytes okay but volume status generous    2.Non-STEMI with dilated cardiomyopathy with coronary disease status post cardioverter defibrillator admitted with cardiogenic shock.  With severe MR patient underwent cardiac cath none amendable for percutaneous intervention.      3.  Congestive heart failure/ischemic cardiomyopathy  Along with MR and TR  On oral midodrine for hypotension    4.  Hyperkalemia  Improved with medical management    PLAN:  Start oral Bumex  Patient awaiting transfer, for further evaluation by transplant team  Low K diet  Follow renal function closely  Avoid nephrotoxins      Thank you for involving us in the care of Yogi CURRY Tucker.  Please feel free to call with any questions.    Eleazar Briggs MD  08/09/21  08:09 EDT    [facility]  [phone]      Much of this encounter note is an electronic transcription/translation of spoken language to printed text. The electronic translation of spoken language may permit erroneous, or at times, nonsensical words or  phrases to be inadvertently transcribed; Although I have reviewed the note for such errors, some may still exist.

## 2021-08-09 NOTE — CASE MANAGEMENT/SOCIAL WORK
Case Management Discharge Note      Final Note: UL/ [employer]ian[employer]         Selected Continued Care - Discharged on 8/9/2021 Admission date: 8/3/2021 - Discharge disposition: Short Term Hospital (DC - External)                     Final Discharge Disposition Code: 02 - short term hospital for  care

## 2021-08-09 NOTE — PROGRESS NOTES
"PULMONARY CRITICAL CARE Progress  NOTE      PATIENT IDENTIFICATION:  Name: Yogi Tucker  MRN: [id number]  :  1980     Age: 40 y.o.  Sex: male    DATE OF Note:  2021   Referring Physician: Seven Mcneil MD                  Subjective:   No shortness of air on 2L O2  Good urinary output with diuresis  Cardiology increased carvedilol  Patient denies chest pain  Nausea has resolved, tolerating p.o. intake    Awaiting transfer to [facility] for transplant [id number] LVAD  Reaching out to [facility], [facility]  Patient is very frustrated with lack of progress and inability to move to another facility    Objective:  tMax 24 hrs: Temp (24hrs), Av.8 °F (36.6 °C), Min:97.2 °F (36.2 °C), Max:98.2 °F (36.8 °C)      Vitals Ranges:   Temp:  [97.2 °F (36.2 °C)-98.2 °F (36.8 °C)] 97.4 °F (36.3 °C)  Heart Rate:  [] 100  Resp:  [16-18] 16  BP: ()/() 111/75    Intake and Output Last 3 Shifts:   I/O last 3 completed shifts:  In: 1115 [P.O.:1115]  Out: 2925 [Urine:2925]    Exam:  /75   Pulse 100   Temp 97.4 °F (36.3 °C) (Oral)   Resp 16   Ht 182.9 cm (72\")   Wt 113 kg (248 lb 10.9 oz)   SpO2 98%   BMI 33.73 kg/m²     Constitutional:  Well developed, well nourished, no acute distress, non-toxic appearance   Eyes:  PERRL, conjunctiva normal, EOMI   HENT:  Atraumatic, external ears normal, nose normal. Neck-normal range of motion, no tenderness  Respiratory: Diminished bases, no rhonchi or wheezing, non-labored respirations without accessory muscle use  Cardiovascular: Sinus tachycardia, + systolic murmur, no gallops, no rubs   GI:  Soft, nondistended, normal bowel sounds, nontender, no rebound or guarding   : Deferred  Musculoskeletal: Pitting BLE edema, no clubbing, no deformities  Integument:  Well hydrated, no rash   Neurologic:  Alert & oriented x 3, no lateralizing deficits  Psychiatric:  Speech and behavior appropriate         " Medications:    Current Facility-Administered Medications:   •  acetaminophen (TYLENOL) tablet 650 mg, 650 mg, Oral, Q4H PRN **OR** acetaminophen (TYLENOL) suppository 650 mg, 650 mg, Rectal, Q4H PRN, Carl Appiah MD  •  aluminum-magnesium hydroxide-simethicone (MAALOX MAX) 400-400-40 MG/5ML suspension 15 mL, 15 mL, Oral, Q6H PRN, Carl Appiah MD  •  aspirin EC tablet 81 mg, 81 mg, Oral, Daily, Carl Appiah MD, 81 mg at 08/09/21 0838  •  atorvastatin (LIPITOR) tablet 40 mg, 40 mg, Oral, Nightly, Carl Apipah MD, 40 mg at 08/08/21 2053  •  atropine sulfate injection 0.5 mg, 0.5 mg, Intravenous, Q5 Min PRN, Carl Appiah MD  •  bumetanide (BUMEX) tablet 1 mg, 1 mg, Oral, BID, Eleazar Briggs MD, 1 mg at 08/09/21 0837  •  carvedilol (COREG) tablet 6.25 mg, 6.25 mg, Oral, BID, Yoseph Tripathi MD, 6.25 mg at 08/09/21 0838  •  dextrose (D50W) 25 g/ 50mL Intravenous Solution 25 g, 25 g, Intravenous, Q15 Min PRN, Carl Appiah MD  •  dextrose (GLUTOSE) oral gel 15 g, 15 g, Oral, Q15 Min PRN, Carl Appiah MD  •  glucagon (human recombinant) (GLUCAGEN DIAGNOSTIC) injection 1 mg, 1 mg, Subcutaneous, Q15 Min PRN, Carl Appiah MD  •  heparin (porcine) 5000 UNIT/ML injection 5,000 Units, 5,000 Units, Subcutaneous, Q8H, Day, Tatiana, APRN, 5,000 Units at 08/09/21 0553  •  insulin glargine (LANTUS, SEMGLEE) injection 10 Units, 10 Units, Subcutaneous, Q12H, Day, Tatiana, APRN, 10 Units at 08/09/21 0838  •  insulin lispro (ADMELOG) injection 0-14 Units, 0-14 Units, Subcutaneous, 4x Daily With Meals & Nightly, 5 Units at 08/08/21 2104 **AND** [DISCONTINUED] insulin lispro (ADMELOG) injection 0-14 Units, 0-14 Units, Subcutaneous, 4x Daily With Meals & Nightly, Day, WHIT Simpson  •  midodrine (PROAMATINE) tablet 10 mg, 10 mg, Oral, TID AC, Suleiman Wren MD, 10 mg at 08/09/21 0837  •  nitroglycerin (NITROSTAT) SL tablet 0.4 mg, 0.4 mg, Sublingual, Q5 Min PRN, Carl Appiah MD  •  ondansetron (ZOFRAN) tablet 4 mg, 4 mg,  Oral, Q6H PRN **OR** ondansetron (ZOFRAN) injection 4 mg, 4 mg, Intravenous, Q6H PRN, Carl Appiah MD, 4 mg at 08/06/21 0842  •  sodium chloride 0.9 % infusion 250 mL, 250 mL, Intravenous, Once PRN, Carl Appiah MD    Data Review:  All labs (24hrs):   Recent Results (from the past 24 hour(s))   POC Glucose Once    Collection Time: 08/08/21 11:18 AM    Specimen: Blood   Result Value Ref Range    Glucose 298 (H) 70 - 105 mg/dL   Troponin    Collection Time: 08/08/21 12:21 PM    Specimen: Blood   Result Value Ref Range    Troponin T 3.610 (C) 0.000 - 0.030 ng/mL   POC Glucose Once    Collection Time: 08/08/21  6:13 PM    Specimen: Blood   Result Value Ref Range    Glucose 209 (H) 70 - 105 mg/dL   Troponin    Collection Time: 08/08/21  6:17 PM    Specimen: Blood   Result Value Ref Range    Troponin T 3.400 (C) 0.000 - 0.030 ng/mL   Troponin    Collection Time: 08/08/21 11:55 PM    Specimen: Blood   Result Value Ref Range    Troponin T 4.410 (C) 0.000 - 0.030 ng/mL   Troponin    Collection Time: 08/09/21  5:41 AM    Specimen: Blood   Result Value Ref Range    Troponin T 4.440 (C) 0.000 - 0.030 ng/mL   Magnesium    Collection Time: 08/09/21  5:41 AM    Specimen: Blood   Result Value Ref Range    Magnesium 2.3 1.6 - 2.6 mg/dL   Phosphorus    Collection Time: 08/09/21  5:41 AM    Specimen: Blood   Result Value Ref Range    Phosphorus 4.1 2.5 - 4.5 mg/dL   Comprehensive Metabolic Panel    Collection Time: 08/09/21  5:41 AM    Specimen: Blood   Result Value Ref Range    Glucose 134 (H) 65 - 99 mg/dL    BUN 58 (H) 6 - 20 mg/dL    Creatinine 1.69 (H) 0.76 - 1.27 mg/dL    Sodium 136 136 - 145 mmol/L    Potassium 4.6 3.5 - 5.2 mmol/L    Chloride 100 98 - 107 mmol/L    CO2 25.0 22.0 - 29.0 mmol/L    Calcium 8.6 8.6 - 10.5 mg/dL    Total Protein 6.4 6.0 - 8.5 g/dL    Albumin 3.30 (L) 3.50 - 5.20 g/dL    ALT (SGPT) 274 (H) 1 - 41 U/L    AST (SGOT) 152 (H) 1 - 40 U/L    Alkaline Phosphatase 95 39 - 117 U/L    Total Bilirubin 0.4  0.0 - 1.2 mg/dL    eGFR Non African Amer 45 (L) >60 mL/min/1.73    Globulin 3.1 gm/dL    A/G Ratio 1.1 g/dL    BUN/Creatinine Ratio 34.3 (H) 7.0 - 25.0    Anion Gap 11.0 5.0 - 15.0 mmol/L   CBC Auto Differential    Collection Time: 21  5:41 AM    Specimen: Blood   Result Value Ref Range    WBC 10.00 3.40 - 10.80 10*3/mm3    RBC 4.15 4.14 - 5.80 10*6/mm3    Hemoglobin 12.2 (L) 13.0 - 17.7 g/dL    Hematocrit 37.3 (L) 37.5 - 51.0 %    MCV 89.9 79.0 - 97.0 fL    MCH 29.3 26.6 - 33.0 pg    MCHC 32.6 31.5 - 35.7 g/dL    RDW 14.2 12.3 - 15.4 %    RDW-SD 44.6 37.0 - 54.0 fl    MPV 9.7 6.0 - 12.0 fL    Platelets 256 140 - 450 10*3/mm3    Neutrophil % 69.6 42.7 - 76.0 %    Lymphocyte % 17.5 (L) 19.6 - 45.3 %    Monocyte % 11.9 5.0 - 12.0 %    Eosinophil % 0.6 0.3 - 6.2 %    Basophil % 0.4 0.0 - 1.5 %    Neutrophils, Absolute 6.90 1.70 - 7.00 10*3/mm3    Lymphocytes, Absolute 1.70 0.70 - 3.10 10*3/mm3    Monocytes, Absolute 1.20 (H) 0.10 - 0.90 10*3/mm3    Eosinophils, Absolute 0.10 0.00 - 0.40 10*3/mm3    Basophils, Absolute 0.00 0.00 - 0.20 10*3/mm3    nRBC 0.2 0.0 - 0.2 /100 WBC   POC Glucose Once    Collection Time: 21  8:07 AM    Specimen: Blood   Result Value Ref Range    Glucose 123 (H) 70 - 105 mg/dL        Imaging:  Cardiac Catheterization/Vascular Study  Heart Cath Report    NAME:              Yogi Tucker  :                1980  AGE/SEX:        40 y.o. male  MRN:                [id number]  ADM DATE:      [unfilled]  DOS:                     Pre-Procedure Notes  H&P Performed  [x]  Yes []  No       []  N/A    Indications:   []  ACS <= 24 HRS   [x]  ACS >24 HRS   [x]  New Onset Angina <= 2 mos   []  Worsening Angina   []  Resuscitated Cardiac Arrest   []  Angina on Exertion:   []  Suspected CAD   []  Valvular Disease   []  Pericardial Disease   []  Cardiac Arrythmia   [x]  Cardiomyopathy   [x]  LV Dysfunction   []  Syncope   []  Post Cardiac Transplant   []  Eval. For Exercise Clearance   []   Abnormal Stress Test    []  Other   []  Pre-Operative Evaluation        If Pre-Op Eval:   Evaluation for Surgery Type:  []  Cardiac Surgery   []  Non-Cardiac   Surgery   Functional Capacity:  []  <4 METS  []  >=4 METS w/o symptoms          []  >= 4 METS with symptoms  []  Unknown   Surgical Risk:  []  Low  []  Intermediate         []  High Risk: Vascular  []  High Risk Non-Vascular    Risks, Benefits, & Complications Discussed:  [x]  Yes  []  No  []  N/A    Questions Answered:  [x]  Yes  []  No  []  N/A    Consent Obtained:  [x]  Yes  []  No  []  N/A    CHF: [x]  Yes  []  No     If Yes:  Newly Diagnosed?  []  Yes  [x]  No   If Yes:  HF Type:  []  Diastolic  [x]  Systolic  []  Unknown     Procedure Description  The patient underwent successful [x]  Left  []  Right  []  Left & Right    Heart catheterization and coronary angiography via the   [x]  Femoral approach  []  Radial approach  []  Brachial approach    Procedure Narrative:   Informed consent was obtained from the patient after explaining risks and   benefits.  Patient was brought to the cardiac catheterization laboratory   and placed on the table in the usual fashion.  Right groin was shaved and   prepped in sterile fashion. Moderate conscious sedation was given   utilizing IV Versed and fentanyl administered by RN with continuous EKG   oximetry and hemodynamic monitoring supervised by me throughout the entire   case, conscious sedation time was 30 minutes.  I was present with the   patient for the duration of moderate sedation and supervised staff who had   no other duties and monitored the patient for the entire procedure patient   had Proctor 2-3 sedation scale. 2% lidocaine was used for local anesthesia   to the right groin.  A total of 20 cc was used.  A 6 Dutch sheath was   placed in the right femoral artery.  A  6 Dutch JR4 catheter and a 6   Dutch JL4 catheter was used for the procedure.  After the cardiac   catheterization is complete, all the  catheters and sheath were removed.    Patient tolerated the procedure very well.  No complications noted.    Hemodynamic    LVEDP:   Estimated EF %:      Initial Aortic Pressure: 90/70    AV Gradient:      Rt. Heart Pressure:    Wall Motion:      Dominance:  []  Left  [x]  Right  []  Co-Dominant      Coronary Arteriography: (Please Code highest degree of stenosis)    Left Main %:   70%  Proximal LAD %: 100%  Mid/Distal LAD %: 100%  LCX %: 90% proximal.  OM 2 is 100% occluded  Ramus:    RCA %: 100%  Lima %: LIMA to the LAD is patent and the distal LAD has 80 to 90% disease  SVG(s) %: SVG to the RCA is occluded  SVG to the marginal branch is occluded    Complications: No complications    Estimated Blood Loss:  None    Impression and Recommendation: Severe three-vessel coronary disease  Status post coronary bypass surgery with 1 out of 3 grafts patent but the   LAD itself also has severe disease with collaterals to the circumflex   artery  Severe LV dysfunction  Aggressive treatment for heart failure and possible transfer to a facility   with LVAD and transplant program  Discussed with patient and family.    Electronically signed by Carl Appiah MD, 08/06/21, 6:52 PM EDT       ASSESSMENT:    NSTEMI (non-ST elevated myocardial infarction) (CMS/HCC)    Cardiomyopathy, ischemic    Coronary artery disease    Heart failure (CMS/HCC)  DEEPA    Hyperlipidemia    Hypertensive disorder    Presence of automatic implantable cardioverter-defibrillator    Cardiogenic shock (CMS/HCC)    Severe sepsis, not felt to be septic shock    Type 2 diabetes mellitus with hyperglycemia (CMS/HCC)    SATHISH (acute kidney injury) (CMS/[facility])    H/O noncompliance with medical treatment, presenting hazards to health     PLAN:  Awaiting transfer to a cardiac transplant center  We will try other hospitals with transplant facility however bed availability is difficult at this time  O2 support wean down keep sat more than 88%  Consider BIPAP while  sleeping  Bronchodilator  Inhaled corticosteroids  Electrolytes  Glycemic control with sliding scale insulin and Lantus  DVT prophylaxis Heparin  GI prophylaxis not indicated, tolerating p.o. intake    Nephrology following  Cardiology following  Hospitalist following for primary medical management

## 2021-08-10 NOTE — PROGRESS NOTES
CC--- severe multivessel coronary artery disease  Severe LV dysfunction and severe MR and TR      Sub--post cardiac catheterization with severe multivessel disease being transferred to ICU for transplant evaluation  Denies any angina or dyspnea        Past Medical History:   Diagnosis Date   • Coronary artery disease    • Hyperlipidemia    • Hypertension      Past Surgical History:   Procedure Laterality Date   • CARDIAC CATHETERIZATION N/A 8/6/2021    Procedure: Left Heart Cath and coronary angiogram;  Surgeon: Carl Appiah MD;  Location: [facility] CATH INVASIVE LOCATION;  Service: Cardiovascular;  Laterality: N/A;   • CARDIAC CATHETERIZATION  8/6/2021    Procedure: Saphenous Vein Graft;  Surgeon: Carl Appiah MD;  Location: [facility] CATH INVASIVE LOCATION;  Service: Cardiovascular;;   • CORONARY ARTERY BYPASS GRAFT     • LEFT HEART CATH       Family History   Problem Relation Age of Onset   • No Known Problems Mother    • No Known Problems Father      Social History     Tobacco Use   • Smoking status: Never Smoker   • Smokeless tobacco: Former User     Types: Chew   Substance Use Topics   • Alcohol use: Not on file   • Drug use: Not on file     Review of Systems   General:  positive for fatigue and tiredness  Eyes: No redness  Cardiovascular: No chest pain, no palpitations  Respiratory:   positive for class 3 shortness of breath  Gastrointestinal: No nausea or vomiting, bleeding  Genitourinary: no hematuria or dysuria  Musculoskeletal: No arthralgia or myalgia  Skin: No rash  Neurologic: No numbness, tingling, syncope  Hematologic/Lymphatic: No abnormal bleeding      Physical Exam  VITALS REVIEWED--blood pressure 104/72 pulse rate is 82 patient afebrile respiration 12 times a minute    General:      well developed, well nourished, in no acute distress.    Head:      normocephalic and atraumatic.    Eyes:      PERRL/EOM intact, conjunctiva and sclera clear with out nystagmus.    Neck:      no masses, thyromegaly,   trachea central with normal respiratory effort and PMI displaced laterally  Lungs:      clear bilaterally to auscultation.    Heart:       Sinus rhythm with a gallop without any rubs --frequent PVCs noted  Msk:      no deformity or scoliosis noted of thoracic or lumbar spine.    Pulses:      pulses normal in all 4 extremities.    Extremities:       no cyanosis or clubbing--1+ bilateral edema noted     Neurologic:      no focal deficits.   alert oriented x3  Skin:      intact without lesions or rashes.    Psych:      alert and cooperative; normal mood and affect; normal attention span and concentration.          CBC    Results from last 7 days   Lab Units 08/09/21  0541 08/08/21  0535 08/07/21  0355 08/06/21  0553 08/05/21  1725 08/05/21  0450 08/04/21  0502   WBC 10*3/mm3 10.00 12.50* 6.50 10.60 11.90* 12.30* 11.60*   HEMOGLOBIN g/dL 12.2* 12.2* 12.8* 13.1 12.9* 13.4 13.2   PLATELETS 10*3/mm3 256 227 199 173 194 171 154     BMP   Results from last 7 days   Lab Units 08/09/21  0541 08/08/21  0535 08/07/21  0355 08/06/21  0012 08/05/21  1725 08/05/21  0450 08/04/21  1225 08/04/21  0502 08/04/21  0502 08/03/21  1425 08/03/21  1425   SODIUM mmol/L 136 130* 135* 131* 134* 136 137   < > 137   < > 135*   POTASSIUM mmol/L 4.6 5.7* 5.3* 4.9 4.9 5.0 5.0   < > 4.4   < > 4.8   CHLORIDE mmol/L 100 99 101 99 101 101 101   < > 101   < > 98   CO2 mmol/L 25.0 23.0 21.0* 21.0* 23.0 24.0 23.0   < > 25.0   < > 26.0   BUN mg/dL 58* 59* 47* 39* 40* 37* 31*   < > 29*   < > 25*   CREATININE mg/dL 1.69* 1.86* 1.67* 1.79* 1.76* 1.80* 1.79*   < > 1.72*   < > 1.89*   GLUCOSE mg/dL 134* 276* 218* 190* 136* 168* 137*   < > 152*   < > 196*   MAGNESIUM mg/dL 2.3 2.7* 2.7* 2.3  --  2.1  --   --  2.0  --  2.0   PHOSPHORUS mg/dL 4.1 3.8 4.8* 3.1  --  3.4  --   --  2.8  --  3.5    < > = values in this interval not displayed.     Infection   Results from last 7 days   Lab Units 08/03/21  1444 08/03/21  1425   BLOODCX  No growth at 5 days No growth at 5  days   PROCALCITONIN ng/mL  --  0.36*     CMP   Results from last 7 days   Lab Units 08/09/21  0541 08/08/21  0535 08/07/21  0355 08/06/21  0012 08/05/21  1725 08/05/21  0450 08/04/21  1225 08/04/21  0502 08/04/21  0502 08/03/21  1425 08/03/21  1425   SODIUM mmol/L 136 130* 135* 131* 134* 136 137   < > 137   < > 135*   POTASSIUM mmol/L 4.6 5.7* 5.3* 4.9 4.9 5.0 5.0   < > 4.4   < > 4.8   CHLORIDE mmol/L 100 99 101 99 101 101 101   < > 101   < > 98   CO2 mmol/L 25.0 23.0 21.0* 21.0* 23.0 24.0 23.0   < > 25.0   < > 26.0   BUN mg/dL 58* 59* 47* 39* 40* 37* 31*   < > 29*   < > 25*   CREATININE mg/dL 1.69* 1.86* 1.67* 1.79* 1.76* 1.80* 1.79*   < > 1.72*   < > 1.89*   GLUCOSE mg/dL 134* 276* 218* 190* 136* 168* 137*   < > 152*   < > 196*   ALBUMIN g/dL 3.30* 3.30* 3.20* 3.00*  --  3.60  --   --  3.60  --  3.80   BILIRUBIN mg/dL 0.4 0.3 0.3 0.5  --  0.6  --   --  0.7  --  0.6   ALK PHOS U/L 95 93 99 105  --  100  --   --  96  --  95   AST (SGOT) U/L 152* 53* 90* 96*  --  151*  --   --  110*  --  126*   ALT (SGPT) U/L 274* 168* 175* 113*  --  105*  --   --  46*  --  44*    < > = values in this interval not displayed.         A/p      Severe multivessel coronary artery disease with no further targets  Severe left dysfunction  Severe MR  ICD in situ  Class III systolic heart failure chronic  Cardiorenal syndrome  Patient awaiting transfer to  for transplant evaluation--no beds available at  and after discussing with patient and family and Dr. Appiah--will contact [facility] transplant services  Subcu heparin for DVT prophylaxis  Continue supportive management  Clinically stable without angina  Stop IV fluids  Nephrology consult requested  Restart Coreg at lower dose  Entresto for now because of hyperkalemia      Electronically signed by Carl Appiah MD, 08/09/21, 11:24 PM EDT.

## 2021-09-07 RX ORDER — SIMVASTATIN 40 MG
TABLET ORAL
Qty: 90 TABLET | Refills: 2 | OUTPATIENT
Start: 2021-09-07

## 2021-09-07 RX ORDER — SACUBITRIL AND VALSARTAN 49; 51 MG/1; MG/1
TABLET, FILM COATED ORAL
Qty: 180 TABLET | Refills: 2 | OUTPATIENT
Start: 2021-09-07

## 2021-09-07 RX ORDER — CARVEDILOL 25 MG/1
TABLET ORAL
Qty: 180 TABLET | Refills: 2 | OUTPATIENT
Start: 2021-09-07

## 2022-02-01 ENCOUNTER — OFFICE (OUTPATIENT)
Dept: URBAN - METROPOLITAN AREA CLINIC 64 | Facility: CLINIC | Age: 42
End: 2022-02-01

## 2022-02-01 VITALS
DIASTOLIC BLOOD PRESSURE: 68 MMHG | WEIGHT: 193 LBS | SYSTOLIC BLOOD PRESSURE: 101 MMHG | HEART RATE: 94 BPM | HEIGHT: 71 IN

## 2022-02-01 DIAGNOSIS — K92.1 MELENA: ICD-10-CM

## 2022-02-01 DIAGNOSIS — R19.7 DIARRHEA, UNSPECIFIED: ICD-10-CM

## 2022-02-01 PROCEDURE — 99203 OFFICE O/P NEW LOW 30 MIN: CPT | Performed by: INTERNAL MEDICINE

## 2022-02-02 ENCOUNTER — OFFICE (OUTPATIENT)
Dept: URBAN - METROPOLITAN AREA LAB 2 | Facility: LAB | Age: 42
End: 2022-02-02
Payer: COMMERCIAL

## 2022-02-02 DIAGNOSIS — A08.11 ACUTE GASTROENTEROPATHY DUE TO NORWALK AGENT: ICD-10-CM

## 2022-02-02 PROCEDURE — 87507 IADNA-DNA/RNA PROBE TQ 12-25: CPT | Performed by: INTERNAL MEDICINE

## 2022-03-23 ENCOUNTER — ON CAMPUS - OUTPATIENT (OUTPATIENT)
Dept: URBAN - METROPOLITAN AREA HOSPITAL 77 | Facility: HOSPITAL | Age: 42
End: 2022-03-23
Payer: COMMERCIAL

## 2022-03-23 DIAGNOSIS — Z94.1 HEART TRANSPLANT STATUS: ICD-10-CM

## 2022-03-23 DIAGNOSIS — K29.60 OTHER GASTRITIS WITHOUT BLEEDING: ICD-10-CM

## 2022-03-23 DIAGNOSIS — K92.1 MELENA: ICD-10-CM

## 2022-03-23 DIAGNOSIS — K63.3 ULCER OF INTESTINE: ICD-10-CM

## 2022-03-23 DIAGNOSIS — R19.7 DIARRHEA, UNSPECIFIED: ICD-10-CM

## 2022-03-23 DIAGNOSIS — K52.89 OTHER SPECIFIED NONINFECTIVE GASTROENTERITIS AND COLITIS: ICD-10-CM

## 2022-03-23 PROCEDURE — 44361 SMALL BOWEL ENDOSCOPY/BIOPSY: CPT | Performed by: INTERNAL MEDICINE

## 2022-03-23 PROCEDURE — 45380 COLONOSCOPY AND BIOPSY: CPT | Performed by: INTERNAL MEDICINE

## 2022-04-05 ENCOUNTER — OFFICE VISIT (OUTPATIENT)
Dept: CARDIOLOGY | Facility: CLINIC | Age: 42
End: 2022-04-05

## 2022-04-05 VITALS
HEART RATE: 92 BPM | OXYGEN SATURATION: 99 % | BODY MASS INDEX: 26.68 KG/M2 | DIASTOLIC BLOOD PRESSURE: 104 MMHG | HEIGHT: 72 IN | SYSTOLIC BLOOD PRESSURE: 153 MMHG | WEIGHT: 197 LBS

## 2022-04-05 DIAGNOSIS — I25.5 CARDIOMYOPATHY, ISCHEMIC: Primary | ICD-10-CM

## 2022-04-05 DIAGNOSIS — E11.9 TYPE 2 DIABETES MELLITUS WITHOUT COMPLICATION, WITHOUT LONG-TERM CURRENT USE OF INSULIN: ICD-10-CM

## 2022-04-05 DIAGNOSIS — Z95.810 PRESENCE OF AUTOMATIC IMPLANTABLE CARDIOVERTER-DEFIBRILLATOR: ICD-10-CM

## 2022-04-05 DIAGNOSIS — I25.10 CORONARY ARTERY DISEASE INVOLVING NATIVE CORONARY ARTERY OF NATIVE HEART WITHOUT ANGINA PECTORIS: ICD-10-CM

## 2022-04-05 DIAGNOSIS — E78.00 PURE HYPERCHOLESTEROLEMIA: ICD-10-CM

## 2022-04-05 DIAGNOSIS — Z94.1 STATUS POST HEART TRANSPLANT: ICD-10-CM

## 2022-04-05 DIAGNOSIS — I50.22 CHRONIC SYSTOLIC CONGESTIVE HEART FAILURE: ICD-10-CM

## 2022-04-05 PROCEDURE — 99214 OFFICE O/P EST MOD 30 MIN: CPT | Performed by: INTERNAL MEDICINE

## 2022-04-05 RX ORDER — INSULIN GLARGINE 100 [IU]/ML
INJECTION, SOLUTION SUBCUTANEOUS
COMMUNITY

## 2022-04-05 RX ORDER — MYCOPHENOLIC ACID 360 MG/1
TABLET, DELAYED RELEASE ORAL
COMMUNITY
Start: 2022-03-14

## 2022-04-05 RX ORDER — ASPIRIN 81 MG/1
81 TABLET, COATED ORAL DAILY
COMMUNITY
Start: 2022-03-14

## 2022-04-05 RX ORDER — CHOLECALCIFEROL (VITAMIN D3) 25 MCG
TABLET,CHEWABLE ORAL DAILY
COMMUNITY
Start: 2022-03-14

## 2022-04-05 RX ORDER — MELATONIN
1000 DAILY
COMMUNITY
Start: 2022-03-14

## 2022-04-05 RX ORDER — MYCOPHENOLATE MOFETIL 500 MG/1
TABLET ORAL
COMMUNITY
Start: 2022-01-13

## 2022-04-05 RX ORDER — FERROUS SULFATE 325(65) MG
1 TABLET ORAL DAILY
COMMUNITY
Start: 2022-03-14

## 2022-04-05 RX ORDER — TACROLIMUS 1 MG/1
1 CAPSULE ORAL 2 TIMES DAILY
COMMUNITY
Start: 2022-03-14

## 2022-04-05 RX ORDER — PREDNISONE 1 MG/1
15 TABLET ORAL DAILY
COMMUNITY
Start: 2022-03-14

## 2022-04-05 RX ORDER — PANTOPRAZOLE SODIUM 40 MG/1
40 TABLET, DELAYED RELEASE ORAL DAILY
COMMUNITY
Start: 2022-03-14

## 2022-04-05 NOTE — PROGRESS NOTES
"    Subjective:     Encounter Date:04/05/2022      Patient ID: Yogi Tucker is a 41 y.o. male.    Chief Complaint:  History of Present Illness 41-year-old white male with history of coronary disease status post bypass surgery history of congestive heart ischemic cardiomyopathy status post ICD placement status post recent heart transplant hypertension hyperlipidemia diabetes presents to my office for follow-up.  Patient is currently stable without any signs of chest pain or shortness of breath at rest or exertion.  No comes any PND orthopnea.  No palpitation dizziness syncope or swelling of the feet.  Patient has been taking all her medicines regular.  Patient does not smoke.  He stated exercise regularly follows with the transplant team.    The following portions of the patient's history were reviewed and updated as appropriate: allergies, current medications, past family history, past medical history, past social history, past surgical history and problem list.  Past Medical History:   Diagnosis Date   • Coronary artery disease    • Hyperlipidemia    • Hypertension      Past Surgical History:   Procedure Laterality Date   • CARDIAC CATHETERIZATION N/A 08/06/2021    Procedure: Left Heart Cath and coronary angiogram;  Surgeon: Carl Appiah MD;  Location: [facility] CATH INVASIVE LOCATION;  Service: Cardiovascular;  Laterality: N/A;   • CARDIAC CATHETERIZATION  08/06/2021    Procedure: Saphenous Vein Graft;  Surgeon: Carl Appiah MD;  Location: [facility] CATH INVASIVE LOCATION;  Service: Cardiovascular;;   • CORONARY ARTERY BYPASS GRAFT     • HEART TRANSPLANT  09/18/2021   • LEFT HEART CATH       BP (!) 153/104 (BP Location: Left arm, Patient Position: Sitting, Cuff Size: Adult)   Pulse 92   Ht 182.9 cm (72\")   Wt 89.4 kg (197 lb)   SpO2 99%   BMI 26.72 kg/m²   Family History   Problem Relation Age of Onset   • No Known Problems Mother    • No Known Problems Father        Current Outpatient Medications:   •  " Aspirin Low Dose 81 MG EC tablet, Take 81 mg by mouth Daily., Disp: , Rfl:   •  atorvastatin (LIPITOR) 40 MG tablet, Take 1 tablet by mouth Every Night., Disp: , Rfl:   •  B Complex-C-Folic Acid (Super B-Complex/Vit C/FA) tablet, Take  by mouth Daily., Disp: , Rfl:   •  bumetanide (BUMEX) 1 MG tablet, Take 1 tablet by mouth 2 (Two) Times a Day., Disp: , Rfl:   •  Caltrate 600+D3 600-800 MG-UNIT tablet, Take 1 tablet by mouth 2 (Two) Times a Day., Disp: , Rfl:   •  cholecalciferol (VITAMIN D3) 25 MCG (1000 UT) tablet, Take 1,000 Units by mouth Daily., Disp: , Rfl:   •  FeroSul 325 (65 Fe) MG tablet, Take 1 tablet by mouth Daily., Disp: , Rfl:   •  glucose blood (OneTouch Verio) test strip, 1 each by Other route Daily. Use as instructed, Disp: 100 each, Rfl: 2  •  Insulin Glargine (BASAGLAR KWIKPEN) 100 UNIT/ML injection pen, Inject  under the skin into the appropriate area as directed., Disp: , Rfl:   •  Isopropyl Alcohol (Alcohol Wipes) 70 % misc, Apply 1 each topically Daily., Disp: 100 each, Rfl: 2  •  Lancets (OneTouch Delica Plus Onoyzp05M) misc, 1 each Daily., Disp: 100 each, Rfl: 2  •  mycophenolate (CELLCEPT) 500 MG tablet, TAKE 3 TABLETS BY MOUTH TWICE DAILY 1 HOUR BEFORE OR 2 HOURS AFTER MEALS, Disp: , Rfl:   •  mycophenolate (MYFORTIC) 360 MG tablet delayed-release EC tablet, TAKE 2 TABLETS BY MOUTH TWICE DAILY ON AN EMPTY STOMACH 1 HOUR BEFORE OR 2 HOURS AFTER EATING, Disp: , Rfl:   •  pantoprazole (PROTONIX) 40 MG EC tablet, Take 40 mg by mouth Daily., Disp: , Rfl:   •  predniSONE (DELTASONE) 5 MG tablet, Take 15 mg by mouth Daily., Disp: , Rfl:   •  tacrolimus (PROGRAF) 1 MG capsule, Take 1 mg by mouth 2 (Two) Times a Day. Takes 2 mg in evening, Disp: , Rfl:   •  carvedilol (COREG) 12.5 MG tablet, Take 1 tablet by mouth 2 (Two) Times a Day., Disp: , Rfl:   •  insulin glargine (LANTUS, SEMGLEE) 100 UNIT/ML injection, Inject 10 Units under the skin into the appropriate area as directed Every 12 (Twelve)  Hours., Disp: , Rfl: 12  Allergies   Allergen Reactions   • Shellfish Allergy Unknown (See Comments)   • Nsaids Other (See Comments)     Social History     Socioeconomic History   • Marital status:    Tobacco Use   • Smoking status: Never Smoker   • Smokeless tobacco: Former User     Types: Chew   Vaping Use   • Vaping Use: Never used   Substance and Sexual Activity   • Alcohol use: Not Currently   • Drug use: Never   • Sexual activity: Defer     Review of Systems   Constitutional: Negative for fever and malaise/fatigue.   Cardiovascular: Negative for chest pain, dyspnea on exertion, leg swelling and palpitations.   Respiratory: Negative for cough and shortness of breath.    Skin: Negative for rash.   Gastrointestinal: Negative for abdominal pain, nausea and vomiting.   Neurological: Negative for focal weakness and headaches.   All other systems reviewed and are negative.             Objective:     Constitutional:       Appearance: Well-developed.   Eyes:      General: No scleral icterus.     Conjunctiva/sclera: Conjunctivae normal.   HENT:      Head: Normocephalic and atraumatic.   Neck:      Vascular: No carotid bruit or JVD.   Pulmonary:      Effort: Pulmonary effort is normal.      Breath sounds: Normal breath sounds. No wheezing. No rales.   Cardiovascular:      Normal rate. Regular rhythm.   Pulses:     Intact distal pulses.   Abdominal:      General: Bowel sounds are normal.      Palpations: Abdomen is soft.   Musculoskeletal:      Cervical back: Normal range of motion and neck supple. Skin:     General: Skin is warm and dry.      Findings: No rash.   Neurological:      Mental Status: Alert.       Procedures    Lab Review:         MDM  #1 coronary disease  Patient had severe coronary status post carotid bypass surgery  2.  S/p cardiac transplant  Bradycardia status post recent cardiac transplant and is currently stable on medications and is followed by the transplant team  3.  History of ischemic  cardiomyopathy status post ICD placement and congestive heart failure but history is post heart transplant now  4 hypertension stable blood pressure currently stable on medications carvedilol  5.  Hyperlipidemia  Patient is on statins and the lipids are well within normal meds  6.  Diabetes  Patient is currently on insulin and followed by primary care doctor.    Patient's previous medical records, labs, and EKG were reviewed and discussed with the patient at today's visit.

## 2022-04-14 ENCOUNTER — OFFICE (OUTPATIENT)
Dept: URBAN - METROPOLITAN AREA CLINIC 64 | Facility: CLINIC | Age: 42
End: 2022-04-14

## 2022-04-14 DIAGNOSIS — K52.89 OTHER SPECIFIED NONINFECTIVE GASTROENTERITIS AND COLITIS: ICD-10-CM

## 2022-06-14 ENCOUNTER — OFFICE (OUTPATIENT)
Dept: URBAN - METROPOLITAN AREA CLINIC 64 | Facility: CLINIC | Age: 42
End: 2022-06-14
Payer: COMMERCIAL

## 2022-06-14 VITALS
SYSTOLIC BLOOD PRESSURE: 173 MMHG | HEIGHT: 71 IN | WEIGHT: 219 LBS | HEART RATE: 102 BPM | DIASTOLIC BLOOD PRESSURE: 111 MMHG

## 2022-06-14 DIAGNOSIS — K92.1 MELENA: ICD-10-CM

## 2022-06-14 DIAGNOSIS — R19.7 DIARRHEA, UNSPECIFIED: ICD-10-CM

## 2022-06-14 PROCEDURE — 99214 OFFICE O/P EST MOD 30 MIN: CPT | Performed by: INTERNAL MEDICINE

## 2023-03-31 PROBLEM — Z79.52 LONG TERM CURRENT USE OF SYSTEMIC STEROIDS: Status: ACTIVE | Noted: 2021-10-04

## 2023-03-31 PROBLEM — G47.33 OBSTRUCTIVE SLEEP APNEA SYNDROME: Status: ACTIVE | Noted: 2022-01-04

## 2023-03-31 PROBLEM — K21.9 GASTROESOPHAGEAL REFLUX DISEASE: Status: ACTIVE | Noted: 2022-01-04

## 2023-03-31 PROBLEM — I27.20 PULMONARY HYPERTENSION: Status: ACTIVE | Noted: 2021-10-04

## 2023-03-31 PROBLEM — Z48.21: Status: ACTIVE | Noted: 2021-10-04

## (undated) DEVICE — CATH DIAG IMPULSE PIG .056 6F 110CM

## (undated) DEVICE — RADIFOCUS OBTURATOR: Brand: RADIFOCUS

## (undated) DEVICE — CATH DIAG IMPULSE FL4 6F 100CM

## (undated) DEVICE — PINNACLE INTRODUCER SHEATH: Brand: PINNACLE

## (undated) DEVICE — GW DIAG EMERALD HEPCOAT MOVE JTIP STD .035 3MM 150CM

## (undated) DEVICE — PK TRY HEART CATH 50

## (undated) DEVICE — CATH DIAG IMPULSE FR4 6F 100CM